# Patient Record
Sex: FEMALE | Race: ASIAN | Employment: OTHER | URBAN - METROPOLITAN AREA
[De-identification: names, ages, dates, MRNs, and addresses within clinical notes are randomized per-mention and may not be internally consistent; named-entity substitution may affect disease eponyms.]

---

## 2022-07-06 ENCOUNTER — APPOINTMENT (OUTPATIENT)
Dept: GENERAL RADIOLOGY | Age: 87
End: 2022-07-06
Attending: EMERGENCY MEDICINE
Payer: COMMERCIAL

## 2022-07-06 ENCOUNTER — APPOINTMENT (OUTPATIENT)
Dept: CT IMAGING | Age: 87
End: 2022-07-06
Attending: EMERGENCY MEDICINE
Payer: COMMERCIAL

## 2022-07-06 ENCOUNTER — HOSPITAL ENCOUNTER (EMERGENCY)
Age: 87
Discharge: HOME | End: 2022-07-06
Attending: EMERGENCY MEDICINE
Payer: COMMERCIAL

## 2022-07-06 VITALS
TEMPERATURE: 98.2 F | HEART RATE: 72 BPM | WEIGHT: 165.79 LBS | SYSTOLIC BLOOD PRESSURE: 163 MMHG | OXYGEN SATURATION: 90 % | DIASTOLIC BLOOD PRESSURE: 67 MMHG | RESPIRATION RATE: 16 BRPM

## 2022-07-06 DIAGNOSIS — S00.03XA SCALP HEMATOMA, INITIAL ENCOUNTER: ICD-10-CM

## 2022-07-06 DIAGNOSIS — S01.01XA LACERATION OF SCALP, INITIAL ENCOUNTER: Primary | ICD-10-CM

## 2022-07-06 PROCEDURE — 90471 IMMUNIZATION ADMIN: CPT

## 2022-07-06 PROCEDURE — 74011000250 HC RX REV CODE- 250: Performed by: EMERGENCY MEDICINE

## 2022-07-06 PROCEDURE — 90714 TD VACC NO PRESV 7 YRS+ IM: CPT | Performed by: EMERGENCY MEDICINE

## 2022-07-06 PROCEDURE — 70450 CT HEAD/BRAIN W/O DYE: CPT

## 2022-07-06 PROCEDURE — 72170 X-RAY EXAM OF PELVIS: CPT

## 2022-07-06 PROCEDURE — 74011250636 HC RX REV CODE- 250/636: Performed by: EMERGENCY MEDICINE

## 2022-07-06 PROCEDURE — 75810000293 HC SIMP/SUPERF WND  RPR

## 2022-07-06 PROCEDURE — 99284 EMERGENCY DEPT VISIT MOD MDM: CPT

## 2022-07-06 PROCEDURE — 72125 CT NECK SPINE W/O DYE: CPT

## 2022-07-06 RX ORDER — ACETAMINOPHEN 500 MG
1000 TABLET ORAL
Status: DISCONTINUED | OUTPATIENT
Start: 2022-07-06 | End: 2022-07-06 | Stop reason: HOSPADM

## 2022-07-06 RX ADMIN — TETANUS AND DIPHTHERIA TOXOIDS ADSORBED 0.5 ML: 2; 2 INJECTION INTRAMUSCULAR at 16:07

## 2022-07-06 RX ADMIN — Medication 5 ML: at 16:28

## 2022-07-06 NOTE — DISCHARGE INSTRUCTIONS
Thank you for allowing us to provide you with medical care today. We realize that you have many choices for your emergency care needs. We thank you for choosing [facility]. Please choose us in the future for any continued health care needs. The exam and treatment you received in the Emergency Department were for an emergent problem and are not intended as complete care. It is important that you follow up with a doctor, nurse practitioner, or physician's assistant for ongoing care. If your symptoms worsen or you do not improve as expected and you are unable to reach your usual health care provider, you should return to the Emergency Department. We are available 24 hours a day. Please make an appointment with your health care provider(s) for follow up of your Emergency Department visit. Take this sheet with you when you go to your follow-up visit.

## 2022-07-06 NOTE — ED PROVIDER NOTES
45-year-old female presents with a chief complaint of headache after a fall. Patient went to sit down at lunch and fell backwards. She did hit her head but did not lose consciousness. She is not on any blood thinners. She did notice some bleeding from the back of her head. She denies neck pain, back pain, chest pain or abdominal pain. She was initially complaining of left hip pain according to the daughter           No past medical history on file. No past surgical history on file. No family history on file. Social History     Socioeconomic History    Marital status: Not on file     Spouse name: Not on file    Number of children: Not on file    Years of education: Not on file    Highest education level: Not on file   Occupational History    Not on file   Tobacco Use    Smoking status: Not on file    Smokeless tobacco: Not on file   Substance and Sexual Activity    Alcohol use: Not on file    Drug use: Not on file    Sexual activity: Not on file   Other Topics Concern    Not on file   Social History Narrative    Not on file     Social Determinants of Health     Financial Resource Strain:     Difficulty of Paying Living Expenses: Not on file   Food Insecurity:     Worried About Running Out of Food in the Last Year: Not on file    [place] of Food in the Last Year: Not on file   Transportation Needs:     Lack of Transportation (Medical): Not on file    Lack of Transportation (Non-Medical):  Not on file   Physical Activity:     Days of Exercise per Week: Not on file    Minutes of Exercise per Session: Not on file   Stress:     Feeling of Stress : Not on file   Social Connections:     Frequency of Communication with Friends and Family: Not on file    Frequency of Social Gatherings with Friends and Family: Not on file    Attends [employer] Services: Not on file    Active Member of Clubs or Organizations: Not on file    Attends Club or Organization Meetings: Not on file    Marital Status: Not on file   Intimate Partner Violence:     Fear of Current or Ex-Partner: Not on file    Emotionally Abused: Not on file    Physically Abused: Not on file    Sexually Abused: Not on file   Housing Stability:     Unable to Pay for Housing in the Last Year: Not on file    Number of [place] in the Last Year: Not on file    Unstable Housing in the Last Year: Not on file         ALLERGIES: Patient has no known allergies. Review of Systems   Constitutional: Negative for fever. HENT: Negative for rhinorrhea. Respiratory: Negative for shortness of breath. Cardiovascular: Negative for chest pain. Gastrointestinal: Negative for abdominal pain. Genitourinary: Negative for dysuria. Musculoskeletal: Negative for back pain. Skin: Negative for wound. Neurological: Positive for headaches. Psychiatric/Behavioral: Negative for confusion. Vitals:    07/06/22 1425   BP: (!) 187/75   Pulse: 72   Resp: 20   Temp: 98.2 °F (36.8 °C)   SpO2: 91%   Weight: 75.2 kg (165 lb 12.6 oz)            Physical Exam  Vitals and nursing note reviewed. Constitutional:       General: She is not in acute distress. Appearance: Normal appearance. She is not ill-appearing, toxic-appearing or diaphoretic. HENT:      Head: Normocephalic. Comments: 1 cm posterior scalp laceration. Bleeding controlled. Eyes:      Extraocular Movements: Extraocular movements intact. Cardiovascular:      Rate and Rhythm: Normal rate. Pulses: Normal pulses. Heart sounds: Normal heart sounds. Pulmonary:      Effort: Pulmonary effort is normal. No respiratory distress. Breath sounds: Normal breath sounds. Abdominal:      General: There is no distension. Musculoskeletal:         General: Normal range of motion. Cervical back: Normal range of motion. Skin:     General: Skin is dry. Neurological:      General: No focal deficit present.       Mental Status: She is alert and oriented to person, place, and time. Psychiatric:         Mood and Affect: Mood normal.          MDM  Number of Diagnoses or Management Options  Laceration of scalp, initial encounter  Scalp hematoma, initial encounter  Diagnosis management comments:     Plain film and CT imaging obtained to rule out traumatic injury. Imaging unremarkable. Laceration repaired per procedure note. Discussed my clinical impression(s), any labs and/or radiology results with the patient. I answered any questions and addressed any concerns. Discussed the importance of following up with their primary care physician and/or specialist(s). Discussed signs or symptoms that would warrant return back to the ER for further evaluation. The patient is agreeable with discharge. Wound Repair    Date/Time: 7/6/2022 5:10 PM  Performed by: attendingLocation details: scalp  Wound length:2.5 cm or less    Anesthesia:  Local Anesthetic: LET (lido, epi, tetracaine)  Foreign bodies: no foreign bodies  Debridement: none  Skin closure: staples  Number of sutures: 3  Technique: simple  Approximation: close  Patient tolerance: patient tolerated the procedure well with no immediate complications  My total time at bedside, performing this procedure was 1-15 minutes.

## 2022-07-06 NOTE — ED TRIAGE NOTES
Pt's family reports pt was sitting back into a chair and was not aware the chair was no longer behind her. She fell onto her buttocks and struck the back of her head on a sliding door; denies any loss of consciousness. PT c/o L hip pain, posterior head laceration. Pt presents to eD awake, alert, oriented, ambulatory with assistance. Laceration to back of head noted, bleeding controlled.

## 2022-07-14 ENCOUNTER — HOSPITAL ENCOUNTER (EMERGENCY)
Age: 87
Discharge: HOME | End: 2022-07-14
Attending: STUDENT IN AN ORGANIZED HEALTH CARE EDUCATION/TRAINING PROGRAM
Payer: COMMERCIAL

## 2022-07-14 VITALS
TEMPERATURE: 98.7 F | OXYGEN SATURATION: 94 % | DIASTOLIC BLOOD PRESSURE: 48 MMHG | SYSTOLIC BLOOD PRESSURE: 138 MMHG | HEART RATE: 74 BPM | RESPIRATION RATE: 16 BRPM

## 2022-07-14 DIAGNOSIS — Z48.02 ENCOUNTER FOR STAPLE REMOVAL: Primary | ICD-10-CM

## 2022-07-14 PROCEDURE — 75810000275 HC EMERGENCY DEPT VISIT NO LEVEL OF CARE

## 2022-07-14 NOTE — ED PROVIDER NOTES
Patient is a 22-year-old female presenting to the ED for staple removal.  Patient is a fall and had 3 staples placed in the back of her head. Area is clean dry and intact. Spencer appear ready for removal    The history is provided by the patient and a relative. Staple Removal   This is a new problem. The current episode started more than 2 days ago. The problem occurs constantly. The problem has been resolved. The pain is at a severity of 0/10. The patient is experiencing no pain. Pertinent negatives include no numbness. She has tried nothing for the symptoms. The treatment provided no relief. There has been a history of trauma. Past Medical History:   Diagnosis Date    Hypertension     Seizure [facility])        History reviewed. No pertinent surgical history. History reviewed. No pertinent family history. Social History     Socioeconomic History    Marital status:      Spouse name: Not on file    Number of children: Not on file    Years of education: Not on file    Highest education level: Not on file   Occupational History    Not on file   Tobacco Use    Smoking status: Never Smoker    Smokeless tobacco: Never Used   Substance and Sexual Activity    Alcohol use: Not on file    Drug use: Not on file    Sexual activity: Not on file   Other Topics Concern    Not on file   Social History Narrative    Not on file     Social Determinants of Health     Financial Resource Strain:     Difficulty of Paying Living Expenses: Not on file   Food Insecurity:     Worried About Running Out of Food in the Last Year: Not on file    [place] of Food in the Last Year: Not on file   Transportation Needs:     Lack of Transportation (Medical): Not on file    Lack of Transportation (Non-Medical):  Not on file   Physical Activity:     Days of Exercise per Week: Not on file    Minutes of Exercise per Session: Not on file   Stress:     Feeling of Stress : Not on file   Social Connections:     Frequency of Communication with Friends and Family: Not on file    Frequency of Social Gatherings with Friends and Family: Not on file    Attends Taoist Services: Not on file    Active Member of Clubs or Organizations: Not on file    Attends Club or Organization Meetings: Not on file    Marital Status: Not on file   Intimate Partner Violence:     Fear of Current or Ex-Partner: Not on file    Emotionally Abused: Not on file    Physically Abused: Not on file    Sexually Abused: Not on file   Housing Stability:     Unable to Pay for Housing in the Last Year: Not on file    Number of [place] in the Last Year: Not on file    Unstable Housing in the Last Year: Not on file         ALLERGIES: Patient has no known allergies. Review of Systems   Constitutional: Negative. HENT: Negative. Eyes: Negative. Respiratory: Negative. Cardiovascular: Negative. Gastrointestinal: Negative. Endocrine: Negative. Genitourinary: Negative. Musculoskeletal: Negative. Skin: Positive for wound. Allergic/Immunologic: Negative. Neurological: Negative. Negative for numbness. Hematological: Negative. Psychiatric/Behavioral: Negative. Vitals:    07/14/22 1109   BP: (!) 138/48   Pulse: 74   Resp: 16   Temp: 98.7 °F (37.1 °C)   SpO2: 94%            Physical Exam  Vitals and nursing note reviewed. Constitutional:       General: She is not in acute distress. Appearance: Normal appearance. HENT:      Head: Normocephalic. Comments: 3 staples to the head     Right Ear: External ear normal.      Left Ear: External ear normal.      Nose: Nose normal.   Eyes:      Extraocular Movements: Extraocular movements intact. Conjunctiva/sclera: Conjunctivae normal.   Cardiovascular:      Rate and Rhythm: Normal rate. Pulses:           Radial pulses are 2+ on the right side and 2+ on the left side.    Pulmonary:      Effort: Pulmonary effort is normal.   Abdominal:      General: Abdomen is flat. There is no distension. Tenderness: There is no abdominal tenderness. Musculoskeletal:         General: No deformity or signs of injury. Normal range of motion. Cervical back: Normal range of motion and neck supple. No tenderness. Skin:     General: Skin is warm and dry. Neurological:      General: No focal deficit present. Mental Status: She is alert and oriented to person, place, and time. Psychiatric:         Attention and Perception: Attention normal.         Mood and Affect: Mood normal.         Behavior: Behavior normal.          MDM     MEDICATIONS GIVEN:  Medications - No data to display    Differential diagnosis: Staple removal, laceration    MDM: Patient is a 80 old female presenting the ED for removal of staples from previous laceration to scalp. Area is clean dry and intact. Stable removal as below. Patient stable for discharge home. Wound cleaned  Nursing. Remove dried blood from her hair. Key discharge instructions and summary of care: You presented to the ED for staple removal.  3 staples removed. Wound was cleaned. May wash and care for the area as usual.  Follow-up    Patient is stable for discharge. All available radiology and laboratory results have been reviewed with patient and/or available family. Patient and/or family verbally conveyed their understanding and agreement of the patient's signs, symptoms, diagnosis, treatment and prognosis and additionally agree to follow-up as recommended in the discharge instructions or to return to the Emergency Department should their condition change or worsen prior to their follow-up appointment. All questions have been answered and patient and/or available family express understanding. IMPRESSION:  1. Encounter for staple removal        DISPOSITION: Discharged    Dev Pulido MD        Suture/Staple Removal    Date/Time: 7/14/2022 11:24 AM  Performed by: Griselda Figueroa MD  Authorized by: Nataliia Mendoza MD     Consent:     Consent obtained:  Verbal    Consent given by:  Patient    Risks discussed:  Bleeding and pain    Alternatives discussed:  No treatment  Location:     Location:  Head/neck    Head/neck location:  Scalp  Procedure details:     Wound appearance:  No signs of infection    Number of staples removed:  3  Post-procedure details:     Post-procedure assessment: Wound cleaned. Patient tolerance of procedure:   Tolerated well, no immediate complications

## 2022-07-14 NOTE — DISCHARGE INSTRUCTIONS
You presented to the ED for staple removal.  3 staples removed. Wound was cleaned.   May wash and care for the area as usual.

## 2022-09-23 ENCOUNTER — HOSPITAL ENCOUNTER (INPATIENT)
Age: 87
LOS: 3 days | Discharge: HOSPICE | DRG: 189 | End: 2022-09-26
Attending: EMERGENCY MEDICINE | Admitting: FAMILY MEDICINE
Payer: COMMERCIAL

## 2022-09-23 ENCOUNTER — APPOINTMENT (OUTPATIENT)
Dept: GENERAL RADIOLOGY | Age: 87
DRG: 189 | End: 2022-09-23
Attending: FAMILY MEDICINE
Payer: COMMERCIAL

## 2022-09-23 ENCOUNTER — APPOINTMENT (OUTPATIENT)
Dept: CT IMAGING | Age: 87
DRG: 189 | End: 2022-09-23
Attending: FAMILY MEDICINE
Payer: COMMERCIAL

## 2022-09-23 ENCOUNTER — APPOINTMENT (OUTPATIENT)
Dept: CT IMAGING | Age: 87
DRG: 189 | End: 2022-09-23
Attending: EMERGENCY MEDICINE
Payer: COMMERCIAL

## 2022-09-23 ENCOUNTER — APPOINTMENT (OUTPATIENT)
Dept: GENERAL RADIOLOGY | Age: 87
DRG: 189 | End: 2022-09-23
Attending: EMERGENCY MEDICINE
Payer: COMMERCIAL

## 2022-09-23 DIAGNOSIS — G93.40 ENCEPHALOPATHY: ICD-10-CM

## 2022-09-23 DIAGNOSIS — E87.1 HYPONATREMIA: ICD-10-CM

## 2022-09-23 DIAGNOSIS — J96.01 ACUTE RESPIRATORY FAILURE WITH HYPOXEMIA (HCC): Primary | ICD-10-CM

## 2022-09-23 DIAGNOSIS — U07.1 COVID-19: ICD-10-CM

## 2022-09-23 PROBLEM — J96.90 RESPIRATORY FAILURE (HCC): Status: ACTIVE | Noted: 2022-09-23

## 2022-09-23 LAB
ALBUMIN SERPL-MCNC: 3.5 G/DL (ref 3.5–5)
ALBUMIN/GLOB SERPL: 0.9 {RATIO} (ref 1.1–2.2)
ALP SERPL-CCNC: 82 U/L (ref 45–117)
ALT SERPL-CCNC: 24 U/L (ref 12–78)
ANION GAP SERPL CALC-SCNC: 3 MMOL/L (ref 5–15)
ANION GAP SERPL CALC-SCNC: 9 MMOL/L (ref 5–15)
APPEARANCE UR: CLEAR
ARTERIAL PATENCY WRIST A: ABNORMAL
AST SERPL-CCNC: 25 U/L (ref 15–37)
ATRIAL RATE: 81 BPM
BACTERIA URNS QL MICRO: NEGATIVE /HPF
BASE EXCESS BLD CALC-SCNC: 3.3 MMOL/L
BASE EXCESS BLDV CALC-SCNC: 7.1 MMOL/L
BASOPHILS # BLD: 0 K/UL (ref 0–0.1)
BASOPHILS NFR BLD: 0 % (ref 0–1)
BDY SITE: ABNORMAL
BILIRUB SERPL-MCNC: 0.7 MG/DL (ref 0.2–1)
BILIRUB UR QL: NEGATIVE
BNP SERPL-MCNC: 3813 PG/ML (ref 0–450)
BUN SERPL-MCNC: 7 MG/DL (ref 6–20)
BUN SERPL-MCNC: 8 MG/DL (ref 6–20)
BUN/CREAT SERPL: 11 (ref 12–20)
BUN/CREAT SERPL: 8 (ref 12–20)
CALCIUM SERPL-MCNC: 7.8 MG/DL (ref 8.5–10.1)
CALCIUM SERPL-MCNC: 8.4 MG/DL (ref 8.5–10.1)
CALCULATED P AXIS, ECG09: 66 DEGREES
CALCULATED R AXIS, ECG10: 82 DEGREES
CALCULATED T AXIS, ECG11: 89 DEGREES
CHLORIDE SERPL-SCNC: 84 MMOL/L (ref 97–108)
CHLORIDE SERPL-SCNC: 85 MMOL/L (ref 97–108)
CO2 SERPL-SCNC: 30 MMOL/L (ref 21–32)
CO2 SERPL-SCNC: 33 MMOL/L (ref 21–32)
COLOR UR: ABNORMAL
COVID-19 RAPID TEST, COVR: DETECTED
CREAT SERPL-MCNC: 0.7 MG/DL (ref 0.55–1.02)
CREAT SERPL-MCNC: 0.9 MG/DL (ref 0.55–1.02)
CRP SERPL-MCNC: 1.16 MG/DL
D DIMER PPP FEU-MCNC: 1 MG/L FEU (ref 0–0.65)
DIAGNOSIS, 93000: NORMAL
DIFFERENTIAL METHOD BLD: ABNORMAL
EOSINOPHIL # BLD: 0 K/UL (ref 0–0.4)
EOSINOPHIL NFR BLD: 0 % (ref 0–7)
EPITH CASTS URNS QL MICRO: ABNORMAL /LPF
ERYTHROCYTE [DISTWIDTH] IN BLOOD BY AUTOMATED COUNT: 15.5 % (ref 11.5–14.5)
ERYTHROCYTE [SEDIMENTATION RATE] IN BLOOD: 9 MM/HR (ref 0–30)
GAS FLOW.O2 O2 DELIVERY SYS: ABNORMAL L/MIN
GLOBULIN SER CALC-MCNC: 3.8 G/DL (ref 2–4)
GLUCOSE SERPL-MCNC: 114 MG/DL (ref 65–100)
GLUCOSE SERPL-MCNC: 91 MG/DL (ref 65–100)
GLUCOSE UR STRIP.AUTO-MCNC: NEGATIVE MG/DL
HCO3 BLD-SCNC: 31.7 MMOL/L (ref 22–26)
HCO3 BLDV-SCNC: 35.2 MMOL/L (ref 23–28)
HCT VFR BLD AUTO: 40.2 % (ref 35–47)
HGB BLD-MCNC: 12.8 G/DL (ref 11.5–16)
HGB UR QL STRIP: ABNORMAL
HYALINE CASTS URNS QL MICRO: ABNORMAL /LPF (ref 0–5)
IMM GRANULOCYTES # BLD AUTO: 0 K/UL (ref 0–0.04)
IMM GRANULOCYTES NFR BLD AUTO: 0 % (ref 0–0.5)
KETONES UR QL STRIP.AUTO: NEGATIVE MG/DL
LACTATE SERPL-SCNC: 1.4 MMOL/L (ref 0.4–2)
LEUKOCYTE ESTERASE UR QL STRIP.AUTO: NEGATIVE
LYMPHOCYTES # BLD: 0.5 K/UL (ref 0.8–3.5)
LYMPHOCYTES NFR BLD: 7 % (ref 12–49)
MAGNESIUM SERPL-MCNC: 1.8 MG/DL (ref 1.6–2.4)
MCH RBC QN AUTO: 26.1 PG (ref 26–34)
MCHC RBC AUTO-ENTMCNC: 31.8 G/DL (ref 30–36.5)
MCV RBC AUTO: 81.9 FL (ref 80–99)
MONOCYTES # BLD: 1.1 K/UL (ref 0–1)
MONOCYTES NFR BLD: 15 % (ref 5–13)
NEUTS SEG # BLD: 5.4 K/UL (ref 1.8–8)
NEUTS SEG NFR BLD: 78 % (ref 32–75)
NITRITE UR QL STRIP.AUTO: NEGATIVE
NRBC # BLD: 0 K/UL (ref 0–0.01)
NRBC BLD-RTO: 0 PER 100 WBC
O2/TOTAL GAS SETTING VFR VENT: 2 %
OSMOLALITY SERPL: 258 MOSM/KG H2O
OSMOLALITY UR: 314 MOSM/KG H2O
P-R INTERVAL, ECG05: 150 MS
PCO2 BLD: 64.1 MMHG (ref 35–45)
PCO2 BLDV: 61 MMHG (ref 41–51)
PH BLD: 7.3 [PH] (ref 7.35–7.45)
PH BLDV: 7.37 [PH] (ref 7.32–7.42)
PH UR STRIP: 8.5 [PH] (ref 5–8)
PLATELET # BLD AUTO: 187 K/UL (ref 150–400)
PLATELET COMMENTS,PCOM: ABNORMAL
PMV BLD AUTO: 9.3 FL (ref 8.9–12.9)
PO2 BLD: 73 MMHG (ref 80–100)
PO2 BLDV: 31 MMHG (ref 25–40)
POTASSIUM SERPL-SCNC: 4 MMOL/L (ref 3.5–5.1)
POTASSIUM SERPL-SCNC: 4.6 MMOL/L (ref 3.5–5.1)
POTASSIUM UR-SCNC: 14 MMOL/L
PROCALCITONIN SERPL-MCNC: <0.05 NG/ML
PROCALCITONIN SERPL-MCNC: <0.05 NG/ML
PROT SERPL-MCNC: 7.3 G/DL (ref 6.4–8.2)
PROT UR STRIP-MCNC: 30 MG/DL
Q-T INTERVAL, ECG07: 342 MS
QRS DURATION, ECG06: 64 MS
QTC CALCULATION (BEZET), ECG08: 397 MS
RBC # BLD AUTO: 4.91 M/UL (ref 3.8–5.2)
RBC #/AREA URNS HPF: ABNORMAL /HPF (ref 0–5)
RBC MORPH BLD: ABNORMAL
SAO2 % BLD: 92 % (ref 92–97)
SAO2 % BLDV: 55.7 % (ref 65–88)
SERVICE CMNT-IMP: ABNORMAL
SODIUM SERPL-SCNC: 121 MMOL/L (ref 136–145)
SODIUM SERPL-SCNC: 123 MMOL/L (ref 136–145)
SODIUM UR-SCNC: 112 MMOL/L
SOURCE, COVRS: ABNORMAL
SP GR UR REFRACTOMETRY: 1.02 (ref 1–1.03)
SPECIMEN TYPE: ABNORMAL
SPECIMEN TYPE: ABNORMAL
TROPONIN-HIGH SENSITIVITY: 24 NG/L (ref 0–51)
TROPONIN-HIGH SENSITIVITY: 43 NG/L (ref 0–51)
TSH SERPL DL<=0.05 MIU/L-ACNC: 0.35 UIU/ML (ref 0.36–3.74)
UR CULT HOLD, URHOLD: NORMAL
URATE SERPL-MCNC: 2.8 MG/DL (ref 2.6–6)
UROBILINOGEN UR QL STRIP.AUTO: 0.2 EU/DL (ref 0.2–1)
VENTRICULAR RATE, ECG03: 81 BPM
WBC # BLD AUTO: 7 K/UL (ref 3.6–11)
WBC URNS QL MICRO: ABNORMAL /HPF (ref 0–4)

## 2022-09-23 PROCEDURE — 84300 ASSAY OF URINE SODIUM: CPT

## 2022-09-23 PROCEDURE — 82803 BLOOD GASES ANY COMBINATION: CPT

## 2022-09-23 PROCEDURE — 74011250636 HC RX REV CODE- 250/636: Performed by: EMERGENCY MEDICINE

## 2022-09-23 PROCEDURE — 93005 ELECTROCARDIOGRAM TRACING: CPT

## 2022-09-23 PROCEDURE — 85652 RBC SED RATE AUTOMATED: CPT

## 2022-09-23 PROCEDURE — 84484 ASSAY OF TROPONIN QUANT: CPT

## 2022-09-23 PROCEDURE — 83935 ASSAY OF URINE OSMOLALITY: CPT

## 2022-09-23 PROCEDURE — 85025 COMPLETE CBC W/AUTO DIFF WBC: CPT

## 2022-09-23 PROCEDURE — 71275 CT ANGIOGRAPHY CHEST: CPT

## 2022-09-23 PROCEDURE — 83735 ASSAY OF MAGNESIUM: CPT

## 2022-09-23 PROCEDURE — 83605 ASSAY OF LACTIC ACID: CPT

## 2022-09-23 PROCEDURE — 87635 SARS-COV-2 COVID-19 AMP PRB: CPT

## 2022-09-23 PROCEDURE — 65270000046 HC RM TELEMETRY

## 2022-09-23 PROCEDURE — 84145 PROCALCITONIN (PCT): CPT

## 2022-09-23 PROCEDURE — 87040 BLOOD CULTURE FOR BACTERIA: CPT

## 2022-09-23 PROCEDURE — 5A09457 ASSISTANCE WITH RESPIRATORY VENTILATION, 24-96 CONSECUTIVE HOURS, CONTINUOUS POSITIVE AIRWAY PRESSURE: ICD-10-PCS | Performed by: STUDENT IN AN ORGANIZED HEALTH CARE EDUCATION/TRAINING PROGRAM

## 2022-09-23 PROCEDURE — 74011000636 HC RX REV CODE- 636: Performed by: EMERGENCY MEDICINE

## 2022-09-23 PROCEDURE — 74011250636 HC RX REV CODE- 250/636: Performed by: FAMILY MEDICINE

## 2022-09-23 PROCEDURE — 84443 ASSAY THYROID STIM HORMONE: CPT

## 2022-09-23 PROCEDURE — 85379 FIBRIN DEGRADATION QUANT: CPT

## 2022-09-23 PROCEDURE — 80053 COMPREHEN METABOLIC PANEL: CPT

## 2022-09-23 PROCEDURE — 84550 ASSAY OF BLOOD/URIC ACID: CPT

## 2022-09-23 PROCEDURE — 36415 COLL VENOUS BLD VENIPUNCTURE: CPT

## 2022-09-23 PROCEDURE — 71045 X-RAY EXAM CHEST 1 VIEW: CPT

## 2022-09-23 PROCEDURE — 74011000250 HC RX REV CODE- 250: Performed by: FAMILY MEDICINE

## 2022-09-23 PROCEDURE — 86140 C-REACTIVE PROTEIN: CPT

## 2022-09-23 PROCEDURE — 36600 WITHDRAWAL OF ARTERIAL BLOOD: CPT

## 2022-09-23 PROCEDURE — 84133 ASSAY OF URINE POTASSIUM: CPT

## 2022-09-23 PROCEDURE — 74011250637 HC RX REV CODE- 250/637: Performed by: FAMILY MEDICINE

## 2022-09-23 PROCEDURE — 83880 ASSAY OF NATRIURETIC PEPTIDE: CPT

## 2022-09-23 PROCEDURE — 81001 URINALYSIS AUTO W/SCOPE: CPT

## 2022-09-23 PROCEDURE — 96374 THER/PROPH/DIAG INJ IV PUSH: CPT

## 2022-09-23 PROCEDURE — 99285 EMERGENCY DEPT VISIT HI MDM: CPT

## 2022-09-23 PROCEDURE — 83930 ASSAY OF BLOOD OSMOLALITY: CPT

## 2022-09-23 RX ORDER — DIAZEPAM 10 MG/2ML
2 INJECTION INTRAMUSCULAR AS NEEDED
Status: COMPLETED | OUTPATIENT
Start: 2022-09-23 | End: 2022-09-23

## 2022-09-23 RX ORDER — PANTOPRAZOLE SODIUM 40 MG/1
40 TABLET, DELAYED RELEASE ORAL DAILY
COMMUNITY
Start: 2022-09-27

## 2022-09-23 RX ORDER — SODIUM CHLORIDE 0.9 % (FLUSH) 0.9 %
5-40 SYRINGE (ML) INJECTION AS NEEDED
Status: DISCONTINUED | OUTPATIENT
Start: 2022-09-23 | End: 2022-09-26 | Stop reason: HOSPADM

## 2022-09-23 RX ORDER — SODIUM CHLORIDE 9 MG/ML
75 INJECTION, SOLUTION INTRAVENOUS CONTINUOUS
Status: DISCONTINUED | OUTPATIENT
Start: 2022-09-23 | End: 2022-09-23

## 2022-09-23 RX ORDER — PANTOPRAZOLE SODIUM 40 MG/1
40 TABLET, DELAYED RELEASE ORAL DAILY
Status: DISCONTINUED | OUTPATIENT
Start: 2022-09-24 | End: 2022-09-25

## 2022-09-23 RX ORDER — FLUTICASONE PROPIONATE AND SALMETEROL 250; 50 UG/1; UG/1
1 POWDER RESPIRATORY (INHALATION) EVERY 12 HOURS
COMMUNITY
End: 2022-09-27

## 2022-09-23 RX ORDER — HYDRALAZINE HYDROCHLORIDE 20 MG/ML
10 INJECTION INTRAMUSCULAR; INTRAVENOUS
Status: DISCONTINUED | OUTPATIENT
Start: 2022-09-23 | End: 2022-09-26 | Stop reason: HOSPADM

## 2022-09-23 RX ORDER — MAG HYDROX/ALUMINUM HYD/SIMETH 200-200-20
30 SUSPENSION, ORAL (FINAL DOSE FORM) ORAL
Status: DISCONTINUED | OUTPATIENT
Start: 2022-09-23 | End: 2022-09-26 | Stop reason: HOSPADM

## 2022-09-23 RX ORDER — SODIUM CHLORIDE 0.9 % (FLUSH) 0.9 %
5-40 SYRINGE (ML) INJECTION EVERY 8 HOURS
Status: DISCONTINUED | OUTPATIENT
Start: 2022-09-23 | End: 2022-09-26 | Stop reason: HOSPADM

## 2022-09-23 RX ORDER — SODIUM CHLORIDE 9 MG/ML
50 INJECTION, SOLUTION INTRAVENOUS CONTINUOUS
Status: DISCONTINUED | OUTPATIENT
Start: 2022-09-23 | End: 2022-09-25

## 2022-09-23 RX ORDER — FUROSEMIDE 10 MG/ML
40 INJECTION INTRAMUSCULAR; INTRAVENOUS ONCE
Status: DISCONTINUED | OUTPATIENT
Start: 2022-09-23 | End: 2022-09-23

## 2022-09-23 RX ORDER — LEVETIRACETAM 500 MG/1
500 TABLET ORAL 2 TIMES DAILY
Status: DISCONTINUED | OUTPATIENT
Start: 2022-09-23 | End: 2022-09-24

## 2022-09-23 RX ORDER — ACETAMINOPHEN 325 MG/1
650 TABLET ORAL
Status: DISCONTINUED | OUTPATIENT
Start: 2022-09-23 | End: 2022-09-26 | Stop reason: HOSPADM

## 2022-09-23 RX ORDER — HEPARIN SODIUM 5000 [USP'U]/ML
5000 INJECTION, SOLUTION INTRAVENOUS; SUBCUTANEOUS EVERY 8 HOURS
Status: DISCONTINUED | OUTPATIENT
Start: 2022-09-23 | End: 2022-09-26 | Stop reason: HOSPADM

## 2022-09-23 RX ORDER — LEVETIRACETAM 500 MG/1
500 TABLET ORAL 2 TIMES DAILY
COMMUNITY
Start: 2022-09-27

## 2022-09-23 RX ADMIN — AZITHROMYCIN MONOHYDRATE 500 MG: 500 INJECTION, POWDER, LYOPHILIZED, FOR SOLUTION INTRAVENOUS at 19:04

## 2022-09-23 RX ADMIN — CEFTRIAXONE SODIUM 1 G: 1 INJECTION, POWDER, FOR SOLUTION INTRAMUSCULAR; INTRAVENOUS at 19:04

## 2022-09-23 RX ADMIN — IOPAMIDOL 80 ML: 755 INJECTION, SOLUTION INTRAVENOUS at 15:50

## 2022-09-23 RX ADMIN — SODIUM CHLORIDE 500 ML: 9 INJECTION, SOLUTION INTRAVENOUS at 13:08

## 2022-09-23 RX ADMIN — HEPARIN SODIUM 5000 UNITS: 5000 INJECTION INTRAVENOUS; SUBCUTANEOUS at 19:05

## 2022-09-23 RX ADMIN — DIAZEPAM 2 MG: 5 INJECTION, SOLUTION INTRAMUSCULAR; INTRAVENOUS at 14:37

## 2022-09-23 NOTE — ED NOTES
Difficulty obtaining EKG and NIBP due to continuous movement from patient despite direction from family. Language barrier.

## 2022-09-23 NOTE — ED NOTES
Patient indicates she needs to urinate to family. Unable to get patient up to bedside commode due to high fall risk, weakness, and requiring total assistance of 3 staff members to transfer patient from wheelchair to stretcher upon arrival.    Jl [place] explained to patient and family. Family verbalizes understanding. Patient is confused. Patient tolerated placement of purewick, but is having difficulty understanding the instruction to urinate. Patient continually attempts to get out bed despite redirection from this RN and family.

## 2022-09-23 NOTE — ED NOTES
TRANSFER - OUT REPORT:    Verbal report given to Ruthie Dumont RN(name) on James Garcia  being transferred to [address] [place] [address] Room 649(unit) for routine progression of care       Report consisted of patients Situation, Background, Assessment and   Recommendations(SBAR). Information from the following report(s) ED Summary, Intake/Output, MAR, Recent Results, and Cardiac Rhythm NSR/ Sinu Tachycardia  was reviewed with the receiving nurse. Lines:   Peripheral IV (Active)   Site Assessment Clean, dry, & intact 09/23/22 1158   Phlebitis Assessment 0 09/23/22 1158   Infiltration Assessment 0 09/23/22 1158   Dressing Status Clean, dry, & intact 09/23/22 1158       Peripheral IV 09/23/22 Left (Active)   Site Assessment Clean, dry, & intact 09/23/22 1203   Phlebitis Assessment 0 09/23/22 1203   Infiltration Assessment 0 09/23/22 1203   Dressing Status Clean, dry, & intact 09/23/22 1203   Dressing Type Transparent 09/23/22 1203   Hub Color/Line Status Pink 09/23/22 1203        Opportunity for questions and clarification was provided.       Patient transported with:   Monitor  Oxygen 4lpm O2 via NC

## 2022-09-23 NOTE — H&P
[facility]  Hospitalist Group  History and Physical    Date of Service:  9/23/2022  Primary Care Provider: Heavenly Thompson MD  Source of information: The patient and Chart review    Chief Complaint: Shortness of Breath and Cough      History of Presenting Illness:   Carl Cortez is a 80 y.o. female who presents with shortness of breath. Patient is a poor story and, history was probably obtained from chart review, apparently patient was diagnosed with COVID 4 weeks ago, since last night has had increasing shortness of breath cough and confusion, she has not been eating drinking well, came to the ER and was requested to be admitted to the hospital service, currently confused not much history could be obtained from the patient           REVIEW OF SYSTEMS:  Review of systems not obtained due to patient factors. Altered mental status    Past Medical History:   Diagnosis Date    Hypertension     Seizure ([address]       History reviewed. No pertinent surgical history. Prior to Admission medications    Medication Sig Start Date End Date Taking? Authorizing Provider   levETIRAcetam (Keppra) 500 mg tablet Take 500 mg by mouth two (2) times a day. Yes Provider, Historical   multivit with iron,minerals (MULTIVITAMIN AND MINERALS PO) Take  by mouth. Yes Provider, Historical   pantoprazole (Protonix) 40 mg tablet Take 40 mg by mouth daily. Yes Provider, Historical   fluticasone propion-salmeteroL (ADVAIR/WIXELA) 250-50 mcg/dose diskus inhaler Take 1 Puff by inhalation every twelve (12) hours. Yes Provider, Historical     No Known Allergies   History reviewed. No pertinent family history. Social History:  reports that she has never smoked. She has never used smokeless tobacco.     Family and social history were personally reviewed, all pertinent and relevant details are outlined as above.   Family history cannot be obtained as patient is confused    Objective:   Visit Vitals  BP (!) 180/89   Pulse 82   Temp 99.1 °F (37.3 °C)   Resp (!) 35   Wt 75 kg (165 lb 5.5 oz)   SpO2 100%   Breastfeeding No    O2 Flow Rate (L/min): 2 l/min O2 Device: None (Room air)    PHYSICAL EXAM:   General: Opens eyes to verbal commands, confused  HEENT: PEERL, EOMI, moist mucus membranes  Neck: Supple, no JVD, no meningeal signs  Chest: Scattered coarse rhonchi bilateral bilateral lung bases  CVS: RRR, S1 S2 heard, no murmurs/rubs/gallops  Abd: Soft, non-tender, non-distended, +bowel sounds   Ext: No clubbing, no cyanosis, no edema  Neuro/Psych: Very limited exam, moves all 4 but strength could not be tested as patient not participating in exam  Cap refill: Brisk, less than 3 seconds  Pulses: 2+, symmetric in all extremities  Skin: Warm, dry, without rashes or lesions    Data Review: All diagnostic labs and studies have been reviewed. Abnormal Labs Reviewed   COVID-19 RAPID TEST - Abnormal; Notable for the following components:       Result Value    COVID-19 rapid test Detected (*)     All other components within normal limits   CBC WITH AUTOMATED DIFF - Abnormal; Notable for the following components:    RDW 15.5 (*)     NEUTROPHILS 78 (*)     LYMPHOCYTES 7 (*)     MONOCYTES 15 (*)     ABS. LYMPHOCYTES 0.5 (*)     ABS.  MONOCYTES 1.1 (*)     All other components within normal limits   METABOLIC PANEL, COMPREHENSIVE - Abnormal; Notable for the following components:    Sodium 121 (*)     Chloride 85 (*)     CO2 33 (*)     Anion gap 3 (*)     Glucose 114 (*)     BUN/Creatinine ratio 8 (*)     GFR est non-AA 58 (*)     Calcium 8.4 (*)     A-G Ratio 0.9 (*)     All other components within normal limits   NT-PRO BNP - Abnormal; Notable for the following components:    NT pro-BNP 3,813 (*)     All other components within normal limits   C REACTIVE PROTEIN, QT - Abnormal; Notable for the following components:    C-Reactive protein 1.16 (*)     All other components within normal limits   D DIMER - Abnormal; Notable for the following components:    D-dimer 1.00 (*)     All other components within normal limits   POC VENOUS BLOOD GAS - Abnormal; Notable for the following components:    pCO2, venous (POC) 61.0 (*)     HCO3, venous (POC) 35.2 (*)     sO2, venous (POC) 55.7 (*)     All other components within normal limits       All Micro Results       Procedure Component Value Units Date/Time    CULTURE, BLOOD, PAIRED [[id number]]     Order Status: Sent Specimen: Blood     COVID-19 RAPID TEST [[id number]]  (Abnormal) Collected: 09/23/22 1205    Order Status: Completed Specimen: Nasopharyngeal Updated: 09/23/22 1233     Specimen source Nasopharyngeal        COVID-19 rapid test Detected        Comment: Rapid Abbott ID Now       The specimen is POSITIVE for SARS-CoV-2, the novel coronavirus associated with COVID-19. This test has been authorized by the FDA under an Emergency Use Authorization (EUA) for use by authorized laboratories. Fact sheet for Healthcare Providers: [employer]  Fact sheet for Patients: [employer]       Methodology: Isothermal Nucleic Acid Amplification  CALLED TO AND READ BACK BY   GARY MENENDEZ AT 8858. CRN. IMAGING:   CTA CHEST W OR W WO CONT   Final Result      1. Extensive limitation secondary to motion. No large pulmonary embolus is   identified. 2.  Small left pleural effusion left lower lobe volume loss. 3.  Focal aneurysmal dilatation of the distal thoracic aorta. 4.  Borderline size asymmetric left axillary lymph nodes with possible   asymmetric soft left breast soft tissue in the small 9 mm nodule in the left   breast. Consider correlation with dedicated breast imaging. XR CHEST PORT   Final Result   Trace left pleural effusion with underlying atelectasis.               ECG/ECHO:    Results for orders placed or performed during the hospital encounter of 09/23/22   EKG, 12 LEAD, INITIAL   Result Value Ref Range    Ventricular Rate 81 BPM    Atrial Rate 81 BPM    P-R Interval 150 ms    QRS Duration 64 ms    Q-T Interval 342 ms    QTC Calculation (Bezet) 397 ms    Calculated P Axis 66 degrees    Calculated R Axis 82 degrees    Calculated T Axis 89 degrees    Diagnosis       Normal sinus rhythm  Baseline artifact  ** Poor data quality, interpretation may be adversely affected  No previous ECGs available  Confirmed by Estuardo Fairbanks MD. ([id number]) on 9/23/2022 2:57:38 PM          Assessment:   Given the patient's current clinical presentation, there is a high level of concern for decompensation if discharged from the emergency department. Complex decision making was performed, which includes reviewing the patient's available past medical records, laboratory results, and imaging studies. Acute hypoxic respiratory failure  Recent history of COVID-19  Severe hyponatremia  Acute metabolic encephalopathy  Thoracic aortic aneurysm    Plan:     Patient will be admitted on a telemetry bed, unclear etiology, check Pro-Darvin level, empirical IV antibiotics, likely related to post-COVID etiology, ABG, blood cultures, oxygen support, pulse ox monitoring, DuoNebs, further intervention per hospital course, reassess as needed  Likely hypovolemic, fluid restriction,  goal will be to correct 8 to 12 mmol/L in the next 24 hours, neurovascular checks, close monitoring and further intervention per hospital course  Likely secondary above, check UA, IV hydration, CT of the head does not show any acute pathology, neurovascular checks, if symptoms persist may consider further intervention and diagnostics ABG and reassess as needed  Close monitoring, may consider getting vascular surgery consult, monitor    DIET: ADULT DIET Regular; 4 carb choices (60 gm/meal); Low Fat/Low Chol/High Fiber/2 gm Na;  Low Sodium (2 gm)   ISOLATION PRECAUTIONS: Droplet Plus  CODE STATUS: Full Code   DVT PROPHYLAXIS: Heparin  FUNCTIONAL STATUS PRIOR TO HOSPITALIZATION: Ambulatory and capable of self-care but relies on assistive devices (rolling walker/cane). Ambulatory status/function: Ambulates with assistance:  Cane   EARLY MOBILITY ASSESSMENT: Recommend a consult to the Mobility Team  ANTICIPATED DISCHARGE: Greater than 48 hours. ANTICIPATED DISPOSITION: Home with Home Healthcare    CRITICAL CARE WAS PERFORMED FOR THIS ENCOUNTER: YES. I had a face to face encounter with the patient, reviewed and interpreted patient data including clinical events, labs, images, vital signs, I/O's, and examined patient. I have discussed the case and the plan and management of the patient's care with the consulting services, the bedside nurses and necessary ancillary providers. This patient has a high probability of imminent, clinically significant deterioration, which requires the highest level of preparedness to intervene urgently. I participated in the decision-making and personally managed or directed the management of the following life and organ supporting interventions that required my frequent assessment to treat or prevent imminent deterioration. I personally spent 48 minutes of critical care time. This is time spent at this critically ill patient's bedside actively involved in patient care as well as the coordination of care and discussions with the patient's family. This does not include any procedural time which has been billed separately. Signed By: Kim Stevenson MD     September 23, 2022         Please note that this dictation may have been completed with Dragon, the computer voice recognition software. Quite often unanticipated grammatical, syntax, homophones, and other interpretive errors are inadvertently transcribed by the computer software. Please disregard these errors. Please excuse any errors that have escaped final proofreading.

## 2022-09-23 NOTE — ED NOTES
Patient is less anxious CT is going to be re attempted. Patient to CT via stretcher. Family accompanying patient. [facility] also at bedside. Report given to ALS provider.

## 2022-09-23 NOTE — ED PROVIDER NOTES
The history is provided by the patient. Shortness of Breath  This is a recurrent problem. The average episode lasts 2 weeks. The problem occurs continuously. The current episode started more than 1 week ago. The problem has been rapidly worsening (in the last day). Associated symptoms include cough and leg swelling. Pertinent negatives include no fever, no hemoptysis and no chest pain. Precipitated by: recent covid infection. She has tried nothing for the symptoms. She has had Prior hospitalizations. Cough  This is a recurrent problem. The current episode started more than 1 week ago. The problem occurs constantly. The problem has been gradually worsening. The cough is Non-productive. There has been no fever. Associated symptoms include shortness of breath. Pertinent negatives include no chest pain. She has tried nothing for the symptoms. Past Medical History:   Diagnosis Date    Hypertension     Seizure [facility])        History reviewed. No pertinent surgical history. History reviewed. No pertinent family history. Social History     Socioeconomic History    Marital status:      Spouse name: Not on file    Number of children: Not on file    Years of education: Not on file    Highest education level: Not on file   Occupational History    Not on file   Tobacco Use    Smoking status: Never    Smokeless tobacco: Never   Substance and Sexual Activity    Alcohol use: Not on file    Drug use: Not on file    Sexual activity: Not on file   Other Topics Concern    Not on file   Social History Narrative    Not on file     Social Determinants of Health     Financial Resource Strain: Not on file   Food Insecurity: Not on file   Transportation Needs: Not on file   Physical Activity: Not on file   Stress: Not on file   Social Connections: Not on file   Intimate Partner Violence: Not on file   Housing Stability: Not on file         ALLERGIES: Patient has no known allergies.     Review of Systems   Constitutional: Negative for fever. Respiratory:  Positive for cough and shortness of breath. Negative for hemoptysis. Cardiovascular:  Positive for leg swelling. Negative for chest pain. All other systems reviewed and are negative. Vitals:    09/23/22 1245 09/23/22 1302 09/23/22 1315 09/23/22 1330   BP:  (!) 192/101     Pulse: 83 91 87 (!) 102   Resp: 29 16 28 (!) 34   Temp:       SpO2: 95% 96% 97% 94%   Weight:                Physical Exam  Vitals and nursing note reviewed. Constitutional:       General: She is in acute distress. Appearance: She is well-developed. She is ill-appearing. HENT:      Head: Normocephalic and atraumatic. Eyes:      Conjunctiva/sclera: Conjunctivae normal.   Cardiovascular:      Rate and Rhythm: Normal rate and regular rhythm. Heart sounds: Normal heart sounds. Pulmonary:      Effort: Tachypnea and accessory muscle usage present. No respiratory distress. Breath sounds: Examination of the right-middle field reveals rales. Examination of the left-middle field reveals rales. Examination of the right-lower field reveals rales. Examination of the left-lower field reveals rales. Rales present. Abdominal:      General: There is no distension. Musculoskeletal:         General: No deformity. Normal range of motion. Cervical back: Neck supple. Skin:     General: Skin is warm and dry. Neurological:      Mental Status: She is alert. Cranial Nerves: No cranial nerve deficit. Psychiatric:         Behavior: Behavior normal.        MDM       75-year-old female presents with worsening respiratory failure overnight after having a cough and shortness of breath progressively for the last 2 weeks. She was first diagnosed with COVID 4 weeks ago but still rapid antigen positive on screening here. Her mental status is declined and her oral intake has also progressively diminished. She is on 4 L of oxygen by nasal cannula.   Pending CTA chest to rule out PE but she is not cooperative with positioning and sitting still for the test. Gentle fluids administered for slow safe correction of sodium. Procedures    EKG 1155: Rate 81, normal sinus rhythm, nonspecific ST and T wave abnormality, interpretation limited by artifact. Critical Care Time: 32 minutes  I personally performed critical care time separate of billable procedures which may include ordering and interpretation of testing, ordering of medications, direct patient assessment and reassessment, discussion with consultants or family, and documentation. Perfect Serve Consult for Admission  2:25 PM    ED Room Number: SER07/07  Patient Name and age:  Leora Rangel 80 y.o.  female  Working Diagnosis:   1. Acute respiratory failure with hypoxemia (HCC)    2. COVID-19    3. Hyponatremia    4.  Encephalopathy        COVID-19 Suspicion:  yes  Sepsis present:  no  Reassessment needed: no  Code Status:  Do Not Resuscitate  Readmission: no  Isolation Requirements:  yes  Recommended Level of Care:  step down  Department:[place] [facility] - [phone]

## 2022-09-23 NOTE — ED NOTES
Patient returned from [address] with daughter and rad tech. Patient unable to have CT due to movement. MD has already been made aware. Valium order placed. Vitals obtained.

## 2022-09-23 NOTE — ED NOTES
Patient is not able to tolerate NIBP on arm despite encouragement from family. NIBP moved to RLE - patient continues to move while BP is taking.

## 2022-09-23 NOTE — PROGRESS NOTES
Pt respiratory rate 35, room air O2 82%, crackles in lungs, and BNP > 3,800. Despite letting MD know this, MD has ordered fluids at 75 ml/hr.

## 2022-09-24 ENCOUNTER — APPOINTMENT (OUTPATIENT)
Dept: CT IMAGING | Age: 87
DRG: 189 | End: 2022-09-24
Attending: FAMILY MEDICINE
Payer: COMMERCIAL

## 2022-09-24 LAB
ALBUMIN SERPL-MCNC: 2.9 G/DL (ref 3.5–5)
ALBUMIN/GLOB SERPL: 0.9 {RATIO} (ref 1.1–2.2)
ALP SERPL-CCNC: 73 U/L (ref 45–117)
ALT SERPL-CCNC: 23 U/L (ref 12–78)
ANION GAP SERPL CALC-SCNC: 3 MMOL/L (ref 5–15)
ANION GAP SERPL CALC-SCNC: 5 MMOL/L (ref 5–15)
ANION GAP SERPL CALC-SCNC: 7 MMOL/L (ref 5–15)
AST SERPL-CCNC: 26 U/L (ref 15–37)
BASOPHILS # BLD: 0 K/UL (ref 0–0.1)
BASOPHILS NFR BLD: 0 % (ref 0–1)
BILIRUB SERPL-MCNC: 0.5 MG/DL (ref 0.2–1)
BUN SERPL-MCNC: 13 MG/DL (ref 6–20)
BUN SERPL-MCNC: 18 MG/DL (ref 6–20)
BUN SERPL-MCNC: 9 MG/DL (ref 6–20)
BUN/CREAT SERPL: 12 (ref 12–20)
BUN/CREAT SERPL: 16 (ref 12–20)
BUN/CREAT SERPL: 18 (ref 12–20)
CALCIUM SERPL-MCNC: 8 MG/DL (ref 8.5–10.1)
CALCIUM SERPL-MCNC: 8.3 MG/DL (ref 8.5–10.1)
CALCIUM SERPL-MCNC: 8.4 MG/DL (ref 8.5–10.1)
CHLORIDE SERPL-SCNC: 86 MMOL/L (ref 97–108)
CHLORIDE SERPL-SCNC: 87 MMOL/L (ref 97–108)
CHLORIDE SERPL-SCNC: 88 MMOL/L (ref 97–108)
CO2 SERPL-SCNC: 30 MMOL/L (ref 21–32)
CO2 SERPL-SCNC: 32 MMOL/L (ref 21–32)
CO2 SERPL-SCNC: 34 MMOL/L (ref 21–32)
CORTIS AM PEAK SERPL-MCNC: 18.2 UG/DL (ref 4.3–22.45)
CREAT SERPL-MCNC: 0.75 MG/DL (ref 0.55–1.02)
CREAT SERPL-MCNC: 0.8 MG/DL (ref 0.55–1.02)
CREAT SERPL-MCNC: 0.98 MG/DL (ref 0.55–1.02)
DIFFERENTIAL METHOD BLD: ABNORMAL
EOSINOPHIL # BLD: 0 K/UL (ref 0–0.4)
EOSINOPHIL NFR BLD: 0 % (ref 0–7)
ERYTHROCYTE [DISTWIDTH] IN BLOOD BY AUTOMATED COUNT: 15.3 % (ref 11.5–14.5)
GLOBULIN SER CALC-MCNC: 3.1 G/DL (ref 2–4)
GLUCOSE SERPL-MCNC: 109 MG/DL (ref 65–100)
GLUCOSE SERPL-MCNC: 68 MG/DL (ref 65–100)
GLUCOSE SERPL-MCNC: 78 MG/DL (ref 65–100)
HCT VFR BLD AUTO: 38.8 % (ref 35–47)
HGB BLD-MCNC: 12.4 G/DL (ref 11.5–16)
IMM GRANULOCYTES # BLD AUTO: 0 K/UL (ref 0–0.04)
IMM GRANULOCYTES NFR BLD AUTO: 0 % (ref 0–0.5)
LYMPHOCYTES # BLD: 0.8 K/UL (ref 0.8–3.5)
LYMPHOCYTES NFR BLD: 16 % (ref 12–49)
MCH RBC QN AUTO: 26.6 PG (ref 26–34)
MCHC RBC AUTO-ENTMCNC: 32 G/DL (ref 30–36.5)
MCV RBC AUTO: 83.3 FL (ref 80–99)
MONOCYTES # BLD: 0.7 K/UL (ref 0–1)
MONOCYTES NFR BLD: 15 % (ref 5–13)
NEUTS SEG # BLD: 3.2 K/UL (ref 1.8–8)
NEUTS SEG NFR BLD: 69 % (ref 32–75)
NRBC # BLD: 0 K/UL (ref 0–0.01)
NRBC BLD-RTO: 0 PER 100 WBC
PLATELET # BLD AUTO: 157 K/UL (ref 150–400)
PMV BLD AUTO: 8.7 FL (ref 8.9–12.9)
POTASSIUM SERPL-SCNC: 3.9 MMOL/L (ref 3.5–5.1)
POTASSIUM SERPL-SCNC: 4.1 MMOL/L (ref 3.5–5.1)
POTASSIUM SERPL-SCNC: 4.1 MMOL/L (ref 3.5–5.1)
PROT SERPL-MCNC: 6 G/DL (ref 6.4–8.2)
RBC # BLD AUTO: 4.66 M/UL (ref 3.8–5.2)
RBC MORPH BLD: ABNORMAL
SODIUM SERPL-SCNC: 123 MMOL/L (ref 136–145)
SODIUM SERPL-SCNC: 124 MMOL/L (ref 136–145)
SODIUM SERPL-SCNC: 125 MMOL/L (ref 136–145)
TROPONIN-HIGH SENSITIVITY: 42 NG/L (ref 0–51)
WBC # BLD AUTO: 4.7 K/UL (ref 3.6–11)

## 2022-09-24 PROCEDURE — 74011250636 HC RX REV CODE- 250/636: Performed by: FAMILY MEDICINE

## 2022-09-24 PROCEDURE — 85025 COMPLETE CBC W/AUTO DIFF WBC: CPT

## 2022-09-24 PROCEDURE — 65270000046 HC RM TELEMETRY

## 2022-09-24 PROCEDURE — 74011250636 HC RX REV CODE- 250/636: Performed by: STUDENT IN AN ORGANIZED HEALTH CARE EDUCATION/TRAINING PROGRAM

## 2022-09-24 PROCEDURE — 97530 THERAPEUTIC ACTIVITIES: CPT

## 2022-09-24 PROCEDURE — 80048 BASIC METABOLIC PNL TOTAL CA: CPT

## 2022-09-24 PROCEDURE — 74011250637 HC RX REV CODE- 250/637: Performed by: STUDENT IN AN ORGANIZED HEALTH CARE EDUCATION/TRAINING PROGRAM

## 2022-09-24 PROCEDURE — 74176 CT ABD & PELVIS W/O CONTRAST: CPT

## 2022-09-24 PROCEDURE — 36415 COLL VENOUS BLD VENIPUNCTURE: CPT

## 2022-09-24 PROCEDURE — 92610 EVALUATE SWALLOWING FUNCTION: CPT

## 2022-09-24 PROCEDURE — 74011000250 HC RX REV CODE- 250: Performed by: FAMILY MEDICINE

## 2022-09-24 PROCEDURE — 77010033678 HC OXYGEN DAILY

## 2022-09-24 PROCEDURE — 97161 PT EVAL LOW COMPLEX 20 MIN: CPT

## 2022-09-24 PROCEDURE — 82533 TOTAL CORTISOL: CPT

## 2022-09-24 PROCEDURE — 80053 COMPREHEN METABOLIC PANEL: CPT

## 2022-09-24 PROCEDURE — 84484 ASSAY OF TROPONIN QUANT: CPT

## 2022-09-24 PROCEDURE — 94760 N-INVAS EAR/PLS OXIMETRY 1: CPT

## 2022-09-24 RX ORDER — LEVETIRACETAM 500 MG/1
500 TABLET ORAL 2 TIMES DAILY
Status: DISCONTINUED | OUTPATIENT
Start: 2022-09-24 | End: 2022-09-25 | Stop reason: SDUPTHER

## 2022-09-24 RX ORDER — GUAIFENESIN 600 MG/1
600 TABLET, EXTENDED RELEASE ORAL EVERY 12 HOURS
Status: DISCONTINUED | OUTPATIENT
Start: 2022-09-24 | End: 2022-09-26 | Stop reason: HOSPADM

## 2022-09-24 RX ORDER — BENZONATATE 100 MG/1
100 CAPSULE ORAL
Status: DISCONTINUED | OUTPATIENT
Start: 2022-09-24 | End: 2022-09-26 | Stop reason: HOSPADM

## 2022-09-24 RX ORDER — LEVETIRACETAM 500 MG/5ML
500 INJECTION, SOLUTION, CONCENTRATE INTRAVENOUS EVERY 12 HOURS
Status: DISCONTINUED | OUTPATIENT
Start: 2022-09-24 | End: 2022-09-24

## 2022-09-24 RX ORDER — GUAIFENESIN/DEXTROMETHORPHAN 100-10MG/5
10 SYRUP ORAL
Status: DISCONTINUED | OUTPATIENT
Start: 2022-09-24 | End: 2022-09-26 | Stop reason: HOSPADM

## 2022-09-24 RX ORDER — ALBUTEROL SULFATE 90 UG/1
2 AEROSOL, METERED RESPIRATORY (INHALATION)
Status: DISCONTINUED | OUTPATIENT
Start: 2022-09-24 | End: 2022-09-24

## 2022-09-24 RX ORDER — ALBUTEROL SULFATE 90 UG/1
2 AEROSOL, METERED RESPIRATORY (INHALATION)
Status: DISCONTINUED | OUTPATIENT
Start: 2022-09-24 | End: 2022-09-26 | Stop reason: HOSPADM

## 2022-09-24 RX ORDER — PANTOPRAZOLE SODIUM 40 MG/1
40 TABLET, DELAYED RELEASE ORAL
Status: DISCONTINUED | OUTPATIENT
Start: 2022-09-24 | End: 2022-09-26 | Stop reason: HOSPADM

## 2022-09-24 RX ADMIN — AZITHROMYCIN MONOHYDRATE 500 MG: 500 INJECTION, POWDER, LYOPHILIZED, FOR SOLUTION INTRAVENOUS at 18:54

## 2022-09-24 RX ADMIN — LEVETIRACETAM 500 MG: 100 INJECTION INTRAVENOUS at 13:33

## 2022-09-24 RX ADMIN — PANTOPRAZOLE SODIUM 40 MG: 40 TABLET, DELAYED RELEASE ORAL at 18:50

## 2022-09-24 RX ADMIN — SODIUM CHLORIDE, PRESERVATIVE FREE 10 ML: 5 INJECTION INTRAVENOUS at 13:34

## 2022-09-24 RX ADMIN — GUAIFENESIN 600 MG: 600 TABLET, EXTENDED RELEASE ORAL at 21:06

## 2022-09-24 RX ADMIN — LEVETIRACETAM 500 MG: 500 TABLET, FILM COATED ORAL at 18:50

## 2022-09-24 RX ADMIN — CEFTRIAXONE SODIUM 1 G: 1 INJECTION, POWDER, FOR SOLUTION INTRAMUSCULAR; INTRAVENOUS at 18:54

## 2022-09-24 RX ADMIN — HEPARIN SODIUM 5000 UNITS: 5000 INJECTION INTRAVENOUS; SUBCUTANEOUS at 02:32

## 2022-09-24 RX ADMIN — HEPARIN SODIUM 5000 UNITS: 5000 INJECTION INTRAVENOUS; SUBCUTANEOUS at 18:51

## 2022-09-24 RX ADMIN — SODIUM CHLORIDE 10 ML: 9 INJECTION, SOLUTION INTRAMUSCULAR; INTRAVENOUS; SUBCUTANEOUS at 21:03

## 2022-09-24 NOTE — PROGRESS NOTES
SLP Contact Note    Update: Discussed with Dr. Krissy Milligan and order placed under verbal with readback. Will see today. Consult received as a per protocol order from RN. Therapy is not a per protocol service and therefore cannot see per protocol orders. Upon looking into patient's chart, no swallow screen has been done. No signs of aspiration on chest CT. Furthermore, pt is of advanced age (80). Given that aspiration/penetration is considered a normal swallow variance above the age of 80 (presbyphagia) per research, would not be inclined to modify a patient with advanced age's diet unless there are signs of clinical intolerance (signs of infection, worsening chest imaging etc) or if diet texture needs to be adjusted for dentition/cognition. Therefore, would recommend completing swallow screen when pt is appropriate. If patient with overt difficulty, can certainly place SLP consult, but please ensure it is either telephone or verbal with readback from attending.        Thank you,  Fredrick Gomez M.Ed, Shamar Olivas  Speech-Language Pathologist

## 2022-09-24 NOTE — PROGRESS NOTES
Problem: Dysphagia (Adult)  Goal: *Acute Goals and Plan of Care (Insert Text)  Description: Speech Therapy Goals  Initiated 9/24/22    1. Patient will tolerate baseline diet without adverse effects within 7 days. Outcome: Progressing Towards Goal       SPEECH LANGUAGE PATHOLOGY BEDSIDE SWALLOW EVALUATION  Patient: Daiana Andres (87 y.o. female)  Date: 9/24/2022  Primary Diagnosis: Respiratory failure ([address] [J96.90]       Precautions:        ASSESSMENT :  SLP evaluation complete. Pt does have a delayed cough intermittent after belching consistent with reflux. Pt difficulty with medications could also be related to presbyesophagus. Reflux could also be a reason for pt's chronic bronchitis. Therefore, general esophageal/reflux precautions indicated. Recommend regular diet/thin liquids. Would not thicken liquids. There are no signs of clinical intolerance of PO (no changes in WBC or signs of aspiration on chest imaging). Given that aspiration/penetration is considered a normal swallow variance above the age of 80 (presbyphagia) per research, would not be inclined to modify a patient with advanced age's diet unless there are signs of clinical intolerance (signs of infection, worsening chest imaging etc) or if diet texture needs to be adjusted for dentition/cognition. Therefore, would recommend monitoring pt's overall clinical tolerance and not necessarily taking pt's bedside presentation as indicative of diet tolerance. Patient will benefit from skilled intervention to address the above impairments. Patients rehabilitation potential is considered to be Guarded     PLAN :  Recommendations and Planned Interventions:  --regular diet/thin liquids  --patient prefers cup sips not straw.   --consider medical management of reflux  --meds in applesauce crushed when possible  --good oral care  --do not thicken liquids without discussing with SLP first    Frequency/Duration: Patient will be followed by speech-language pathology 3 times a week to address goals. Discharge Recommendations: None     SUBJECTIVE:   Patient stated, Abelino Medrano. Pt pleasant and cooperative. Family at bedside. Significant education done with family re: presbyphagia and presbyesophagus. OBJECTIVE:     Past Medical History:   Diagnosis Date    Hypertension     Seizure ([address]    History reviewed. No pertinent surgical history. Prior Level of Function/Home Situation:   Home Situation  Home Environment: Private residence  # Steps to Enter: 4  One/Two Story Residence: One story  Living Alone: No  Support Systems: Child(johnathan), Other Family Member(s)  Patient Expects to be Discharged to[de-identified] Home  Diet prior to admission: regular diet/thin liquids  Current Diet:  NPO   Cognitive and Communication Status:  Neurologic State: Alert, Confused  Orientation Level: Oriented to person  Cognition: Follows commands, Poor safety awareness           Oral Assessment:  Oral Assessment  Labial: No impairment  Oral Hygiene: oral mucosa slightly dry  Lingual: No impairment  Velum: No impairment  Mandible: No impairment  P.O. Trials:  Patient Position: upright in bed  Vocal quality prior to P.O.: No impairment  Consistency Presented: Thin liquid; Solid  How Presented: Self-fed/presented;Cup/sip;Straw;Successive swallows     Bolus Acceptance: No impairment  Bolus Formation/Control: No impairment     Propulsion: No impairment  Oral Residue: None  Initiation of Swallow: No impairment  Laryngeal Elevation: Functional  Aspiration Signs/Symptoms:  (cough; seems to be associated with belching)  Pharyngeal Phase Characteristics: No impairment, issues, or problems              Oral Phase Severity: No impairment  Pharyngeal Phase Severity :  (likely presbyphagia and presbyesophagus)    NOMS:   The NOMS functional outcome measure was used to quantify this patient's level of swallowing impairment.   Based on the NOMS, the patient was determined to be at level 6 for swallow function       NOMS Swallowing Levels:  Level 1 (CN): NPO  Level 2 (CM): NPO but takes consistency in therapy  Level 3 (CL): Takes less than 50% of nutrition p.o. and continues with nonoral feedings; and/or safe with mod cues; and/or max diet restriction  Level 4 (CK): Safe swallow but needs mod cues; and/or mod diet restriction; and/or still requires some nonoral feeding/supplements  Level 5 (CJ): Safe swallow with min diet restriction; and/or needs min cues  Level 6 (CI): Independent with p.o.; rare cues; usually self cues; may need to avoid some foods or needs extra time  Level 7 ([address] [place] [address]): Independent for all p.o.  JENN. (2003). National Outcomes Measurement System (NOMS): Adult Speech-Language Pathology User's Guide. Pain:  Pain Scale 1: Numeric (0 - 10)  Pain Intensity 1: 0       After treatment:   Call bell within reach and Nursing notified    COMMUNICATION/EDUCATION:     The patient's plan of care including recommendations, planned interventions, and recommended diet changes were discussed with: Registered nurse, MD.     Patient/family have participated as able in goal setting and plan of care.     Thank you for this referral.  Josefa Longo, SLP  Time Calculation: 15 mins

## 2022-09-24 NOTE — CONSULTS
PULMONARY MEDICINE    Initial Physician Consultation Note    Name: Betsy Wood   : 1928   MRN: [id number]   Date: 2022      Subjective:   Consult Note: 2022   Requesting Physician: jose  Reason for consult: Dr. Radha Craig records and data reviewed. Patient is a 80 y.o. female who presented to the hospital with shortness of breath. Patient initially tested positive for COVID-19 4 weeks ago when everyone in the family had infection when school started. She had mild symptoms then. Since then she has had failure to thrive with poor oral intake, shortness of breath and limited endurance. Her family also reports cough productive of clear expectoration. 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 She was also noted to be hypoxic requiring 2 L of nasal cannula oxygen. She underwent CT angiogram of the chest that did not show pulmonary emboli, but did show findings of small left-sided pleural effusion with basilar atelectatic changes as well as cardiomegaly. CT scan was limited by motion artifact. She has undergone CT of the abdomen and lower lung cuts will be reviewed. ABG had shown presence of partially compensated respiratory acidosis. She has never smoked, is visiting the [address] [place] [address],[address] from [facility], has not had COVID-19 infection prior to this current episode. She has had a history of recurrent bronchitis and has a prescription for Serevent that she is using for shortness of breath. She was to see Dr. Stacy Banrey in the office next week    Review of Systems:     A comprehensive 12 system review of systems was mated from the patient.   Family contributed to history    Assessment:   Acute hypoxic hypercarbic pulmonary insufficiency  COVID-19 infection, initial positive test was 4 weeks ago, she tested positive with a rapid test on presentation to the hospital, possibilities include delayed clearance of initial viral infection or reinfection, CRP is 1.16 and procalcitonin is normal  Abnormal CT scan of the chest with small left effusion and findings of left basilar linear atelectasis and patchy airspace disease, evaluation of pulmonary parenchyma limited by motion artifact  Severe hyponatremia with dehydration and SIADH  Previous history of unspecified pulmonary problems, treated with empiric bronchodilators  History of seizure disorder  DNR status      Recommendations: On oxygen, wean as tolerated  On empiric antibiotics  On heparin  Bronchodilators as needed with spacer device  Symptomatic management for cough  Repeat ABG to reassess respiratory acidosis  No acute therapies for COVID-19 are currently indicated  Monitor oxygen requirement and clinical course  Nephrology is following  Follow-up chest imaging is recommended in 8 to 12 weeks to reassess pulmonary parenchyma  Consider echocardiogram  Family has been asked to reschedule appointment in the pulmonary clinic to later date  Discussed extensively with patient's family  Discussed with hospitalist  Available to address acute pulmonary issues through the weekend and will otherwise check back on Monday    Active Problem List:     Problem List  Never Reviewed            Codes Class    Respiratory failure ([facility] [address] ICD-10-CM: J96.90  ICD-9-CM: 518.81          Past Medical History:      has a past medical history of Hypertension and Seizure ([facility] [address] Past Surgical History:      has no past surgical history on file. Home Medications:     Prior to Admission medications    Medication Sig Start Date End Date Taking? Authorizing Provider   levETIRAcetam (Keppra) 500 mg tablet Take 500 mg by mouth two (2) times a day. Yes Provider, Historical   multivit with iron,minerals (MULTIVITAMIN AND MINERALS PO) Take  by mouth. Yes Provider, Historical   pantoprazole (Protonix) 40 mg tablet Take 40 mg by mouth daily. Yes Provider, Historical   fluticasone propion-salmeteroL (ADVAIR/WIXELA) 250-50 mcg/dose diskus inhaler Take 1 Puff by inhalation every twelve (12) hours. Yes Provider, Historical       Allergies/Social/Family History:     No Known Allergies   Social History     Tobacco Use    Smoking status: Never    Smokeless tobacco: Never   Substance Use Topics    Alcohol use: Not on file      History reviewed. No pertinent family history. Objective:   Vital Signs:  Visit Vitals  BP (!) 126/52 (BP 1 Location: Right lower arm, BP Patient Position: At rest)   Pulse 66   Temp 98.1 °F (36.7 °C)   Resp 19   Wt 75 kg (165 lb 5.5 oz)   SpO2 100%   Breastfeeding No    O2 Flow Rate (L/min): 2 l/min O2 Device: Nasal cannula Temp (24hrs), Av.6 °F (37 °C), Min:98.1 °F (36.7 °C), Max:99.1 °F (37.3 °C)           Intake/Output:     Intake/Output Summary (Last 24 hours) at 2022 1156  Last data filed at 2022 0436  Gross per 24 hour   Intake 500 ml   Output 400 ml   Net 100 ml         Pertinent physical exam performed with PPE and COVID 19 distancing precautions as indicated  Physical exam findings of attending physician rounding on patient today reviewed as documented    Physical Exam:   General:  Sleepy, no distress, appears stated age. Tired appearing   Head:  Normocephalic   Lungs:   Symmetrical expansion   Chest wall:  No deformity. Heart:  Regular rate and rhythm   Abdomen:   Non-distended   Extremities: Atraumatic, no cyanosis or edema.    Skin: No rashes or lesions   Neurologic: Grossly nonfocal        LABS AND  DATA: Personally reviewed  Recent Labs     22  0219 22  1205   WBC 4.7 7.0   HGB 12.4 12.8   HCT 38.8 40.2    187       Recent Labs     22  0943 22  0219 22  1205 22  1204   * 123*   < >  --    K 4.1 4.1   < >  --    CL 87* 86*   < >  --    CO2 32 30   < >  --    BUN 13 9   < >  --    CREA 0.80 0.75   < >  --    GLU 68 78   < >  --    CA 8.4* 8.0*   < > --    MG  --   --   --  1.8    < > = values in this interval not displayed. Recent Labs     09/24/22  0219 09/23/22  1205   AP 73 82   TP 6.0* 7.3   ALB 2.9* 3.5   GLOB 3.1 3.8       No results for input(s): INR, PTP, APTT, INREXT, INREXT in the last 72 hours. Recent Labs     09/23/22  2133   PHI 7.30*   PCO2I 64.1*   PO2I 73*   FIO2I 2       No results for input(s): CPK, CKMB, TROIQ, BNPP in the last 72 hours. MEDS: Reviewed  Current Facility-Administered Medications   Medication Dose Route Frequency    levETIRAcetam (KEPPRA) injection 500 mg  500 mg IntraVENous Q12H    pantoprazole (PROTONIX) tablet 40 mg  40 mg Oral DAILY    sodium chloride (NS) flush 5-40 mL  5-40 mL IntraVENous Q8H    sodium chloride (NS) flush 5-40 mL  5-40 mL IntraVENous PRN    cefTRIAXone (ROCEPHIN) 1 g in 0.9% sodium chloride 10 mL IV syringe  1 g IntraVENous Q24H    azithromycin (ZITHROMAX) 500 mg in 0.9% sodium chloride 250 mL (Ayxl5Icn)  500 mg IntraVENous Q24H    acetaminophen (TYLENOL) tablet 650 mg  650 mg Oral Q4H PRN    heparin (porcine) injection 5,000 Units  5,000 Units SubCUTAneous Q8H    hydrALAZINE (APRESOLINE) 20 mg/mL injection 10 mg  10 mg IntraVENous Q6H PRN    [Held by provider] 0.9% sodium chloride infusion  50 mL/hr IntraVENous CONTINUOUS    alum-mag hydroxide-simeth (MYLANTA) oral suspension 30 mL  30 mL Oral Q4H PRN       Chest Imaging: personally reviewed and report checked  Results from Hospital Encounter encounter on 09/23/22    XR CHEST PORT    Narrative  EXAM: XR CHEST PORT    INDICATION: sob    COMPARISON: 1211 hours, earlier CT arteriogram of the chest    FINDINGS: A portable AP radiograph of the chest was obtained at 1858 hours. Pulmonary venous congestion is again demonstrated with a borderline appearance  of interstitial pulmonary edema. Small to moderate left pleural effusion is  again noted as is asymmetric patchy left basilar pulmonary densities. There is  no pneumothorax.  Visualized cardiac and mediastinal contours are stable. The  bones are severely osteopenic. Impression  Allowing for differences in patient positioning and projectional  differences, overall stable imaging findings. Results from [facility] encounter on 09/23/22    CTA CHEST W OR W WO CONT    Narrative  EXAM:  CTA CHEST W OR W WO CONT    INDICATION: Elevated d-dimer and shortness of breath with hypoxia    COMPARISON: No comparisons    TECHNIQUE: Helical thin section chest CT following uneventful intravenous  administration of nonionic contrast 100 mL of isovue 370 according to  departmental PE protocol. Coronal and sagittal reformats were performed. 3D post  processing was performed. CT dose reduction was achieved through the use of a  standardized protocol tailored for this examination and automatic exposure  control for dose modulation. FINDINGS: This is a significantly limited quality study for the evaluation of  pulmonary embolism to the proximal segmental arterial level. Evaluation is  limited by motion. No large pulmonary embolus is identified. Borderline size left axillary lymph node. Nodular density left breast measuring  9 mm with ill-defined breast tissue which appears to be asymmetric although the  right breast was incompletely imaged. THYROID: No nodule. MEDIASTINUM: No mass or lymphadenopathy. PHYLICIA: No mass or lymphadenopathy. 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 TRACHEA/BRONCHI: Patent. PLEURA: Small left pleural effusion  LUNGS: Left lower lobe volume loss. Evaluation of the lungs limited by motion. UPPER ABDOMEN: Partially imaged. No acute pathology. BONES: No aggressive bone lesion or fracture. Impression  1. Extensive limitation secondary to motion. No large pulmonary embolus is  identified. 2.  Small left pleural effusion left lower lobe volume loss.     3.  Focal aneurysmal dilatation of the distal thoracic aorta. 4.  Borderline size asymmetric left axillary lymph nodes with possible  asymmetric soft left breast soft tissue in the small 9 mm nodule in the left  breast. Consider correlation with dedicated breast imaging. Tele- reviewed    Medical decision making:   I have reviewed the flowsheet and previous day's notes  Patient has acute or chronic illness that poses a threat to life or bodily function  Review and order of Clinical lab tests  Review and Order of Radiology tests  Independent visualization of Image  Reviewed Oxygen  High Risk Drug therapy requiring intensive monitoring for toxicity: eg steroids, pressors, antibiotics        Thank you for allowing me to participate in this patient's care.     Maricarmen Madsen MD      [facility] [address] [place] [address]

## 2022-09-24 NOTE — PROGRESS NOTES
Problem: Mobility Impaired (Adult and Pediatric)  Goal: *Acute Goals and Plan of Care (Insert Text)  Description: FUNCTIONAL STATUS PRIOR TO ADMISSION: Patient was modified independent using a walking stick for functional mobility. HOME SUPPORT PRIOR TO ADMISSION: The patient lived with family but did not require assist.    Physical Therapy Goals  Initiated 9/24/2022  1. Patient will move from supine to sit and sit to supine  and scoot up and down in bed with supervision/set-up within 7 day(s). 2.  Patient will transfer from bed to chair and chair to bed with supervision/set-up using the least restrictive device within 7 day(s). 3.  Patient will perform sit to stand with supervision/set-up within 7 day(s). 4.  Patient will ambulate with supervision/set-up for 150 feet with the least restrictive device within 7 day(s). 5.  Patient will ascend/descend 4 stairs with  handrail(s) with supervision/set-up within 7 day(s). Outcome: Progressing Towards Goal    PHYSICAL THERAPY EVALUATION  Patient: Ammy Dennis (66 y.o. female)  Date: 9/24/2022  Primary Diagnosis: Respiratory failure ([facility] [address] [J96.90]       Precautions: Fall         ASSESSMENT  Based on the objective data described below, the patient presents with decreased endurance and strength, limiting functional mobility tolerance. Pt requiring Ronni x 2 for transfers and short distance amb in room, motivated to mobilize. Pt's son present in room to assist. Pt currently below functional baseline; typically Michelle with use of walking stick. May benefit from trial of RW next session given need for HHA x 2 today. Recommend discharge home with HHPT and continued s/a from family pending continued progress with acute PT. Will continue to follow to progress towards safe discharge. Current Level of Function Impacting Discharge (mobility/balance): Ronni x 2    Functional Outcome Measure:   The patient scored Total: 40/100 on the Barthel Index outcome measure which is indicative of being partially dependent in basic self-care. Other factors to consider for discharge: PLOF, level of support available upon discharge     Patient will benefit from skilled therapy intervention to address the above noted impairments. PLAN :  Recommendations and Planned Interventions: bed mobility training, transfer training, gait training, therapeutic exercises, patient and family training/education, and therapeutic activities      Frequency/Duration: Patient will be followed by physical therapy:  5 times a week to address goals. Recommendation for discharge: (in order for the patient to meet his/her long term goals)  Physical therapy at least 2 days/week in the home pending progress    This discharge recommendation:  Has not yet been discussed the attending provider and/or case management    IF patient discharges home will need the following DME: to be determined (TBD) - anticipate need for RW         SUBJECTIVE:   Pt agreeable to participate with PT, son present to assist     OBJECTIVE DATA SUMMARY:   HISTORY:    Past Medical History:   Diagnosis Date    Hypertension     Seizure ([facility] [address]    History reviewed. No pertinent surgical history. Personal factors and/or comorbidities impacting plan of care:    Home Situation  Home Environment: Private residence  # Steps to Enter: 4  One/Two Story Residence: One story  Living Alone: No  Support Systems: Child(johnathan), Other Family Member(s)  Patient Expects to be Discharged to[de-identified] Home  - per son, pt has 24/7 s/a upon discharge  - uses walking stick for mobility     EXAMINATION/PRESENTATION/DECISION MAKING:   Critical Behavior:  Neurologic State: Alert, Confused  Orientation Level: Oriented to person  Cognition: Follows commands, Poor safety awareness     Hearing:   Auditory  Auditory Impairment: per son, pt [place]    Skin:  Visible skin intact     Range Of Motion:  AROM: Within functional limits           PROM: Within functional limits Strength:    Strength: Within functional limits                                             Functional Mobility:  Bed Mobility:     Supine to Sit: Minimum assistance  Sit to Supine: Minimum assistance  - HOBe to assist, Ronni for trunk management via HHA     Transfers:  Sit to Stand: Minimum assistance  Stand to Sit: Minimum assistance         - From EOB x 1 with HHA x 2                Balance:   Sitting: Intact; Without support  Standing: Impaired; With support  Standing - Static: Constant support;Good  Standing - Dynamic : Fair;Constant support  - Pt requires supervision to maintain sitting balance EOB  - Standing balance with HHA x 2    Ambulation/Gait Training:  Distance (ft): 15 Feet (ft)     Ambulation - Level of Assistance: Minimal assistance;Assist x2     Gait Description (WDL): Exceptions to [facility]           Base of Support: Widened  Stance: Left decreased;Right decreased  Speed/Moira: Slow  Step Length: Right shortened;Left shortened      - Limited to amb in room 2/2 COVID +, demos decreased moira, decreased foot clearance B/L, HHA x 2. Functional Measure:  Barthel Index:    Bathin  Bladder: 5  Bowels: 10  Groomin  Dressin  Feedin  Mobility: 0  Stairs: 0  Toilet Use: 5  Transfer (Bed to Chair and Back): 5  Total: 40/100       The Barthel ADL Index: Guidelines  1. The index should be used as a record of what a patient does, not as a record of what a patient could do. 2. The main aim is to establish degree of independence from any help, physical or verbal, however minor and for whatever reason. 3. The need for supervision renders the patient not independent. 4. A patient's performance should be established using the best available evidence. Asking the patient, friends/relatives and nurses are the usual sources, but direct observation and common sense are also important. However direct testing is not needed.   5. Usually the patient's performance over the preceding 24-48 hours is important, but occasionally longer periods will be relevant. 6. Middle categories imply that the patient supplies over 50 per cent of the effort. 7. Use of aids to be independent is allowed. Score Interpretation (from [facility])    Independent   60-79 Minimally independent   40-59 Partially dependent   20-39 Very dependent   <20 Totally dependent     -Kimani Canas., Barthel, D.W. (1965). Functional evaluation: the Barthel Index. [address] ([address] (1997). The Barthel activities of daily living index: self-reporting versus actual performance in the old (> or = 75 years). Journal of [address] [place] [address] 45(7), [facility], JPRUDENCEF, Darryl Camarillo., Marielena Ro. (1999). Measuring the change in disability after inpatient rehabilitation; comparison of the responsiveness of the Barthel Index and Functional Pimento Measure. Journal of Neurology, Neurosurgery, and Psychiatry, 66(4), 924-275. Faraz Cummings, N.[place].A, LUIZA Stewart, & Radha Guerra M.A. (2004) Assessment of post-stroke quality of life in cost-effectiveness studies: The usefulness of the Barthel Index and the EuroQoL-5D.  Quality of Life Research, 15, 291-87        Physical Therapy Evaluation Charge Determination   History Examination Presentation Decision-Making   LOW Complexity : Zero comorbidities / personal factors that will impact the outcome / POC LOW Complexity : 1-2 Standardized tests and measures addressing body structure, function, activity limitation and / or participation in recreation  LOW Complexity : Stable, uncomplicated  LOW Complexity : FOTO score of       Based on the above components, the patient evaluation is determined to be of the following complexity level: LOW     Pain Rating:  No reports of pain     Activity Tolerance:   Fair    After treatment patient left in no apparent distress:   Supine in bed, Call bell within reach, Caregiver / family present, and Side rails x 3    COMMUNICATION/EDUCATION:   The patients plan of care was discussed with: Registered nurse. Fall prevention education was provided and the patient/caregiver indicated understanding., Patient/family have participated as able in goal setting and plan of care. , and Patient/family agree to work toward stated goals and plan of care.     Thank you for this referral.  Isa Ba, PT   Time Calculation: 25 mins

## 2022-09-24 NOTE — PROGRESS NOTES
Spoke with family members at the bedside. Updated them on patient's condition and plan of care. Questions answered. Patient asking for water and food stating she is having epigastric pain. RN to do swallow test prior to oral intake. ? Peptic ulcer   - Pain relieved within 10 minutes of eating or drinking   - Continue protonix   - Add Mylanta   - Consider GI consult in the AM     Please call family with update if they are not with the patient.

## 2022-09-24 NOTE — CONSULTS
PULMONARY MEDICINE    Initial Physician Consultation Note    Name: Puneet Wilson   : 1928   MRN: [id number]   Date: 2022      Subjective:   Consult Note: 2022   Requesting Physician: jose  Reason for consult: Dr. Jose Garcia records and data reviewed. Patient is a 80 y.o. female who presented to the hospital with shortness of breath. Patient initially tested positive for COVID-19 4 weeks ago when everyone in the family had infection when school started. She had mild symptoms then. Since then she has had failure to thrive with poor oral intake, shortness of breath and limited endurance. Her family also reports cough productive of clear expectoration. 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 She was also noted to be hypoxic requiring 2 L of nasal cannula oxygen. She underwent CT angiogram of the chest that did not show pulmonary emboli, but did show findings of small left-sided pleural effusion with basilar atelectatic changes as well as cardiomegaly. CT scan was limited by motion artifact. She has undergone CT of the abdomen and lower lung cuts will be reviewed. ABG had shown presence of partially compensated respiratory acidosis. She has never smoked, is visiting the [address] [place] [address],[address] from [facility], has not had COVID-19 infection prior to this current episode. She has had a history of recurrent bronchitis and has a prescription for Serevent that she is using for shortness of breath. She was to see Dr. Zoraida Esparza in the office next week    Review of Systems:     A comprehensive 12 system review of systems was mated from the patient.   Family contributed to history    Assessment:   Acute hypoxic hypercarbic pulmonary insufficiency  COVID-19 infection, initial positive test was 4 weeks ago, she tested positive with a rapid test on presentation to the hospital, possibilities include delayed clearance of initial viral infection or reinfection, CRP is 1.16 and procalcitonin is normal  Abnormal CT scan of the chest with small left effusion and findings of left basilar linear atelectasis and patchy airspace disease, evaluation of pulmonary parenchyma limited by motion artifact  Severe hyponatremia with dehydration and SIADH  Previous history of unspecified pulmonary problems, treated with empiric bronchodilators  History of seizure disorder  DNR status      Recommendations: On oxygen, wean as tolerated  On empiric antibiotics  On heparin  Bronchodilators as needed with spacer device  Symptomatic management for cough  Repeat ABG to reassess respiratory acidosis  No acute therapies for COVID-19 are currently indicated  Monitor oxygen requirement and clinical course  Nephrology is following  Follow-up chest imaging is recommended in 8 to 12 weeks to reassess pulmonary parenchyma  Consider echocardiogram  Family has been asked to reschedule appointment in the pulmonary clinic to later date  Discussed extensively with patient's family  Discussed with hospitalist  Available to address acute pulmonary issues through the weekend and will otherwise check back on Monday    Active Problem List:     Problem List  Never Reviewed            Codes Class    Respiratory failure ([facility] [address] ICD-10-CM: J96.90  ICD-9-CM: 518.81            Past Medical History:      has a past medical history of Hypertension and Seizure ([facility] [address] Past Surgical History:      has no past surgical history on file. Home Medications:     Prior to Admission medications    Medication Sig Start Date End Date Taking? Authorizing Provider   levETIRAcetam (Keppra) 500 mg tablet Take 500 mg by mouth two (2) times a day. Yes Provider, Historical   multivit with iron,minerals (MULTIVITAMIN AND MINERALS PO) Take  by mouth.    Yes Provider, Historical   pantoprazole (Protonix) 40 mg tablet Take 40 mg by mouth daily. Yes Provider, Historical   fluticasone propion-salmeteroL (ADVAIR/WIXELA) 250-50 mcg/dose diskus inhaler Take 1 Puff by inhalation every twelve (12) hours. Yes Provider, Historical       Allergies/Social/Family History:     No Known Allergies   Social History     Tobacco Use    Smoking status: Never    Smokeless tobacco: Never   Substance Use Topics    Alcohol use: Not on file      History reviewed. No pertinent family history. Objective:   Vital Signs:  Visit Vitals  BP (!) 126/52 (BP 1 Location: Right lower arm, BP Patient Position: At rest)   Pulse 66   Temp 98.1 °F (36.7 °C)   Resp 19   Wt 75 kg (165 lb 5.5 oz)   SpO2 100%   Breastfeeding No    O2 Flow Rate (L/min): 2 l/min O2 Device: Nasal cannula Temp (24hrs), Av.6 °F (37 °C), Min:98.1 °F (36.7 °C), Max:99.1 °F (37.3 °C)           Intake/Output:     Intake/Output Summary (Last 24 hours) at 2022 1145  Last data filed at 2022 0436  Gross per 24 hour   Intake 500 ml   Output 400 ml   Net 100 ml       Pertinent physical exam performed with PPE and COVID 19 distancing precautions as indicated  Physical exam findings of attending physician rounding on patient today reviewed as documented    Physical Exam:   General:  Sleepy, no distress, appears stated age. Tired appearing   Head:  Normocephalic   Lungs:   Symmetrical expansion   Chest wall:  No deformity. Heart:  Regular rate and rhythm   Abdomen:   Non-distended   Extremities: Atraumatic, no cyanosis or edema.    Skin: No rashes or lesions   Neurologic: Grossly nonfocal        LABS AND  DATA: Personally reviewed  Recent Labs     22  0219 22  1205   WBC 4.7 7.0   HGB 12.4 12.8   HCT 38.8 40.2    187     Recent Labs     22  0943 22  0219 22  1205 22  1204   * 123*   < >  --    K 4.1 4.1   < >  --    CL 87* 86*   < >  --    CO2 32 30   < >  --    BUN 13 9   < >  --    CREA 0.80 0.75   < >  --    GLU 68 78   < >  --    CA 8.4* 8.0*   < >  --    MG  --   --   --  1.8    < > = values in this interval not displayed. Recent Labs     09/24/22  0219 09/23/22  1205   AP 73 82   TP 6.0* 7.3   ALB 2.9* 3.5   GLOB 3.1 3.8     No results for input(s): INR, PTP, APTT, INREXT in the last 72 hours. Recent Labs     09/23/22  2133   PHI 7.30*   PCO2I 64.1*   PO2I 73*   FIO2I 2     No results for input(s): CPK, CKMB, TROIQ, BNPP in the last 72 hours. MEDS: Reviewed  Current Facility-Administered Medications   Medication Dose Route Frequency    levETIRAcetam (KEPPRA) injection 500 mg  500 mg IntraVENous Q12H    pantoprazole (PROTONIX) tablet 40 mg  40 mg Oral DAILY    sodium chloride (NS) flush 5-40 mL  5-40 mL IntraVENous Q8H    sodium chloride (NS) flush 5-40 mL  5-40 mL IntraVENous PRN    cefTRIAXone (ROCEPHIN) 1 g in 0.9% sodium chloride 10 mL IV syringe  1 g IntraVENous Q24H    azithromycin (ZITHROMAX) 500 mg in 0.9% sodium chloride 250 mL ([id number])  500 mg IntraVENous Q24H    acetaminophen (TYLENOL) tablet 650 mg  650 mg Oral Q4H PRN    heparin (porcine) injection 5,000 Units  5,000 Units SubCUTAneous Q8H    hydrALAZINE (APRESOLINE) 20 mg/mL injection 10 mg  10 mg IntraVENous Q6H PRN    [Held by provider] 0.9% sodium chloride infusion  50 mL/hr IntraVENous CONTINUOUS    alum-mag hydroxide-simeth (MYLANTA) oral suspension 30 mL  30 mL Oral Q4H PRN       Chest Imaging: personally reviewed and report checked  Results from Hospital Encounter encounter on 09/23/22    XR CHEST PORT    Narrative  EXAM: XR CHEST PORT    INDICATION: sob    COMPARISON: 1211 hours, earlier CT arteriogram of the chest    FINDINGS: A portable AP radiograph of the chest was obtained at 1858 hours. Pulmonary venous congestion is again demonstrated with a borderline appearance  of interstitial pulmonary edema. Small to moderate left pleural effusion is  again noted as is asymmetric patchy left basilar pulmonary densities. There is  no pneumothorax.  Visualized cardiac and mediastinal contours are stable. The  bones are severely osteopenic. Impression  Allowing for differences in patient positioning and projectional  differences, overall stable imaging findings. [facility]22    CTA CHEST W OR W WO CONT    Narrative  EXAM:  CTA CHEST W OR W WO CONT    INDICATION: Elevated d-dimer and shortness of breath with hypoxia    COMPARISON: No comparisons    TECHNIQUE: Helical thin section chest CT following uneventful intravenous  administration of nonionic contrast 100 mL of isovue 370 according to  departmental PE protocol. Coronal and sagittal reformats were performed. 3D post  processing was performed. CT dose reduction was achieved through the use of a  standardized protocol tailored for this examination and automatic exposure  control for dose modulation. FINDINGS: This is a significantly limited quality study for the evaluation of  pulmonary embolism to the proximal segmental arterial level. Evaluation is  limited by motion. No large pulmonary embolus is identified. Borderline size left axillary lymph node. Nodular density left breast measuring  9 mm with ill-defined breast tissue which appears to be asymmetric although the  right breast was incompletely imaged. THYROID: No nodule. MEDIASTINUM: No mass or lymphadenopathy. PHYLICIA: No mass or lymphadenopathy. 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 TRACHEA/BRONCHI: Patent. PLEURA: Small left pleural effusion  LUNGS: Left lower lobe volume loss. Evaluation of the lungs limited by motion. UPPER ABDOMEN: Partially imaged. No acute pathology. BONES: No aggressive bone lesion or fracture. Impression  1. Extensive limitation secondary to motion. No large pulmonary embolus is  identified. 2.  Small left pleural effusion left lower lobe volume loss.     3.  Focal aneurysmal dilatation of the distal thoracic aorta. 4.  Borderline size asymmetric left axillary lymph nodes with possible  asymmetric soft left breast soft tissue in the small 9 mm nodule in the left  breast. Consider correlation with dedicated breast imaging. Tele- reviewed    Medical decision making:   I have reviewed the flowsheet and previous day's notes  Patient has acute or chronic illness that poses a threat to life or bodily function  Review and order of Clinical lab tests  Review and Order of Radiology tests  Independent visualization of Image  Reviewed Oxygen  High Risk Drug therapy requiring intensive monitoring for toxicity: eg steroids, pressors, antibiotics        Thank you for allowing me to participate in this patient's care.     Sushma Tamayo MD      [facility]

## 2022-09-24 NOTE — PROGRESS NOTES
Problem: Falls - Risk of  Goal: *Absence of Falls  Description: Document Josephine Jimenez Fall Risk and appropriate interventions in the flowsheet.   Outcome: Progressing Towards Goal  Note: Fall Risk Interventions:  Mobility Interventions: Bed/chair exit alarm    Mentation Interventions: Bed/chair exit alarm    Medication Interventions: Bed/chair exit alarm    Elimination Interventions: Bed/chair exit alarm, Call light in reach

## 2022-09-24 NOTE — PROGRESS NOTES
[address] [facility]  Hospitalist Group                                                                                          Hospitalist Progress Note  Janice Tabares MD  Answering service: [phone] from in house phone        Date of Service:  2022  NAME:  Ammy Dennis  :  1928  MRN:  [id number]      Admission Summary:   HPI: Ammy Dennis is a 80 y.o. female who presents with shortness of breath. Patient is a poor story and, history was probably obtained from chart review, apparently patient was diagnosed with COVID 4 weeks ago, since last night has had increasing shortness of breath cough and confusion, she has not been eating drinking well, came to the ER and was requested to be admitted to the hospital service, currently confused not much history could be obtained from the patient\"       Interval history / Subjective:   Patient seen examined at bedside mild cough feels weak. Son and daughter-in-law at bedside     Assessment & Plan:      Acute hypoxic respiratory failure  Recent history of COVID-19  Severe hyponatremia -likely siadh   Acute metabolic encephalopathy  Thoracic aortic aneurysm  GERD  -Continue antibiotics Rocephin azithro  -Currently satting well on 2 L wean as tolerated  - Appreciate speech eval passed for regular diet  -As needed antitussives  -rpt abg for tomorrow  -Appreciate pulm  -obtain TTE  - Appreciate nephrology, continue fluid restriction   -start PPI for gerd   -PTOT           Code status: dnr  Prophylaxis: Heparin subcu  Care Plan discussed with: Patient/family, nurse, case management, consultant  Anticipated Disposition: >48 hours     Hospital Problems  Never Reviewed            Codes Class Noted POA    Respiratory failure ([facility] [address] ICD-10-CM: J96.90  ICD-9-CM: 518.81  2022 Unknown             Review of Systems:   A comprehensive review of systems was negative except for that written in the HPI.        Vital Signs: Last 24hrs VS reviewed since prior progress note. Most recent are:  Visit Vitals  BP (!) 136/55 (BP 1 Location: Right upper arm, BP Patient Position: Sitting)   Pulse 93   Temp 98 °F (36.7 °C)   Resp 15   Wt 75 kg (165 lb 5.5 oz)   SpO2 100%   Breastfeeding No         Intake/Output Summary (Last 24 hours) at 9/24/2022 1647  Last data filed at 9/24/2022 0436  Gross per 24 hour   Intake --   Output 400 ml   Net -400 ml        Physical Examination:     I had a face to face encounter with this patient and independently examined them on 9/24/2022 as outlined below:          Constitutional:  No acute distress, cooperative, pleasant    ENT:  Oral mucosa moist, oropharynx benign. Resp:  CTA bilaterally. No wheezing/rhonchi/rales. No accessory muscle use. CV:  Regular rhythm, normal rate, no murmurs, gallops, rubs    GI:  Soft, non distended, non tender. normoactive bowel sounds, no hepatosplenomegaly     Musculoskeletal:  No edema, warm, 2+ pulses throughout    Neurologic:  Moves all extremities. AAOx3, CN II-XII reviewed            Data Review:    Review and/or order of clinical lab test  Review and/or order of tests in the radiology section of CPT  Review and/or order of tests in the medicine section of CPT      Labs:     Recent Labs     09/24/22 0219 09/23/22  1205   WBC 4.7 7.0   HGB 12.4 12.8   HCT 38.8 40.2    187     Recent Labs     09/24/22  0943 09/24/22 0219 09/23/22 1952 09/23/22  1205 09/23/22  1204   * 123* 123*   < >  --    K 4.1 4.1 4.0   < >  --    CL 87* 86* 84*   < >  --    CO2 32 30 30   < >  --    BUN 13 9 8   < >  --    CREA 0.80 0.75 0.70   < >  --    GLU 68 78 91   < >  --    CA 8.4* 8.0* 7.8*   < >  --    MG  --   --   --   --  1.8   URICA  --   --  2.8  --   --     < > = values in this interval not displayed.      Recent Labs     09/24/22 0219 09/23/22  1205   ALT 23 24   AP 73 82   TBILI 0.5 0.7   TP 6.0* 7.3   ALB 2.9* 3.5   GLOB 3.1 3.8     No results for input(s): INR, PTP, APTT, INREXT in the last 72 hours. No results for input(s): FE, TIBC, PSAT, FERR in the last 72 hours. No results found for: FOL, RBCF   No results for input(s): PH, PCO2, PO2 in the last 72 hours. No results for input(s): CPK, CKNDX, TROIQ in the last 72 hours.     No lab exists for component: CPKMB  No results found for: CHOL, CHOLX, CHLST, CHOLV, HDL, HDLP, LDL, LDLC, DLDLP, TGLX, TRIGL, TRIGP, CHHD, CHHDX  No results found for: [facility]  Lab Results   Component Value Date/Time    Color YELLOW/STRAW 09/23/2022 07:52 PM    Appearance CLEAR 09/23/2022 07:52 PM    Specific gravity 1.017 09/23/2022 07:52 PM    pH (UA) 8.5 (H) 09/23/2022 07:52 PM    Protein 30 (A) 09/23/2022 07:52 PM    Glucose Negative 09/23/2022 07:52 PM    Ketone Negative 09/23/2022 07:52 PM    Bilirubin Negative 09/23/2022 07:52 PM    Urobilinogen 0.2 09/23/2022 07:52 PM    Nitrites Negative 09/23/2022 07:52 PM    Leukocyte Esterase Negative 09/23/2022 07:52 PM    Epithelial cells FEW 09/23/2022 07:52 PM    Bacteria Negative 09/23/2022 07:52 PM    WBC 0-4 09/23/2022 07:52 PM    RBC 0-5 09/23/2022 07:52 PM         Medications Reviewed:     Current Facility-Administered Medications   Medication Dose Route Frequency    levETIRAcetam (KEPPRA) injection 500 mg  500 mg IntraVENous Q12H    pantoprazole (PROTONIX) tablet 40 mg  40 mg Oral DAILY    sodium chloride (NS) flush 5-40 mL  5-40 mL IntraVENous Q8H    sodium chloride (NS) flush 5-40 mL  5-40 mL IntraVENous PRN    cefTRIAXone (ROCEPHIN) 1 g in 0.9% sodium chloride 10 mL IV syringe  1 g IntraVENous Q24H    azithromycin (ZITHROMAX) 500 mg in 0.9% sodium chloride 250 mL ([id number])  500 mg IntraVENous Q24H    acetaminophen (TYLENOL) tablet 650 mg  650 mg Oral Q4H PRN    heparin (porcine) injection 5,000 Units  5,000 Units SubCUTAneous Q8H    hydrALAZINE (APRESOLINE) 20 mg/mL injection 10 mg  10 mg IntraVENous Q6H PRN    [Held by provider] 0.9% sodium chloride infusion  50 mL/hr IntraVENous CONTINUOUS    alum-mag hydroxide-simeth (MYLANTA) oral suspension 30 mL  30 mL Oral Q4H PRN     ______________________________________________________________________  EXPECTED LENGTH OF STAY: - - -  ACTUAL LENGTH OF STAY:          1                 Brittney Leon MD

## 2022-09-24 NOTE — PROGRESS NOTES
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 No new issues. Ongoing abd pain per RN. Alert, awake, Na 123 this AM.  Repeat labs due now. Oxygenation stable. Current Medications: all current  Medications have been eviewed in EPIC  Review of Systems: Pertinent items are noted in HPI. Objective:  Vitals:    Vitals:    09/24/22 0100 09/24/22 0211 09/24/22 0413 09/24/22 0600   BP:       Pulse: 74 67 75 81   Resp:       Temp:       SpO2:       Weight:         Intake and Output:  No intake/output data recorded. 09/22 1901 - 09/24 0700  In: 500 [I.V.:500]  Out: 400 [Urine:400]    Physical Examination:  Examined outside room due to COVID-19 isolation:    General: NAD  Resp:  Stable oxygenation  CV:  RRR on monito  Neurologic:  Non focal  Skin:  No Rash  :  No martin    []    High complexity decision making was performed  []    Patient is at high-risk of decompensation with multiple organ involvement    Lab Data Personally Reviewed: I have reviewed all the pertinent labs, microbiology data and radiology studies during assessment.     Recent Labs     09/24/22 0219 09/23/22 1952 09/23/22  1205 09/23/22  1204   * 123* 121*  --    K 4.1 4.0 4.6  --    CL 86* 84* 85*  --    CO2 30 30 33*  --    GLU 78 91 114*  --    BUN 9 8 7  --    CREA 0.75 0.70 0.90  --    CA 8.0* 7.8* 8.4*  --    MG  --   --   --  1.8   ALB 2.9*  --  3.5  --    ALT 23  --  24  --      Recent Labs     09/24/22 0219 09/23/22  1205   WBC 4.7 7.0   HGB 12.4 12.8   HCT 38.8 40.2  187     No results found for: SDES  Lab Results   Component Value Date/Time    Culture result: NO GROWTH AFTER 9 HOURS 09/23/2022 07:52 PM     Recent Results (from the past 24 hour(s))   EKG, 12 LEAD, INITIAL    Collection Time: 09/23/22 11:55 AM   Result Value Ref Range    Ventricular Rate 81 BPM    Atrial Rate 81 BPM    P-R Interval 150 ms    QRS Duration 64 ms    Q-T Interval 342 ms    QTC Calculation (Bezet) 397 ms    Calculated P Axis 66 degrees    Calculated R Axis 82 degrees    Calculated T Axis 89 degrees    Diagnosis       Normal sinus rhythm  Baseline artifact  ** Poor data quality, interpretation may be adversely affected  No previous ECGs available  Confirmed by Saige Ramirez MD. ([id number]) on 9/23/2022 2:57:38 PM     NT-PRO BNP    Collection Time: 09/23/22 12:04 PM   Result Value Ref Range    NT pro-BNP 3,813 (H) 0 - 450 PG/ML   MAGNESIUM    Collection Time: 09/23/22 12:04 PM   Result Value Ref Range    Magnesium 1.8 1.6 - 2.4 mg/dL   PROCALCITONIN    Collection Time: 09/23/22 12:04 PM   Result Value Ref Range    Procalcitonin <0.05 ng/mL   SED RATE (ESR)    Collection Time: 09/23/22 12:04 PM   Result Value Ref Range    Sed rate, automated 9 0 - 30 mm/hr   C REACTIVE PROTEIN, QT    Collection Time: 09/23/22 12:04 PM   Result Value Ref Range    C-Reactive protein 1.16 (H) <0.60 mg/dL   D DIMER    Collection Time: 09/23/22 12:04 PM   Result Value Ref Range    D-dimer 1.00 (H) 0.00 - 0.65 mg/L FEU   CBC WITH AUTOMATED DIFF    Collection Time: 09/23/22 12:05 PM   Result Value Ref Range    WBC 7.0 3.6 - 11.0 K/uL    RBC 4.91 3.80 - 5.20 M/uL    HGB 12.8 11.5 - 16.0 g/dL    HCT 40.2 35.0 - 47.0 %    MCV 81.9 80.0 - 99.0 FL    MCH 26.1 26.0 - 34.0 PG    MCHC 31.8 30.0 - 36.5 g/dL    RDW 15.5 (H) 11.5 - 14.5 %    PLATELET 620 556 - 218 K/uL    MPV 9.3 8.9 - 12.9 FL    NRBC 0.0 0  WBC    ABSOLUTE NRBC 0.00 0.00 - 0.01 K/uL    NEUTROPHILS 78 (H) 32 - 75 %    LYMPHOCYTES 7 (L) 12 - 49 % MONOCYTES 15 (H) 5 - 13 %    EOSINOPHILS 0 0 - 7 %    BASOPHILS 0 0 - 1 %    IMMATURE GRANULOCYTES 0 0.0 - 0.5 %    ABS. NEUTROPHILS 5.4 1.8 - 8.0 K/UL    ABS. LYMPHOCYTES 0.5 (L) 0.8 - 3.5 K/UL    ABS. MONOCYTES 1.1 (H) 0.0 - 1.0 K/UL    ABS. EOSINOPHILS 0.0 0.0 - 0.4 K/UL    ABS. BASOPHILS 0.0 0.0 - 0.1 K/UL    ABS. IMM. GRANS. 0.0 0.00 - 0.04 K/UL    DF SMEAR SCANNED      PLATELET COMMENTS Large Platelets      RBC COMMENTS ANISOCYTOSIS  1+       METABOLIC PANEL, COMPREHENSIVE    Collection Time: 09/23/22 12:05 PM   Result Value Ref Range    Sodium 121 (L) 136 - 145 mmol/L    Potassium 4.6 3.5 - 5.1 mmol/L    Chloride 85 (L) 97 - 108 mmol/L    CO2 33 (H) 21 - 32 mmol/L    Anion gap 3 (L) 5 - 15 mmol/L    Glucose 114 (H) 65 - 100 mg/dL    BUN 7 6 - 20 MG/DL    Creatinine 0.90 0.55 - 1.02 MG/DL    BUN/Creatinine ratio 8 (L) 12 - 20      GFR est AA >60 >60 ml/min/1.73m2    GFR est non-AA 58 (L) >60 ml/min/1.73m2    Calcium 8.4 (L) 8.5 - 10.1 MG/DL    Bilirubin, total 0.7 0.2 - 1.0 MG/DL    ALT (SGPT) 24 12 - 78 U/L    AST (SGOT) 25 15 - 37 U/L    Alk.  phosphatase 82 45 - 117 U/L    Protein, total 7.3 6.4 - 8.2 g/dL    Albumin 3.5 3.5 - 5.0 g/dL    Globulin 3.8 2.0 - 4.0 g/dL    A-G Ratio 0.9 (L) 1.1 - 2.2     TROPONIN-HIGH SENSITIVITY    Collection Time: 09/23/22 12:05 PM   Result Value Ref Range    Troponin-High Sensitivity 24 0 - 51 ng/L   LACTIC ACID    Collection Time: 09/23/22 12:05 PM   Result Value Ref Range    Lactic acid 1.4 0.4 - 2.0 MMOL/L   COVID-19 RAPID TEST    Collection Time: 09/23/22 12:05 PM   Result Value Ref Range    Specimen source Nasopharyngeal      COVID-19 rapid test Detected (A) NOTD     POC VENOUS BLOOD GAS    Collection Time: 09/23/22 12:06 PM   Result Value Ref Range    pH, venous (POC) 7.37 7.32 - 7.42      pCO2, venous (POC) 61.0 (H) 41 - 51 MMHG    pO2, venous (POC) 31 25 - 40 mmHg    HCO3, venous (POC) 35.2 (H) 23.0 - 28.0 MMOL/L    sO2, venous (POC) 55.7 (L) 65 - 88 %    Base excess, venous (POC) 7.1 mmol/L    Specimen type (POC) VENOUS BLOOD      Performed by Peyton Graves RN    OSMOLALITY, SERUM/PLASMA    Collection Time: 09/23/22  7:45 PM   Result Value Ref Range    Osmolality, serum/plasma 258 mOsm/kg H2O   CULTURE, BLOOD, PAIRED    Collection Time: 09/23/22  7:52 PM    Specimen: Blood   Result Value Ref Range    Special Requests: NO SPECIAL REQUESTS      Culture result: NO GROWTH AFTER 9 HOURS     URINALYSIS W/MICROSCOPIC    Collection Time: 09/23/22  7:52 PM   Result Value Ref Range    Color YELLOW/STRAW      Appearance CLEAR CLEAR      Specific gravity 1.017 1.003 - 1.030      pH (UA) 8.5 (H) 5.0 - 8.0      Protein 30 (A) NEG mg/dL    Glucose Negative NEG mg/dL    Ketone Negative NEG mg/dL    Bilirubin Negative NEG      Blood TRACE (A) NEG      Urobilinogen 0.2 0.2 - 1.0 EU/dL    Nitrites Negative NEG      Leukocyte Esterase Negative NEG      WBC 0-4 0 - 4 /hpf    RBC 0-5 0 - 5 /hpf    Epithelial cells FEW FEW /lpf    Bacteria Negative NEG /hpf    Hyaline cast 0-2 0 - 5 /lpf   PROCALCITONIN    Collection Time: 09/23/22  7:52 PM   Result Value Ref Range    Procalcitonin <1.01 ng/mL   METABOLIC PANEL, BASIC    Collection Time: 09/23/22  7:52 PM   Result Value Ref Range    Sodium 123 (L) 136 - 145 mmol/L    Potassium 4.0 3.5 - 5.1 mmol/L    Chloride 84 (L) 97 - 108 mmol/L    CO2 30 21 - 32 mmol/L    Anion gap 9 5 - 15 mmol/L    Glucose 91 65 - 100 mg/dL    BUN 8 6 - 20 MG/DL    Creatinine 0.70 0.55 - 1.02 MG/DL    BUN/Creatinine ratio 11 (L) 12 - 20      GFR est AA >60 >60 ml/min/1.73m2    GFR est non-AA >60 >60 ml/min/1.73m2    Calcium 7.8 (L) 8.5 - 10.1 MG/DL   SODIUM, UR, RANDOM    Collection Time: 09/23/22  7:52 PM   Result Value Ref Range    Sodium,urine random 112 MMOL/L   POTASSIUM, UR, RANDOM    Collection Time: 09/23/22  7:52 PM   Result Value Ref Range    Potassium urine, random 14 MMOL/L   OSMOLALITY, UR    Collection Time: 09/23/22  7:52 PM   Result Value Ref Range Osmolality,urine 314 MOSM/kg H2O   TSH 3RD GENERATION    Collection Time: 09/23/22  7:52 PM   Result Value Ref Range    TSH 0.35 (L) 0.36 - 3.74 uIU/mL   URIC ACID    Collection Time: 09/23/22  7:52 PM   Result Value Ref Range    Uric acid 2.8 2.6 - 6.0 MG/DL   TROPONIN-HIGH SENSITIVITY    Collection Time: 09/23/22  9:27 PM   Result Value Ref Range    Troponin-High Sensitivity 43 0 - 51 ng/L   URINE CULTURE HOLD SAMPLE    Collection Time: 09/23/22  9:27 PM    Specimen: Serum   Result Value Ref Range    Urine culture hold        Urine on hold in Microbiology dept for 2 days. If unpreserved urine is submitted, it cannot be used for addtional testing after 24 hours, recollection will be required. POC G3 - PUL    Collection Time: 09/23/22  9:33 PM   Result Value Ref Range    FIO2 (POC) 2 %    pH (POC) 7.30 (L) 7.35 - 7.45      pCO2 (POC) 64.1 (H) 35.0 - 45.0 MMHG    pO2 (POC) 73 (L) 80 - 100 MMHG    HCO3 (POC) 31.7 (H) 22 - 26 MMOL/L    sO2 (POC) 92.0 92 - 97 %    Base excess (POC) 3.3 mmol/L    Site RIGHT BRACHIAL      Device: NASAL CANNULA      Allens test (POC) NOT APPLICABLE      Specimen type (POC) ARTERIAL     METABOLIC PANEL, COMPREHENSIVE    Collection Time: 09/24/22  2:19 AM   Result Value Ref Range    Sodium 123 (L) 136 - 145 mmol/L    Potassium 4.1 3.5 - 5.1 mmol/L    Chloride 86 (L) 97 - 108 mmol/L    CO2 30 21 - 32 mmol/L    Anion gap 7 5 - 15 mmol/L    Glucose 78 65 - 100 mg/dL    BUN 9 6 - 20 MG/DL    Creatinine 0.75 0.55 - 1.02 MG/DL    BUN/Creatinine ratio 12 12 - 20      GFR est AA >60 >60 ml/min/1.73m2    GFR est non-AA >60 >60 ml/min/1.73m2    Calcium 8.0 (L) 8.5 - 10.1 MG/DL    Bilirubin, total 0.5 0.2 - 1.0 MG/DL    ALT (SGPT) 23 12 - 78 U/L    AST (SGOT) 26 15 - 37 U/L    Alk.  phosphatase 73 45 - 117 U/L    Protein, total 6.0 (L) 6.4 - 8.2 g/dL    Albumin 2.9 (L) 3.5 - 5.0 g/dL    Globulin 3.1 2.0 - 4.0 g/dL    A-G Ratio 0.9 (L) 1.1 - 2.2     CBC WITH AUTOMATED DIFF    Collection Time: 09/24/22 2:19 AM   Result Value Ref Range    WBC 4.7 3.6 - 11.0 K/uL    RBC 4.66 3.80 - 5.20 M/uL    HGB 12.4 11.5 - 16.0 g/dL    HCT 38.8 35.0 - 47.0 %    MCV 83.3 80.0 - 99.0 FL    MCH 26.6 26.0 - 34.0 PG    MCHC 32.0 30.0 - 36.5 g/dL    RDW 15.3 (H) 11.5 - 14.5 %    PLATELET 978 726 - 644 K/uL    MPV 8.7 (L) 8.9 - 12.9 FL    NRBC 0.0 0  WBC    ABSOLUTE NRBC 0.00 0.00 - 0.01 K/uL    NEUTROPHILS 69 32 - 75 %    LYMPHOCYTES 16 12 - 49 %    MONOCYTES 15 (H) 5 - 13 %    EOSINOPHILS 0 0 - 7 %    BASOPHILS 0 0 - 1 %    IMMATURE GRANULOCYTES 0 0.0 - 0.5 %    ABS. NEUTROPHILS 3.2 1.8 - 8.0 K/UL    ABS. LYMPHOCYTES 0.8 0.8 - 3.5 K/UL    ABS. MONOCYTES 0.7 0.0 - 1.0 K/UL    ABS. EOSINOPHILS 0.0 0.0 - 0.4 K/UL    ABS. BASOPHILS 0.0 0.0 - 0.1 K/UL    ABS. IMM. GRANS. 0.0 0.00 - 0.04 K/UL    DF SMEAR SCANNED      RBC COMMENTS ANISOCYTOSIS  1+       TROPONIN-HIGH SENSITIVITY    Collection Time: 09/24/22  2:19 AM   Result Value Ref Range    Troponin-High Sensitivity 42 0 - 51 ng/L   CORTISOL, AM    Collection Time: 09/24/22  2:19 AM   Result Value Ref Range    Cortisol, a.m. 18.2 4.30 - 22.45 ug/dL             Jose Craig MD  [facility]   [address] [facility], [address]  Phone - [phone]   Fax - [phone]  [email or web address]

## 2022-09-24 NOTE — PROGRESS NOTES
Verbal shift change report given to Suri Quiles (oncoming nurse) by Vince Hernandez RN (offgoing nurse). Report included the following information SBAR, Kardex, ED Summary, Procedure Summary, MAR, Recent Results, and Cardiac Rhythm NSR .

## 2022-09-24 NOTE — PROGRESS NOTES
2142 Family requested the CT of the abdomen be done in the morning due to wanting the patient to rest. Nurse explained the reason for the CT, but they would prefer to wait until morning. Nelia Carney NP notified.

## 2022-09-24 NOTE — CONSULTS
NEPHROLOGY CONSULT NOTE     Patient: Leora Rangel MRN: [id number]  PCP: Alec Simms MD   :     [id number]  Age:   80 y.o. Sex:  female      Referring physician: Toni Bartlett MD  Reason for consultation: 80 y.o. female with Respiratory failure ([facility] [address] [V57.79] complicated by FARRAH   Admission Date: 2022 11:42 AM  LOS: 0 days     DISCUSSION / PLAN :   Acute Hyponatremia  - Presenting serum sodium 121, has remote history of hyponatremia in 2019 per son  secondary to dehydration  - Suspect in the setting of SIADH related to nausea and COVID leading to ADH release +/- increase fluid intake, also on Keppra at home   - Received 1-liter IVF bolus of NS in the ER and then started on mIVF @ 75 ml/hr  - Repeat BMP now and hold off on any further fluids   - Check urine osm, potassium, sodium and UA-- pending for electrolyte ratio  - Check serum osm, uric acid, TSH ans am cortisol    - Strict I/Os, monitor urinary output closely   - Goal rate of correction no more than 6 mmol/L in 24 hours   - Trend serial sodium every 6-hours  - Notify on call nephrology with repeat results    Acute hypoxic respiratory failure  COVID 19+  - On empiric antibiotics  - continue supplemental oxygen     Hypertension   - prn antihypertensive     GERD  nausea  - continue Protonix  - prn zofran            Subjective:   HPI: Leora Rangel is a 80 y.o.  female who has a PMHx significant for hypertension, GERD, and seizure disorder. Presented to the ED with chief complaint of shortness of breath. Patient was diagnosed with COVID 4- weeks ago and recovered per family. However, a few days ago patient began coughing and having generalized weakness that worsened today prompting visit to the ER. On exam patient alert and able to answer questions, asking to eat. Family is at bedside to give more history. Son states that patient has been drinking a lot of water this last week due to indigestion and associated nausea.    On arrival to the ER patient was found to be hypoxic, with oxygen saturation int he 80s, she was placed on 4L NC with improvement. Work up in the ER revealed patient to have a sodium of 121, normal renal function. CT of the chest, negative for PE, small left pleural effusion. Chest x-ray pending  Nephrology was consulted for the management of hyponatremia  - Presenting serum sodium 121, has had remote history of hyponatremia in the past (2019)  - Patient received 1-liter NS in the ER   - She has good urinary output, roughly 1-liter in the canister at bedside  - She endorses shortness of breath, cough, and indigestion. Denies any edema or urinary changes  - Medication list reviewed. No thiazides, NSAIDs, or SSRI. Is on Keppra for seizures. Past Medical Hx:   Past Medical History:   Diagnosis Date    Hypertension     Seizure [facility])         Past Surgical Hx:   History reviewed. No pertinent surgical history. Medications:  Prior to Admission medications    Medication Sig Start Date End Date Taking? Authorizing Provider   levETIRAcetam (Keppra) 500 mg tablet Take 500 mg by mouth two (2) times a day. Yes Provider, Historical   multivit with iron,minerals (MULTIVITAMIN AND MINERALS PO) Take  by mouth. Yes Provider, Historical   pantoprazole (Protonix) 40 mg tablet Take 40 mg by mouth daily. Yes Provider, Historical   fluticasone propion-salmeteroL (ADVAIR/WIXELA) 250-50 mcg/dose diskus inhaler Take 1 Puff by inhalation every twelve (12) hours. Yes Provider, Historical       No Known Allergies    Social Hx:  reports that she has never smoked. She has never used smokeless tobacco.     History reviewed. No pertinent family history. Review of Systems:  A twelve point review of system was performed today. Pertinent positives and negatives are mentioned in the HPI. The reminder of the ROS is negative and noncontributory.      Objective:    Vitals:    Vitals:    09/23/22 1530 09/23/22 1600 09/23/22 1713 09/23/22 1820   BP: (!) 180/89    Pulse: 85 91 82 79   Resp: 17  (!) 35    Temp:   99.1 °F (37.3 °C)    SpO2: 100%      Weight:         I&O's:  No intake/output data recorded. Visit Vitals  BP (!) 180/89   Pulse 79   Temp 99.1 °F (37.3 °C)   Resp (!) 35   Wt 75 kg (165 lb 5.5 oz)   SpO2 100%   Breastfeeding No       Physical Exam:  General:Alert, No distress  HEENT: Eyes are PERRL. Conjunctiva without pallor ,erythema. Neck: Supple,no mass palpable  Lungs : Rales, increased work of breathing  CVS: RRR, S1 S2 normal, No rub, no LE edema  Abdomen: Soft, Non tender, bowel sounds present  Extremities: No cyanosis, No clubbing  Skin: No rash or lesions. Neurologic: non focal, AAO x 3  Psych: normal affect    Laboratory Results:    Lab Results   Component Value Date    BUN 7 09/23/2022     (L) 09/23/2022    K 4.6 09/23/2022    CL 85 (L) 09/23/2022    CO2 33 (H) 09/23/2022       Lab Results   Component Value Date    BUN 7 09/23/2022    K 4.6 09/23/2022       Lab Results   Component Value Date    WBC 7.0 09/23/2022    RBC 4.91 09/23/2022    HGB 12.8 09/23/2022    HCT 40.2 09/23/2022    MCV 81.9 09/23/2022    MCH 26.1 09/23/2022    RDW 15.5 (H) 09/23/2022     09/23/2022       No results found for: PTH, PHOS    Urine dipstick:   No results found for: COLOR, APPRN, SPGRU, REFSG, LAVONNE, PROTU, GLUCU, KETU, BILU, UROU, PENNY, LEUKU, GLUKE, EPSU, BACTU, WBCU, RBCU, CASTS, UCRY    I have reviewed the following: All pertinent labs, microbiology data, radiology imaging for my assessment     ECG- Rev: Yes / No  Xray/CT/US/MRI REV: Yes/ No    Care Plan discussed with:  patient, family      Chart reviewed. Medications list Personally Reviewed   [x]      Yes     []               No      Thank you for allowing us to participate in the care of this patient. We will follow patient.  Please dont hesitate to call with any questions    Trupti Godinez NP  9/23/2022    [facility]  [address], [place] [address]  Phone - [phone]  Fax - [phone]  [email or web address]

## 2022-09-25 ENCOUNTER — APPOINTMENT (OUTPATIENT)
Dept: NON INVASIVE DIAGNOSTICS | Age: 87
DRG: 189 | End: 2022-09-25
Attending: STUDENT IN AN ORGANIZED HEALTH CARE EDUCATION/TRAINING PROGRAM
Payer: COMMERCIAL

## 2022-09-25 LAB
ANION GAP SERPL CALC-SCNC: 4 MMOL/L (ref 5–15)
ARTERIAL PATENCY WRIST A: POSITIVE
BASE EXCESS BLD CALC-SCNC: 3.6 MMOL/L
BASOPHILS # BLD: 0.1 K/UL (ref 0–0.1)
BASOPHILS NFR BLD: 1 % (ref 0–1)
BDY SITE: ABNORMAL
BUN SERPL-MCNC: 18 MG/DL (ref 6–20)
BUN/CREAT SERPL: 20 (ref 12–20)
CALCIUM SERPL-MCNC: 8.3 MG/DL (ref 8.5–10.1)
CHLORIDE SERPL-SCNC: 87 MMOL/L (ref 97–108)
CO2 SERPL-SCNC: 36 MMOL/L (ref 21–32)
CREAT SERPL-MCNC: 0.91 MG/DL (ref 0.55–1.02)
DIFFERENTIAL METHOD BLD: ABNORMAL
EOSINOPHIL # BLD: 0 K/UL (ref 0–0.4)
EOSINOPHIL NFR BLD: 0 % (ref 0–7)
ERYTHROCYTE [DISTWIDTH] IN BLOOD BY AUTOMATED COUNT: 15.7 % (ref 11.5–14.5)
GAS FLOW.O2 O2 DELIVERY SYS: ABNORMAL L/MIN
GLUCOSE SERPL-MCNC: 94 MG/DL (ref 65–100)
HCO3 BLD-SCNC: 34.9 MMOL/L (ref 22–26)
HCT VFR BLD AUTO: 41.4 % (ref 35–47)
HGB BLD-MCNC: 12.6 G/DL (ref 11.5–16)
IMM GRANULOCYTES # BLD AUTO: 0.1 K/UL (ref 0–0.04)
IMM GRANULOCYTES NFR BLD AUTO: 2 % (ref 0–0.5)
LYMPHOCYTES # BLD: 0.8 K/UL (ref 0.8–3.5)
LYMPHOCYTES NFR BLD: 15 % (ref 12–49)
MCH RBC QN AUTO: 26.1 PG (ref 26–34)
MCHC RBC AUTO-ENTMCNC: 30.4 G/DL (ref 30–36.5)
MCV RBC AUTO: 85.9 FL (ref 80–99)
MONOCYTES # BLD: 0.9 K/UL (ref 0–1)
MONOCYTES NFR BLD: 16 % (ref 5–13)
NEUTS SEG # BLD: 3.6 K/UL (ref 1.8–8)
NEUTS SEG NFR BLD: 66 % (ref 32–75)
NRBC # BLD: 0 K/UL (ref 0–0.01)
NRBC BLD-RTO: 0 PER 100 WBC
PCO2 BLD: 88.2 MMHG (ref 35–45)
PH BLD: 7.21 [PH] (ref 7.35–7.45)
PLATELET # BLD AUTO: 170 K/UL (ref 150–400)
PMV BLD AUTO: 9.9 FL (ref 8.9–12.9)
PO2 BLD: 96 MMHG (ref 80–100)
POTASSIUM SERPL-SCNC: 4.1 MMOL/L (ref 3.5–5.1)
RBC # BLD AUTO: 4.82 M/UL (ref 3.8–5.2)
RBC MORPH BLD: ABNORMAL
SAO2 % BLD: 94.9 % (ref 92–97)
SERVICE CMNT-IMP: ABNORMAL
SODIUM SERPL-SCNC: 127 MMOL/L (ref 136–145)
SPECIMEN TYPE: ABNORMAL
WBC # BLD AUTO: 5.5 K/UL (ref 3.6–11)

## 2022-09-25 PROCEDURE — 74011250636 HC RX REV CODE- 250/636: Performed by: FAMILY MEDICINE

## 2022-09-25 PROCEDURE — 36600 WITHDRAWAL OF ARTERIAL BLOOD: CPT

## 2022-09-25 PROCEDURE — 74011250637 HC RX REV CODE- 250/637: Performed by: STUDENT IN AN ORGANIZED HEALTH CARE EDUCATION/TRAINING PROGRAM

## 2022-09-25 PROCEDURE — 82803 BLOOD GASES ANY COMBINATION: CPT

## 2022-09-25 PROCEDURE — 65660000001 HC RM ICU INTERMED STEPDOWN

## 2022-09-25 PROCEDURE — 94660 CPAP INITIATION&MGMT: CPT

## 2022-09-25 PROCEDURE — 74011000250 HC RX REV CODE- 250: Performed by: FAMILY MEDICINE

## 2022-09-25 PROCEDURE — 74011250637 HC RX REV CODE- 250/637: Performed by: FAMILY MEDICINE

## 2022-09-25 PROCEDURE — 85025 COMPLETE CBC W/AUTO DIFF WBC: CPT

## 2022-09-25 PROCEDURE — 36415 COLL VENOUS BLD VENIPUNCTURE: CPT

## 2022-09-25 PROCEDURE — 74011250636 HC RX REV CODE- 250/636: Performed by: STUDENT IN AN ORGANIZED HEALTH CARE EDUCATION/TRAINING PROGRAM

## 2022-09-25 PROCEDURE — 74011000258 HC RX REV CODE- 258: Performed by: FAMILY MEDICINE

## 2022-09-25 PROCEDURE — 80048 BASIC METABOLIC PNL TOTAL CA: CPT

## 2022-09-25 PROCEDURE — C8929 TTE W OR WO FOL WCON,DOPPLER: HCPCS

## 2022-09-25 RX ORDER — LEVETIRACETAM 500 MG/5ML
500 INJECTION, SOLUTION, CONCENTRATE INTRAVENOUS EVERY 12 HOURS
Status: DISCONTINUED | OUTPATIENT
Start: 2022-09-25 | End: 2022-09-26 | Stop reason: HOSPADM

## 2022-09-25 RX ADMIN — PANTOPRAZOLE SODIUM 40 MG: 40 TABLET, DELAYED RELEASE ORAL at 06:25

## 2022-09-25 RX ADMIN — LEVETIRACETAM 500 MG: 100 INJECTION, SOLUTION INTRAVENOUS at 22:22

## 2022-09-25 RX ADMIN — SODIUM CHLORIDE, PRESERVATIVE FREE 10 ML: 5 INJECTION INTRAVENOUS at 22:23

## 2022-09-25 RX ADMIN — SODIUM CHLORIDE, PRESERVATIVE FREE 10 ML: 5 INJECTION INTRAVENOUS at 15:13

## 2022-09-25 RX ADMIN — SODIUM CHLORIDE, PRESERVATIVE FREE 10 ML: 5 INJECTION INTRAVENOUS at 06:00

## 2022-09-25 RX ADMIN — AZITHROMYCIN MONOHYDRATE 500 MG: 500 INJECTION, POWDER, LYOPHILIZED, FOR SOLUTION INTRAVENOUS at 20:57

## 2022-09-25 RX ADMIN — GUAIFENESIN 600 MG: 600 TABLET, EXTENDED RELEASE ORAL at 10:34

## 2022-09-25 RX ADMIN — HEPARIN SODIUM 5000 UNITS: 5000 INJECTION INTRAVENOUS; SUBCUTANEOUS at 10:34

## 2022-09-25 RX ADMIN — PANTOPRAZOLE SODIUM 40 MG: 40 TABLET, DELAYED RELEASE ORAL at 10:34

## 2022-09-25 RX ADMIN — LEVETIRACETAM 500 MG: 500 TABLET, FILM COATED ORAL at 10:34

## 2022-09-25 RX ADMIN — HEPARIN SODIUM 5000 UNITS: 5000 INJECTION INTRAVENOUS; SUBCUTANEOUS at 02:00

## 2022-09-25 RX ADMIN — HEPARIN SODIUM 5000 UNITS: 5000 INJECTION INTRAVENOUS; SUBCUTANEOUS at 20:57

## 2022-09-25 RX ADMIN — CEFTRIAXONE SODIUM 1 G: 1 INJECTION, POWDER, FOR SOLUTION INTRAMUSCULAR; INTRAVENOUS at 20:57

## 2022-09-25 NOTE — PROGRESS NOTES
Occupational Therapy    Chart reviewed, patient now requiring BiPAP with plans on transferring to [facility]. Will defer and follow up as able/appropraite.      Alexandra Lennon MS, OTR/L

## 2022-09-25 NOTE — CONSULTS
PULMONARY MEDICINE    Physician Consultation Note    Name: Peri Melchor   : 1928   MRN: [id number]   Date: 2022      Subjective:     Seen and examined  Awake, weak, no pain  CT reviewed - cardiomegaly, mild left basilar fibrotic change, tiny effusion  ABG pending    Consult Note:   Requesting Physician: jose  Reason for consult: Dr. Dalia Oconnor records and data reviewed. Patient is a 80 y.o. female who presented to the hospital with shortness of breath. Patient initially tested positive for COVID-19 4 weeks ago when everyone in the family had infection when school started. She had mild symptoms then. Since then she has had failure to thrive with poor oral intake, shortness of breath and limited endurance. Her family also reports cough productive of clear expectoration. 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 She was also noted to be hypoxic requiring 2 L of nasal cannula oxygen. She underwent CT angiogram of the chest that did not show pulmonary emboli, but did show findings of small left-sided pleural effusion with basilar atelectatic changes as well as cardiomegaly. CT scan was limited by motion artifact. She has undergone CT of the abdomen and lower lung cuts will be reviewed. ABG had shown presence of partially compensated respiratory acidosis. She has never smoked, is visiting the [address] [place] [address],[address] from [facility], has not had COVID-19 infection prior to this current episode. She has had a history of recurrent bronchitis and has a prescription for Serevent that she is using for shortness of breath. She was to see Dr. Colt Minaya in the office next week    Review of Systems:     A comprehensive 12 system review of systems was mated from the patient.   Family contributed to history    Assessment:   Acute hypoxic hypercarbic pulmonary insufficiency  COVID-19 infection, initial positive test was 4 weeks ago, she tested positive with a rapid test on presentation to the hospital, possibilities include delayed clearance of initial viral infection or reinfection, CRP is 1.16 and procalcitonin is normal  Abnormal CT scan of the chest with small left effusion and findings of left basilar linear atelectasis and patchy airspace disease, evaluation of pulmonary parenchyma limited by motion artifact  Severe hyponatremia with dehydration and SIADH  Previous history of unspecified pulmonary problems, treated with empiric bronchodilators  History of seizure disorder  DNR status      Recommendations: On oxygen, wean as tolerated  On empiric antibiotics  On heparin  Incentive spirometer  Bronchodilators as needed with spacer device  Symptomatic management for cough  Repeat ABG to reassess respiratory acidosis - bipap as needed  No acute therapies for COVID-19 are currently indicated  Monitor oxygen requirement and clinical course  Nephrology is following  Follow-up chest imaging is recommended in 8 to 12 weeks to reassess pulmonary parenchyma  Consider echocardiogram  Physical therapy  Family has been asked to reschedule appointment in the pulmonary clinic to later date  Discussed extensively with patient's family  Discussed with hospitalist on 9/25  Available to address acute pulmonary issues through the weekend and will otherwise check back on Monday    Active Problem List:     Problem List  Never Reviewed            Codes Class    Respiratory failure ([facility] [address] ICD-10-CM: J96.90  ICD-9-CM: 518.81          Past Medical History:      has a past medical history of Hypertension and Seizure ([facility] [address] Past Surgical History:      has no past surgical history on file. Home Medications:     Prior to Admission medications    Medication Sig Start Date End Date Taking? Authorizing Provider   levETIRAcetam (Keppra) 500 mg tablet Take 500 mg by mouth two (2) times a day. Yes Provider, Historical   multivit with iron,minerals (MULTIVITAMIN AND MINERALS PO) Take  by mouth. Yes Provider, Historical   pantoprazole (Protonix) 40 mg tablet Take 40 mg by mouth daily. Yes Provider, Historical   fluticasone propion-salmeteroL (ADVAIR/WIXELA) 250-50 mcg/dose diskus inhaler Take 1 Puff by inhalation every twelve (12) hours. Yes Provider, Historical       Allergies/Social/Family History:     No Known Allergies   Social History     Tobacco Use    Smoking status: Never    Smokeless tobacco: Never   Substance Use Topics    Alcohol use: Not on file      History reviewed. No pertinent family history. Objective:   Vital Signs:  Visit Vitals  /61 (BP 1 Location: Right upper arm, BP Patient Position: At rest)   Pulse 81   Temp 98.5 °F (36.9 °C)   Resp 25   Wt 75.9 kg (167 lb 4.8 oz)   SpO2 98%   Breastfeeding No    O2 Flow Rate (L/min): 4 l/min O2 Device: Nasal cannula Temp (24hrs), Av.3 °F (36.8 °C), Min:98 °F (36.7 °C), Max:98.6 °F (37 °C)           Intake/Output:     Intake/Output Summary (Last 24 hours) at 2022 1057  Last data filed at 2022 1045  Gross per 24 hour   Intake --   Output 1950 ml   Net -1950 ml         Pertinent physical exam performed with PPE and COVID 19 distancing precautions as indicated  Physical exam findings of attending physician rounding on patient today reviewed as documented    Physical Exam:   General:  Sleepy, no distress, appears stated age. Tired appearing   Head:  Normocephalic   Lungs:   Symmetrical expansion   Chest wall:  No deformity. Heart:  Regular rate and rhythm   Abdomen:   Non-distended   Extremities: Atraumatic, no cyanosis or edema.    Skin: No rashes or lesions   Neurologic: Grossly nonfocal        LABS AND  DATA: Personally reviewed  Recent Labs     22  0415 22  0219   WBC 5.5 4.7   HGB 12.6 12.4   HCT 41.4 38.8    157       Recent Labs     22  0415 22  1638 22  1205 22  1204 * 125*   < >  --    K 4.1 3.9   < >  --    CL 87* 88*   < >  --    CO2 36* 34*   < >  --    BUN 18 18   < >  --    CREA 0.91 0.98   < >  --    GLU 94 109*   < >  --    CA 8.3* 8.3*   < >  --    MG  --   --   --  1.8    < > = values in this interval not displayed. Recent Labs     09/24/22  0219 09/23/22  1205   AP 73 82   TP 6.0* 7.3   ALB 2.9* 3.5   GLOB 3.1 3.8       No results for input(s): INR, PTP, APTT, INREXT, INREXT in the last 72 hours. Recent Labs     09/25/22  1026 09/23/22  2133   PHI 7.21* 7.30*   PCO2I 88.2* 64.1*   PO2I 96 73*   FIO2I  --  2       No results for input(s): CPK, CKMB, TROIQ, BNPP in the last 72 hours.     MEDS: Reviewed  Current Facility-Administered Medications   Medication Dose Route Frequency    benzonatate (TESSALON) capsule 100 mg  100 mg Oral TID PRN    guaiFENesin-dextromethorphan (ROBITUSSIN DM) 100-10 mg/5 mL syrup 10 mL  10 mL Oral Q6H PRN    albuterol (PROVENTIL HFA, VENTOLIN HFA, PROAIR HFA) inhaler 2 Puff  2 Puff Inhalation Q4H PRN    levETIRAcetam (KEPPRA) tablet 500 mg  500 mg Oral BID    pantoprazole (PROTONIX) tablet 40 mg  40 mg Oral ACB    guaiFENesin ER (MUCINEX) tablet 600 mg  600 mg Oral Q12H    pantoprazole (PROTONIX) tablet 40 mg  40 mg Oral DAILY    sodium chloride (NS) flush 5-40 mL  5-40 mL IntraVENous Q8H    sodium chloride (NS) flush 5-40 mL  5-40 mL IntraVENous PRN    cefTRIAXone (ROCEPHIN) 1 g in 0.9% sodium chloride 10 mL IV syringe  1 g IntraVENous Q24H    azithromycin (ZITHROMAX) 500 mg in 0.9% sodium chloride 250 mL ([id number])  500 mg IntraVENous Q24H    acetaminophen (TYLENOL) tablet 650 mg  650 mg Oral Q4H PRN    heparin (porcine) injection 5,000 Units  5,000 Units SubCUTAneous Q8H    hydrALAZINE (APRESOLINE) 20 mg/mL injection 10 mg  10 mg IntraVENous Q6H PRN    alum-mag hydroxide-simeth (MYLANTA) oral suspension 30 mL  30 mL Oral Q4H PRN       Chest Imaging: personally reviewed and report checked  Results from [facility] Encounter encounter on 09/23/22    XR CHEST PORT    Narrative  EXAM: XR CHEST PORT    INDICATION: sob    COMPARISON: 1211 hours, earlier CT arteriogram of the chest    FINDINGS: A portable AP radiograph of the chest was obtained at 1858 hours. Pulmonary venous congestion is again demonstrated with a borderline appearance  of interstitial pulmonary edema. Small to moderate left pleural effusion is  again noted as is asymmetric patchy left basilar pulmonary densities. There is  no pneumothorax. Visualized cardiac and mediastinal contours are stable. The  bones are severely osteopenic. Impression  Allowing for differences in patient positioning and projectional  differences, overall stable imaging findings. Results from [place] encounter on 09/23/22    CT ABD PELV WO CONT    Narrative  EXAM: CT ABD PELV WO CONT    INDICATION: abd pain    COMPARISON: No comparisons    CONTRAST:  None. TECHNIQUE:  Thin axial images were obtained through the abdomen and pelvis. Coronal and  sagittal reformats were generated. Oral contrast was not administered. CT dose  reduction was achieved through use of a standardized protocol tailored for this  examination and automatic exposure control for dose modulation. The absence of intravenous contrast material reduces the sensitivity for  evaluation of the vasculature and solid organs. FINDINGS:  LOWER THORAX: Small left pleural effusion. Left lower lobe volume loss. Cardiomegaly. Focal aneurysmal dilatation of the distal thoracic aorta  LIVER: Mildly nodular contour of the liver. Evaluation is limited without  intravenous contrast.  BILIARY TREE: Gallbladder is within normal limits. CBD is not dilated. SPLEEN: within normal limits. PANCREAS: No focal abnormality. ADRENALS: Unremarkable. KIDNEYS/URETERS: No calculus or hydronephrosis. STOMACH: Unremarkable. SMALL BOWEL: No dilatation or wall thickening. COLON: No dilatation or wall thickening.   APPENDIX: Unremarkable  PERITONEUM: No ascites or pneumoperitoneum. RETROPERITONEUM: No lymphadenopathy or aortic aneurysm. Atherosclerotic disease  REPRODUCTIVE ORGANS: Hyperdense lesion adjacent to the right side of the uterus  measuring approximately 2.5 x 2.2 cm. URINARY BLADDER: Distended with contrast  BONES: Mild degenerative changes in the lumbar spine. ABDOMINAL WALL: No mass or hernia. ADDITIONAL COMMENTS: N/A    Impression  1. No acute intra-abdominal pathology. 2.  Small left pleural effusion left lower lobe volume loss. 3.  Focal aneurysmal dilatation of the distal thoracic aorta. 4.  Mildly nodular contour the liver. Question underlying cirrhosis. 5.  Hyperdense lesion right adnexal lesion. This may represent a exophytic  leiomyoma or right ovarian mass. Tele- reviewed    Medical decision making:   I have reviewed the flowsheet and previous day's notes  Patient has acute or chronic illness that poses a threat to life or bodily function  Review and order of Clinical lab tests  Review and Order of Radiology tests  Independent visualization of Image  Reviewed Oxygen  High Risk Drug therapy requiring intensive monitoring for toxicity: eg steroids, pressors, antibiotics        Thank you for allowing me to participate in this patient's care.     Delta Beltre MD      [facility]

## 2022-09-25 NOTE — PROGRESS NOTES
Care Management - Received call from hospitalist. MD asked for evaluation for hospice for patient for possible GIP. Pt with COVID PNA. Family does not want pt to go on vent. Pt currently on bi-pap. If patient is able to wean off bi-pap and be managed by nasal cannula, family would like to take her home. If she cannot be weaned, MD would like hospice to assess for hospice GIP? Sent referral to [address] [place] [address] via CC.      JONATHON Garland

## 2022-09-25 NOTE — PROGRESS NOTES
Na 127  Cont FR  Daily Na checks  Will f/up in AM  Call with any questions        Constantino Wu MD  [facility]   [address] [address]  Phone - [phone]   Fax - [phone]  [email or web address]

## 2022-09-25 NOTE — PROGRESS NOTES
[address] [facility]                                                                                          Hospitalist Progress Note  Cale Blanchard MD  Answering service: [phone] from in house phone        Date of Service:  2022  NAME:  Daiana Andres  :  1928  MRN:  [id number]      Admission Summary:   HPI: Daiana Andres is a 80 y.o. female who presents with shortness of breath. Patient is a poor story and, history was probably obtained from chart review, apparently patient was diagnosed with COVID 4 weeks ago, since last night has had increasing shortness of breath cough and confusion, she has not been eating drinking well, came to the ER and was requested to be admitted to the hospital service, currently confused not much history could be obtained from the patient\"       Interval history / Subjective:   Patient seen examined at bedside ABG this morning shows increased hypercapnia , son and daughter-in-law at bedside spoke with them  agreeable to bipap, but are interested in speaking to hospice      Assessment & Plan:      Acute hypoxic hypercarbic respiratory failure  Recent history of COVID-19  CAP  Severe hyponatremia -likely siadh, improving  Acute metabolic encephalopathy  Thoracic aortic aneurysm  GERD  -Continue antibiotics Rocephin azithro  -Increased hypercapnia on ABG, start BiPAP transfer to IMCU,  - npo on bipap  -As needed antitussives  -rpt abg for tomorrow  -Appreciate pulm  -obtain TTE  - Appreciate nephrology, continue fluid restriction   -cont PPI for gerd   -PTOT       Had extensive discussion with family at bedside and explained that patient high risk to clinically deteriorate. There wish for patient is to be able to return home under comfort, agreeable to hospice evaluation , would like to try bipap to see if she improves reiterated no intubation.   Hospice consulted for eval.      CRITICAL CARE ATTESTATION:  I personally spent 30 minutes of critical care time. This is time spent at this critically ill patient's bedside actively involved in patient care as well as the coordination of care and discussions with the patient's family. Code status: dnr  Prophylaxis: Heparin subcu  Care Plan discussed with: Patient/family, nurse, case management, consultant  Anticipated Disposition: >48 hours tbd      Hospital Problems  Never Reviewed            Codes Class Noted POA    Respiratory failure ([facility] [address] ICD-10-CM: J96.90  ICD-9-CM: 518.81  9/23/2022 Unknown           Review of Systems:   A comprehensive review of systems was negative except for that written in the HPI. Vital Signs:    Last 24hrs VS reviewed since prior progress note. Most recent are:  Visit Vitals  BP (!) 141/63 (BP 1 Location: Right upper arm, BP Patient Position: At rest)   Pulse 77   Temp 97.3 °F (36.3 °C)   Resp 17   Wt 75.9 kg (167 lb 4.8 oz)   SpO2 97%   Breastfeeding No         Intake/Output Summary (Last 24 hours) at 9/25/2022 1343  Last data filed at 9/25/2022 1045  Gross per 24 hour   Intake --   Output 1950 ml   Net -1950 ml          Physical Examination:     I had a face to face encounter with this patient and independently examined them on 9/25/2022 as outlined below:          Constitutional:  No acute distress, cooperative, pleasant, lethargic    ENT:  Oral mucosa moist, oropharynx benign. Resp:  CTA bilaterally. No wheezing/rhonchi/rales. No accessory muscle use. CV:  Regular rhythm, normal rate, no murmurs, gallops, rubs    GI:  Soft, non distended, non tender. normoactive bowel sounds, no hepatosplenomegaly     Musculoskeletal:  No edema, warm, 2+ pulses throughout    Neurologic:  Moves all extremities.   AAOx3, CN II-XII reviewed            Data Review:    Review and/or order of clinical lab test  Review and/or order of tests in the radiology section of CPT  Review and/or order of tests in the medicine section of CPT      Labs: Recent Labs     09/25/22 0415 09/24/22 0219   WBC 5.5 4.7   HGB 12.6 12.4   HCT 41.4 38.8    157       Recent Labs     09/25/22  0415 09/24/22  1638 09/24/22  0943 09/24/22  0219 09/23/22  1952 09/23/22  1205 09/23/22  1204   * 125* 124*   < > 123*   < >  --    K 4.1 3.9 4.1   < > 4.0   < >  --    CL 87* 88* 87*   < > 84*   < >  --    CO2 36* 34* 32   < > 30   < >  --    BUN 18 18 13   < > 8   < >  --    CREA 0.91 0.98 0.80   < > 0.70   < >  --    GLU 94 109* 68   < > 91   < >  --    CA 8.3* 8.3* 8.4*   < > 7.8*   < >  --    MG  --   --   --   --   --   --  1.8   URICA  --   --   --   --  2.8  --   --     < > = values in this interval not displayed. Recent Labs     09/24/22 0219 09/23/22  1205   ALT 23 24   AP 73 82   TBILI 0.5 0.7   TP 6.0* 7.3   ALB 2.9* 3.5   GLOB 3.1 3.8       No results for input(s): INR, PTP, APTT, INREXT, INREXT in the last 72 hours. No results for input(s): FE, TIBC, PSAT, FERR in the last 72 hours. No results found for: FOL, RBCF   No results for input(s): PH, PCO2, PO2 in the last 72 hours. No results for input(s): CPK, CKNDX, TROIQ in the last 72 hours.     No lab exists for component: CPKMB  No results found for: CHOL, CHOLX, CHLST, CHOLV, HDL, HDLP, LDL, LDLC, DLDLP, TGLX, TRIGL, TRIGP, CHHD, CHHDX  No results found for: [facility]  Lab Results   Component Value Date/Time    Color YELLOW/STRAW 09/23/2022 07:52 PM    Appearance CLEAR 09/23/2022 07:52 PM    Specific gravity 1.017 09/23/2022 07:52 PM    pH (UA) 8.5 (H) 09/23/2022 07:52 PM    Protein 30 (A) 09/23/2022 07:52 PM    Glucose Negative 09/23/2022 07:52 PM    Ketone Negative 09/23/2022 07:52 PM    Bilirubin Negative 09/23/2022 07:52 PM    Urobilinogen 0.2 09/23/2022 07:52 PM    Nitrites Negative 09/23/2022 07:52 PM    Leukocyte Esterase Negative 09/23/2022 07:52 PM    Epithelial cells FEW 09/23/2022 07:52 PM    Bacteria Negative 09/23/2022 07:52 PM    WBC 0-4 09/23/2022 07:52 PM    RBC 0-5 09/23/2022 07:52 PM         Medications Reviewed:     Current Facility-Administered Medications   Medication Dose Route Frequency    benzonatate (TESSALON) capsule 100 mg  100 mg Oral TID PRN    guaiFENesin-dextromethorphan (ROBITUSSIN DM) 100-10 mg/5 mL syrup 10 mL  10 mL Oral Q6H PRN    albuterol (PROVENTIL HFA, VENTOLIN HFA, PROAIR HFA) inhaler 2 Puff  2 Puff Inhalation Q4H PRN    levETIRAcetam (KEPPRA) tablet 500 mg  500 mg Oral BID    pantoprazole (PROTONIX) tablet 40 mg  40 mg Oral ACB    guaiFENesin ER (MUCINEX) tablet 600 mg  600 mg Oral Q12H    pantoprazole (PROTONIX) tablet 40 mg  40 mg Oral DAILY    sodium chloride (NS) flush 5-40 mL  5-40 mL IntraVENous Q8H    sodium chloride (NS) flush 5-40 mL  5-40 mL IntraVENous PRN    cefTRIAXone (ROCEPHIN) 1 g in 0.9% sodium chloride 10 mL IV syringe  1 g IntraVENous Q24H    azithromycin (ZITHROMAX) 500 mg in 0.9% sodium chloride 250 mL ([id number])  500 mg IntraVENous Q24H    acetaminophen (TYLENOL) tablet 650 mg  650 mg Oral Q4H PRN    heparin (porcine) injection 5,000 Units  5,000 Units SubCUTAneous Q8H    hydrALAZINE (APRESOLINE) 20 mg/mL injection 10 mg  10 mg IntraVENous Q6H PRN    alum-mag hydroxide-simeth (MYLANTA) oral suspension 30 mL  30 mL Oral Q4H PRN     ______________________________________________________________________  EXPECTED LENGTH OF STAY: - - -  ACTUAL LENGTH OF STAY:          2                 Cherelle Sun MD (3) slightly limited

## 2022-09-25 NOTE — PROGRESS NOTES
TRANSFER - OUT REPORT:    Verbal report given to GEMMA Tarango (name) on Maximino Wu  being transferred to 4W(unit) for change in patient condition(requires continuous Bipap)       Report consisted of patients Situation, Background, Assessment and   Recommendations(SBAR). Information from the following report(s) SBAR, Kardex, ED Summary, MAR, Recent Results, and Cardiac Rhythm NSR  was reviewed with the receiving nurse. Opportunity for questions and clarification was provided.       Patient transported with:   Registered Nurse & Respiratory

## 2022-09-26 ENCOUNTER — HOSPICE ADMISSION (OUTPATIENT)
Dept: HOSPICE | Facility: HOSPICE | Age: 87
End: 2022-09-26

## 2022-09-26 ENCOUNTER — HOSPITAL ENCOUNTER (INPATIENT)
Age: 87
LOS: 1 days | Discharge: HOSPICE | DRG: 951 | End: 2022-09-27
Attending: INTERNAL MEDICINE | Admitting: INTERNAL MEDICINE
Payer: OTHER MISCELLANEOUS

## 2022-09-26 VITALS
TEMPERATURE: 97.8 F | SYSTOLIC BLOOD PRESSURE: 139 MMHG | HEART RATE: 70 BPM | DIASTOLIC BLOOD PRESSURE: 56 MMHG | OXYGEN SATURATION: 100 % | WEIGHT: 167.33 LBS | RESPIRATION RATE: 19 BRPM

## 2022-09-26 PROBLEM — J96.01 ACUTE RESPIRATORY FAILURE WITH HYPOXIA (HCC): Status: ACTIVE | Noted: 2022-09-26

## 2022-09-26 LAB
ANION GAP SERPL CALC-SCNC: 6 MMOL/L (ref 5–15)
ARTERIAL PATENCY WRIST A: ABNORMAL
BASE EXCESS BLD CALC-SCNC: 6.2 MMOL/L
BASOPHILS # BLD: 0 K/UL (ref 0–0.1)
BASOPHILS NFR BLD: 0 % (ref 0–1)
BDY SITE: ABNORMAL
BUN SERPL-MCNC: 19 MG/DL (ref 6–20)
BUN/CREAT SERPL: 21 (ref 12–20)
CALCIUM SERPL-MCNC: 8.4 MG/DL (ref 8.5–10.1)
CHLORIDE SERPL-SCNC: 92 MMOL/L (ref 97–108)
CO2 SERPL-SCNC: 29 MMOL/L (ref 21–32)
CREAT SERPL-MCNC: 0.92 MG/DL (ref 0.55–1.02)
DIFFERENTIAL METHOD BLD: ABNORMAL
ECHO AV PEAK GRADIENT: 10 MMHG
ECHO AV PEAK VELOCITY: 1.6 M/S
ECHO AV VELOCITY RATIO: 0.94
ECHO EST RA PRESSURE: 3 MMHG
ECHO LA DIAMETER: 3.2 CM
ECHO LV E' LATERAL VELOCITY: 5 CM/S
ECHO LV E' SEPTAL VELOCITY: 3 CM/S
ECHO LV FRACTIONAL SHORTENING: 35 % (ref 28–44)
ECHO LV INTERNAL DIMENSION DIASTOLIC: 3.1 CM (ref 3.9–5.3)
ECHO LV INTERNAL DIMENSION SYSTOLIC: 2 CM
ECHO LV IVSD: 0.9 CM (ref 0.6–0.9)
ECHO LV MASS 2D: 73.7 G (ref 67–162)
ECHO LV POSTERIOR WALL DIASTOLIC: 0.9 CM (ref 0.6–0.9)
ECHO LV RELATIVE WALL THICKNESS RATIO: 0.58
ECHO LVOT PEAK GRADIENT: 9 MMHG
ECHO LVOT PEAK VELOCITY: 1.5 M/S
ECHO MV A VELOCITY: 1.16 M/S
ECHO MV E VELOCITY: 0.73 M/S
ECHO MV E/A RATIO: 0.63
ECHO MV E/E' LATERAL: 14.6
ECHO MV E/E' RATIO (AVERAGED): 19.47
ECHO MV E/E' SEPTAL: 24.33
ECHO RIGHT VENTRICULAR SYSTOLIC PRESSURE (RVSP): 56 MMHG
ECHO RV TAPSE: 2.1 CM (ref 1.7–?)
ECHO TV REGURGITANT MAX VELOCITY: 3.64 M/S
ECHO TV REGURGITANT PEAK GRADIENT: 53 MMHG
EOSINOPHIL # BLD: 0 K/UL (ref 0–0.4)
EOSINOPHIL NFR BLD: 0 % (ref 0–7)
ERYTHROCYTE [DISTWIDTH] IN BLOOD BY AUTOMATED COUNT: 15.8 % (ref 11.5–14.5)
GAS FLOW.O2 O2 DELIVERY SYS: ABNORMAL L/MIN
GAS FLOW.O2 SETTING OXYMISER: 20 BPM
GLUCOSE SERPL-MCNC: 73 MG/DL (ref 65–100)
HCO3 BLD-SCNC: 37.5 MMOL/L (ref 22–26)
HCT VFR BLD AUTO: 41.6 % (ref 35–47)
HGB BLD-MCNC: 12.5 G/DL (ref 11.5–16)
IMM GRANULOCYTES # BLD AUTO: 0.1 K/UL (ref 0–0.04)
IMM GRANULOCYTES NFR BLD AUTO: 1 % (ref 0–0.5)
LYMPHOCYTES # BLD: 1 K/UL (ref 0.8–3.5)
LYMPHOCYTES NFR BLD: 21 % (ref 12–49)
MCH RBC QN AUTO: 26.5 PG (ref 26–34)
MCHC RBC AUTO-ENTMCNC: 30 G/DL (ref 30–36.5)
MCV RBC AUTO: 88.3 FL (ref 80–99)
MONOCYTES # BLD: 0.8 K/UL (ref 0–1)
MONOCYTES NFR BLD: 17 % (ref 5–13)
NEUTS SEG # BLD: 2.8 K/UL (ref 1.8–8)
NEUTS SEG NFR BLD: 60 % (ref 32–75)
NRBC # BLD: 0 K/UL (ref 0–0.01)
NRBC BLD-RTO: 0 PER 100 WBC
O2/TOTAL GAS SETTING VFR VENT: 40 %
PCO2 BLD: 92 MMHG (ref 35–45)
PEEP RESPIRATORY: 10 CMH2O
PH BLD: 7.22 [PH] (ref 7.35–7.45)
PLATELET # BLD AUTO: 133 K/UL (ref 150–400)
PMV BLD AUTO: 9.9 FL (ref 8.9–12.9)
PO2 BLD: 74 MMHG (ref 80–100)
POTASSIUM SERPL-SCNC: 4.6 MMOL/L (ref 3.5–5.1)
PRESSURE SUPPORT SETTING VENT: 10 CMH2O
RBC # BLD AUTO: 4.71 M/UL (ref 3.8–5.2)
SAO2 % BLD: 89.7 % (ref 92–97)
SERVICE CMNT-IMP: ABNORMAL
SODIUM SERPL-SCNC: 127 MMOL/L (ref 136–145)
SPECIMEN TYPE: ABNORMAL
VENTILATION MODE VENT: ABNORMAL
VT SETTING VENT: 260 ML
WBC # BLD AUTO: 4.7 K/UL (ref 3.6–11)

## 2022-09-26 PROCEDURE — C9113 INJ PANTOPRAZOLE SODIUM, VIA: HCPCS | Performed by: STUDENT IN AN ORGANIZED HEALTH CARE EDUCATION/TRAINING PROGRAM

## 2022-09-26 PROCEDURE — 82803 BLOOD GASES ANY COMBINATION: CPT

## 2022-09-26 PROCEDURE — 36415 COLL VENOUS BLD VENIPUNCTURE: CPT

## 2022-09-26 PROCEDURE — HOSPICE MEDICATION HC HH HOSPICE MEDICATION

## 2022-09-26 PROCEDURE — 0656 HSPC GENERAL INPATIENT

## 2022-09-26 PROCEDURE — 65270000046 HC RM TELEMETRY

## 2022-09-26 PROCEDURE — 85025 COMPLETE CBC W/AUTO DIFF WBC: CPT

## 2022-09-26 PROCEDURE — 74011250636 HC RX REV CODE- 250/636: Performed by: STUDENT IN AN ORGANIZED HEALTH CARE EDUCATION/TRAINING PROGRAM

## 2022-09-26 PROCEDURE — 93306 TTE W/DOPPLER COMPLETE: CPT | Performed by: INTERNAL MEDICINE

## 2022-09-26 PROCEDURE — 74011250636 HC RX REV CODE- 250/636: Performed by: FAMILY MEDICINE

## 2022-09-26 PROCEDURE — 74011250636 HC RX REV CODE- 250/636: Performed by: NURSE PRACTITIONER

## 2022-09-26 PROCEDURE — 80048 BASIC METABOLIC PNL TOTAL CA: CPT

## 2022-09-26 PROCEDURE — 94660 CPAP INITIATION&MGMT: CPT

## 2022-09-26 PROCEDURE — 74011250637 HC RX REV CODE- 250/637: Performed by: INTERNAL MEDICINE

## 2022-09-26 PROCEDURE — 36600 WITHDRAWAL OF ARTERIAL BLOOD: CPT

## 2022-09-26 PROCEDURE — 74011000250 HC RX REV CODE- 250: Performed by: STUDENT IN AN ORGANIZED HEALTH CARE EDUCATION/TRAINING PROGRAM

## 2022-09-26 RX ORDER — MIDAZOLAM HYDROCHLORIDE 1 MG/ML
1 INJECTION, SOLUTION INTRAMUSCULAR; INTRAVENOUS
Status: DISCONTINUED | OUTPATIENT
Start: 2022-09-26 | End: 2022-09-26 | Stop reason: HOSPADM

## 2022-09-26 RX ORDER — MORPHINE SULFATE 2 MG/ML
1 INJECTION, SOLUTION INTRAMUSCULAR; INTRAVENOUS
Status: DISCONTINUED | OUTPATIENT
Start: 2022-09-26 | End: 2022-09-27 | Stop reason: HOSPADM

## 2022-09-26 RX ORDER — MORPHINE SULFATE 2 MG/ML
2 INJECTION, SOLUTION INTRAMUSCULAR; INTRAVENOUS EVERY 4 HOURS
Status: DISCONTINUED | OUTPATIENT
Start: 2022-09-26 | End: 2022-09-26

## 2022-09-26 RX ORDER — KETOROLAC TROMETHAMINE 30 MG/ML
30 INJECTION, SOLUTION INTRAMUSCULAR; INTRAVENOUS
Status: DISCONTINUED | OUTPATIENT
Start: 2022-09-26 | End: 2022-09-27 | Stop reason: HOSPADM

## 2022-09-26 RX ORDER — DIAZEPAM 10 MG/2ML
10 INJECTION INTRAMUSCULAR EVERY 6 HOURS
Status: DISCONTINUED | OUTPATIENT
Start: 2022-09-26 | End: 2022-09-26

## 2022-09-26 RX ORDER — MORPHINE SULFATE 2 MG/ML
1 INJECTION, SOLUTION INTRAMUSCULAR; INTRAVENOUS EVERY 4 HOURS
Status: DISCONTINUED | OUTPATIENT
Start: 2022-09-26 | End: 2022-09-27 | Stop reason: HOSPADM

## 2022-09-26 RX ORDER — MORPHINE SULFATE 2 MG/ML
1 INJECTION, SOLUTION INTRAMUSCULAR; INTRAVENOUS
Status: DISCONTINUED | OUTPATIENT
Start: 2022-09-26 | End: 2022-09-26 | Stop reason: HOSPADM

## 2022-09-26 RX ORDER — MORPHINE SULFATE 2 MG/ML
2 INJECTION, SOLUTION INTRAMUSCULAR; INTRAVENOUS
Status: DISCONTINUED | OUTPATIENT
Start: 2022-09-26 | End: 2022-09-26

## 2022-09-26 RX ORDER — DIAZEPAM 10 MG/2ML
5 INJECTION INTRAMUSCULAR
Status: DISCONTINUED | OUTPATIENT
Start: 2022-09-26 | End: 2022-09-27 | Stop reason: HOSPADM

## 2022-09-26 RX ORDER — FACIAL-BODY WIPES
10 EACH TOPICAL DAILY PRN
Status: DISCONTINUED | OUTPATIENT
Start: 2022-09-26 | End: 2022-09-27 | Stop reason: HOSPADM

## 2022-09-26 RX ORDER — MIDAZOLAM HYDROCHLORIDE 1 MG/ML
2 INJECTION, SOLUTION INTRAMUSCULAR; INTRAVENOUS
Status: DISCONTINUED | OUTPATIENT
Start: 2022-09-26 | End: 2022-09-26

## 2022-09-26 RX ORDER — GLYCOPYRROLATE 0.2 MG/ML
0.2 INJECTION INTRAMUSCULAR; INTRAVENOUS EVERY 4 HOURS
Status: DISCONTINUED | OUTPATIENT
Start: 2022-09-26 | End: 2022-09-26

## 2022-09-26 RX ADMIN — MORPHINE SULFATE 1 MG: 2 INJECTION, SOLUTION INTRAMUSCULAR; INTRAVENOUS at 20:00

## 2022-09-26 RX ADMIN — MORPHINE SULFATE 1 MG: 2 INJECTION, SOLUTION INTRAMUSCULAR; INTRAVENOUS at 15:14

## 2022-09-26 RX ADMIN — LEVETIRACETAM 500 MG: 100 INJECTION, SOLUTION INTRAVENOUS at 11:26

## 2022-09-26 RX ADMIN — PANTOPRAZOLE SODIUM 40 MG: 40 INJECTION, POWDER, FOR SOLUTION INTRAVENOUS at 08:43

## 2022-09-26 RX ADMIN — HEPARIN SODIUM 5000 UNITS: 5000 INJECTION INTRAVENOUS; SUBCUTANEOUS at 11:26

## 2022-09-26 RX ADMIN — HEPARIN SODIUM 5000 UNITS: 5000 INJECTION INTRAVENOUS; SUBCUTANEOUS at 03:43

## 2022-09-26 RX ADMIN — BISACODYL 10 MG: 10 SUPPOSITORY RECTAL at 17:35

## 2022-09-26 NOTE — PROGRESS NOTES
Bedside shift change report given to Leann Cornelius (oncoming nurse) by Sherlynn Klinefelter (offgoing nurse). Report included the following information SBAR, ED Summary, Procedure Summary, Intake/Output, MAR, Recent Results, Med Rec Status, and Cardiac Rhythm NS .

## 2022-09-26 NOTE — PROGRESS NOTES
Problem: Airway Clearance - Ineffective  Goal: Achieve or maintain patent airway  Outcome: Resolved/Not Met     Problem: Gas Exchange - Impaired  Goal: Absence of hypoxia  Outcome: Resolved/Not Met  Goal: Promote optimal lung function  Outcome: Resolved/Not Met     Problem: Airway Clearance - Ineffective  Goal: Achieve or maintain patent airway  Outcome: Resolved/Not Met     Problem: Gas Exchange - Impaired  Goal: Promote optimal lung function  Outcome: Resolved/Not Met     Problem: Breathing Pattern - Ineffective  Goal: Ability to achieve and maintain a regular respiratory rate  Outcome: Resolved/Not Met     Problem: Body Temperature -  Risk of, Imbalanced  Goal: Ability to maintain a body temperature within defined limits  Outcome: Resolved/Not Met     Problem:  Body Temperature -  Risk of, Imbalanced  Goal: Complications related to the disease process, condition or treatment will be avoided or minimized  Outcome: Resolved/Not Met     Problem: Isolation Precautions - Risk of Spread of Infection  Goal: Prevent transmission of infectious organism to others  Outcome: Resolved/Not Met     Problem: Nutrition Deficits  Goal: Optimize nutrtional status  Outcome: Resolved/Not Met     Problem: Risk for Fluid Volume Deficit  Goal: Maintain normal heart rhythm  Outcome: Resolved/Not Met     Problem: Risk for Fluid Volume Deficit  Goal: Maintain absence of muscle cramping  Outcome: Resolved/Not Met     Problem: Risk for Fluid Volume Deficit  Goal: Maintain normal serum potassium, sodium, calcium, phosphorus, and pH  Outcome: Resolved/Not Met     Problem: Loneliness or Risk for Loneliness  Goal: Demonstrate positive use of time alone when socialization is not possible  Outcome: Resolved/Not Met     Problem: Fatigue  Goal: Verbalize increase energy and improved vitality  Outcome: Resolved/Not Met     Problem: Patient Education: Go to Patient Education Activity  Goal: Patient/Family Education  Outcome: Resolved/Not Met Problem: Risk for Spread of Infection  Goal: Prevent transmission of infectious organism to others  Description: Prevent the transmission of infectious organisms to other patients, staff members, and visitors.   Outcome: Resolved/Not Met     Problem: Patient Education:  Go to Education Activity  Goal: Patient/Family Education  Outcome: Resolved/Not Met

## 2022-09-26 NOTE — PROGRESS NOTES
Jim Mathew about patients worsening abg results was intracted to call a rapid respsonse. Rapid response team responded as well as Dr. Bridger Anthony. Respiratory went to evaluate Bipap settings. Hospice consult in. Will continue to monitor    Rapid response cancelled per Dr. Bridger Anthony    Pt has not produced any urine during the shift. Nurse is unable to locate a bladder scanner. Will continue to monitor and pass information to day shift nurse. Bedside and Verbal shift change report given to Lindy Manuel (oncoming nurse) by Dalton Mulligan RN (offgoing nurse). Report included the following information SBAR, Kardex, ED Summary, Intake/Output, MAR, Recent Results, and Cardiac Rhythm NS .

## 2022-09-26 NOTE — PROGRESS NOTES
SLP Contact Note    Family pursuing hospice. Allow patient food/drink as patient requests but do not force feed. No further SLP needs.       Thank you,  HOSSEIN Earl.Ed, [address] [facility]  Speech-Language Pathologist

## 2022-09-26 NOTE — HOSPICE
[facility]  [facility]  [phone]     Patient Name: Caterina Hernandez  YOB: 1928  Age: 80 y.o. [facility] RN Note:  Hospice consult received, reviewing chart. Will follow up with Unit Nurse and Care Manager to discuss plan of care, patient status and discharge disposition within the hour. Thank you for the opportunity to be of service to this patient.      Nieves Gay RN  Clinical Nurse Liaison   [facility]  (R)[phone] (M) [phone]

## 2022-09-26 NOTE — PROGRESS NOTES
Transitions of care:  Pending hospice referral.    Received handoff from ED CM that referral has been sent to [facility]. Noted that [facility] plans to do an evaluation at bedside this AM.    Per handoff, if pt is able to be weaned off respiratory support (bipap), family would like to take home, if not plan would be for GIP hospice evaluation/admission.     Lucia Bronson 52   [phone]

## 2022-09-26 NOTE — HOSPICE
[facility] Help to Those in Need  [phone]    Hospice Nursing  Discharge Summary  Patient Name: Rudi Estrada  YOB: 1928  Age: 80 y.o. Date of PLANNED Hospice Home [facility] in  visit: 2022  Hospice Attending: Dr Fuad Jane  Primary Care Physician: Jorge Peterson, [address] [facility]. [place] [address]    Primary Contact and Phone: [phone]      ADVANCE CARE PLANNING    Code Status: DNR  Durable DNR: [x]  Yes  []  No  Advance Care Planning 2022   Confirm Advance Directive Yes, not on file       [employer]: [employer]   Home: TBD    HOSPICE SUMMARY     Verbal CTI of terminal diagnosis with life expectancy of 6 months or less received from: Dr Gregor Abel    For the Hospice Diagnosis of: Interstitial Lung Disease/ Covid/ pleural effusions    NCD: Requested      CLINICAL INFORMATION   Allergies: No Known Allergies      Currently this patient has:  [x] Supplemental O2           COVID Screening: Positive         ASSESSMENT & PLAN     1. Symptom Issues Identified: dyspnea, weakness, pain, dysphagia    2. Spiritual Issues  Identified: open to hospice chaplain    3. Psych/ Social/ Emotional Issues Identified: lives with son and DIL. Moved her from [facility] in December. Travel insurance is inactive and she has been accepted as jane. CARE COORDINATION           Hospice Consents: signed for [facility] admission     2. DME Ordered/Company/Delivery Plan: [employer] to delivery  pm: hospital bed, gel overlay, OBT, BSC, oxygen       3. Unique home needs for safety: Bariatric patient  NO    4. Symptom Kit and other Medications Needs: SRX and mucinex to be filled as [facility] at [address] [place] [address] to go home with family today    5. Home [facility]  Reservation:  11am    6. Transportation by: [facility]    Scheduled for 10 AM          7. Verbal Report/Handoff given to: Per this email      8.  Phone number to [facility] [phone]    7.  Supplies/Wound Care: chux, diapers, barrier cream    Lyndsay Concepcion RN  Clinical Nurse Liaison   [facility]  (G)[phone] (Z) [phone]

## 2022-09-26 NOTE — PROGRESS NOTES
SLP Contact Note    Noted pt with worsening ABGs. Pt on BiPAP and NPO. Will continue to follow.       Thank you,  HOSSEIN Orta.Ed, [address] [facility]  Speech-Language Pathologist

## 2022-09-26 NOTE — DISCHARGE SUMMARY
Discharge Summary       PATIENT ID: Ovidio Bagley  MRN: [id number]   YOB: 1928    DATE OF ADMISSION: 9/26/2022 12:55 PM    DATE OF DISCHARGE: 09/26/22    PRIMARY CARE PROVIDER: Chicho Skelton MD     ATTENDING PHYSICIAN: Jerald Vo MD   DISCHARGING PROVIDER: Jerald Vo MD    To contact this individual call [phone] and ask the  to page. If unavailable ask to be transferred the Adult Hospitalist Department. CONSULTATIONS: None    PROCEDURES/SURGERIES: * No surgery found *    ADMITTING DIAGNOSES & HOSPITAL COURSE:   HPI: Ovidio Bagley is a 80 y.o. female who presents with shortness of breath. Patient is a poor story and, history was probably obtained from chart review, apparently patient was diagnosed with COVID 4 weeks ago, since last night has had increasing shortness of breath cough and confusion, she has not been eating drinking well, came to the ER and was requested to be admitted to the hospital service, currently confused not much history could be obtained from the patient\"            [address] / PLAN:      Acute hypoxic hypercarbic respiratory failure  Recent history of COVID-19  CAP  Severe hyponatremia -likely siadh, improving  Acute metabolic encephalopathy  Thoracic aortic aneurysm  GERD  -on  antibiotics Rocephin azithro  -Increased hypercapnia on ABG, start BiPAP transfer to IMCU,  - npo on bipap  -As needed antitussives  -abg shows worsening respiratory acidosis  -Appreciate pulm  -obtain TTE  - Appreciate nephrology, continue fluid restriction   -cont PPI for gerd   -PTOT      Admit to hospice, family agreeable      Had extensive discussion with family and hospice team today, son and daughter in law agreeable to hospice, comfort care, their ultimate goal would be to take patient home but clinically respiratory status may worsen, they understand this. Discussed with hospice team, they will admit under hospice services.  Plan for other family to see patient before removing bipap. No more lab draws or abgs Hospice aware . CRITICAL CARE ATTESTATION:  I personally spent 30 minutes of critical care time. This is time spent at this critically ill patient's bedside actively involved in patient care as well as the coordination of care and discussions with the patient's family. FOLLOW UP APPOINTMENTS:    Follow-up Information    None          ADDITIONAL CARE RECOMMENDATIONS: comfort care     DIET: Resume previous diet    ACTIVITY: Activity as tolerated        DISCHARGE MEDICATIONS:  Current Discharge Medication List            NOTIFY YOUR PHYSICIAN FOR ANY OF THE FOLLOWING:   Fever over 101 degrees for 24 hours. Chest pain, shortness of breath, fever, chills, nausea, vomiting, diarrhea, change in mentation, falling, weakness, bleeding. Severe pain or pain not relieved by medications. Or, any other signs or symptoms that you may have questions about. DISPOSITION:    Home With:   OT  PT  HH  RN       Long term SNF/Inpatient Rehab    Independent/assisted living   x Hospice    Other:       PATIENT CONDITION AT DISCHARGE:     Functional status   x Poor     Deconditioned     Independent      Cognition     Lucid    x Forgetful     Dementia        Code status     Full code    x DNR      PHYSICAL EXAMINATION AT DISCHARGE:  I had a face to face encounter with this patient and independently examined them on 9/26/2022 as outlined below:                                                       Constitutional:  No acute distress, cooperative, pleasant, lethargic    ENT:  Oral mucosa moist, oropharynx benign. Resp:  coarse breath sounds b/l. No wheezing/rhonchi/rales. No accessory muscle use. CV:  Regular rhythm, normal rate, no murmurs, gallops, rubs    GI:  Soft, non distended, non tender. normoactive bowel sounds, no hepatosplenomegaly     Musculoskeletal:  No edema, warm, 2+ pulses throughout    Neurologic:  Moves all extremities. AAOx3, CN II-XII reviewed         CHRONIC MEDICAL DIAGNOSES:  Problem List as of 9/26/2022 Never Reviewed            Codes Class Noted - Resolved    Respiratory failure [facility]) ICD-10-CM: J96.90  ICD-9-CM: 128.04  9/23/2022 - Present           Greater than 30 minutes were spent with the patient on counseling and coordination of care    Signed:   Den Thibodeaux MD  9/26/2022  1:07 PM

## 2022-09-26 NOTE — PROGRESS NOTES
Noted plans for Hospice  Signing off   Thank you for allowing us to participate in your pts care       [facility]  Office :[phone]  Fax: [phone]

## 2022-09-26 NOTE — H&P
[facility] Those in [facility]  [phone]    Patient Name: Josué Eldridge  YOB: 1928    Date of Provider Hospice Visit: 09/26/22    Level of Care:   [x] General Inpatient (GIP)    [] Routine   [] Respite    Current Location of Care:  [x] [facility] [] [facility] [] [address] [] [facility] [place] [] [facility]ice [facility], patient referred from:  [] [facility] [] [facility] [] [address] [] [facility] [place] [] Home [] Other:     Date of Original Hospice Admission: 9/26/2022  Hospice Medical Director at time of admission: Dr. Savita Núñez Diagnosis: Acute respiratory failure with hypoxia  Diagnoses RELATED to the terminal prognosis: COVID, pleural effusions, hyponatremia  Other Diagnoses: Interstitial lung disease     HOSPICE SUMMARY     Josué Eldridge is a 80y.o. year old female who was admitted to [address] [place] [address]. HPI and course of hospitalization as summarized in Dr. Jesus Ortiz discharge note:  [address] COURSE:   HPI: Josué Eldridge is a 80 y.o. female who presents with shortness of breath.       Patient is a poor story and, history was probably obtained from chart review, apparently patient was diagnosed with COVID 4 weeks ago, since last night has had increasing shortness of breath cough and confusion, she has not been eating drinking well, came to the ER and was requested to be admitted to the hospital service, currently confused not much history could be obtained from the patient\"      [address] / PLAN:       Acute hypoxic hypercarbic respiratory failure  Recent history of COVID-19  CAP  Severe hyponatremia -likely siadh, improving  Acute metabolic encephalopathy  Thoracic aortic aneurysm  GERD  -on  antibiotics Rocephin azithro  -Increased hypercapnia on ABG, start BiPAP transfer to IMCU,  - npo on bipap  -As needed antitussives  -abg shows worsening respiratory acidosis  -Appreciate pulm  -obtain TTE  - Appreciate nephrology, continue fluid restriction   -cont PPI for gerd   -PTOT      Admit to hospice, family agreeable      Had extensive discussion with family and hospice team today, son and daughter in law agreeable to hospice, comfort care, their ultimate goal would be to take patient home but clinically respiratory status may worsen, they understand this. Discussed with hospice team, they will admit under hospice services. Plan for other family to see patient before removing bipap. No more lab draws or abgs Hospice aware . The patient's principle diagnosis has resulted in increased work of breathing and anxiety. Functionally, the patient's Karnofsky and/or Palliative Performance Scale has declined over a period of days and is estimated at 20. The patient is dependent on the following ADLs: all    Objective information that support this patients limited prognosis includes:  Contains critical data POC G3 - PUL  Order: [id number]  Collected 9/26/2022  2:36 AM    Status: Final result    Next appt: None    0 Result Notes  Component Ref Range & Units 9/26/22  2:36 AM 9/25/22 10:26 AM 9/23/22  9:33 PM   FIO2 (POC) % 40   2    pH (POC) 7.35 - 7.45   7.22 Low Panic   7.21 Low Panic   7.30 Low     pCO2 (POC) 35.0 - 45.0 MMHG 92.0 High   88.2 High   64.1 High     pO2 (POC) 80 - 100 MMHG 74 Low   96  73 Low     HCO3 (POC) 22 - 26 MMOL/L 37.5 High   34.9 High   31.7 High     sO2 (POC) 92 - 97 % 89.7 Low   94.9  92.0    Base excess (POC) mmol/L 6.2  3.6  3.3    Site   RIGHT RADIAL  RIGHT RADIAL  RIGHT BRACHIAL    Device:   BIPAP MASK  NASAL CANNULA  NASAL CANNULA    Mode   Non Invasive      Tidal volume ml 260      Set Rate bpm 20      PEEP/CPAP (POC) cmH2O 10      Pressure support cmH2O 10      Allens test (POC)   NOT APPLICABLE  Positive  NOT APPLICABLE    Specimen type (POC)   ARTERIAL  ARTERIAL  ARTERIAL    Critical value read back  JAZMYN URIBE.  GEMMA Antoine RN         Study Result    Narrative & Impression   EXAM:  CTA CHEST W OR W WO CONT     INDICATION: Elevated d-dimer and shortness of breath with hypoxia     COMPARISON: No comparisons     TECHNIQUE: Helical thin section chest CT following uneventful intravenous  administration of nonionic contrast 100 mL of isovue 370 according to  departmental PE protocol. Coronal and sagittal reformats were performed. 3D post  processing was performed. CT dose reduction was achieved through the use of a  standardized protocol tailored for this examination and automatic exposure  control for dose modulation. FINDINGS: This is a significantly limited quality study for the evaluation of  pulmonary embolism to the proximal segmental arterial level. Evaluation is  limited by motion. No large pulmonary embolus is identified. Borderline size left axillary lymph node. Nodular density left breast measuring  9 mm with ill-defined breast tissue which appears to be asymmetric although the  right breast was incompletely imaged. THYROID: No nodule. MEDIASTINUM: No mass or lymphadenopathy. PHYLICIA: No mass or lymphadenopathy. 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 TRACHEA/BRONCHI: Patent. PLEURA: Small left pleural effusion  LUNGS: Left lower lobe volume loss. Evaluation of the lungs limited by motion. UPPER ABDOMEN: Partially imaged. No acute pathology. BONES: No aggressive bone lesion or fracture. IMPRESSION     1. Extensive limitation secondary to motion. No large pulmonary embolus is  identified. 2.  Small left pleural effusion left lower lobe volume loss. 3.  Focal aneurysmal dilatation of the distal thoracic aorta. 4.  Borderline size asymmetric left axillary lymph nodes with possible  asymmetric soft left breast soft tissue in the small 9 mm nodule in the left  breast. Consider correlation with dedicated breast imaging.       The patient/family chose comfort measures with the support of Hospice. HOSPICE DIAGNOSES   Active Symptoms:  1. Dyspnea  2. Anxiety  3. Debility/weakness  4. Hospice care     PLAN   Admit to GIP level of care as pt is very high risk for rapid decline once Bipap removed; requires frequent nursing assessment and administration and titration of parenteral medications to manage symptom burden; once family visits and is agreeable will medicate and transition to oxygen via NC  Morphine 1 mg IV every 4 hours with prn dosing available every 15 minutes for breakthrough pain or air hunger  Valium 5 mg IV every 6 hours as needed for anxiety  All other symptom management medications as needed  Daughter-in-law at bedside; reviewed hospice philosophy and goals of care with emphasis on comfort and safety; reviewed symptom management medications and indications and rationale for use; plan is to transition oxygen via NC once all family members have had a chance to visit with her as she is high risk for rapid decompensation   and SW to support family needs  Disposition: high risk for rapid decompensation but if seemingly stable in am will work to coordinate transition to routine level of care at home per pt and family wishes  Hospice Plan of care was reviewed in detail with hospice physician, and agree with current plan of care    Prognosis estimated based on 09/26/22 clinical assessment is:   [x] Hours to Days    [] Days to Weeks    [] Other:    Communicated plan of care with: Hospice Case Manager; Hospice IDT; Care Team     GOALS OF CARE     Patient/Medical POA stated Goal of Care: Comfort care and symptom management    [x] I have reviewed and/or updated ACP information in the Advance Care Planning Navigator. This information is available in the [facility] [address] link in the patient's chart header.     Primary Decision Maker ([address]):     Resuscitation Status: DNR  If DNR is there a Durable DNR on file? : [x] Yes [] No (If no, complete Durable DNR)    HISTORY History obtained from: chart review; discussion with liaison, attending MD, family and patient    CHIEF COMPLAINT: SOB and dry mouth  The patient is:   [x] Verbal  [] Nonverbal  [] Unresponsive    HPI/SUBJECTIVE:  Daughter-in-Law at bedside. Pt observed resting in bed. She is awake and alert. Able to follow commands, make needs known, and provide brief responses to verbal inquiries though difficult to understand at times due to presence of Bipap. Continues to endorse SOB and dry mouth, but denies pain and nausea.       REVIEW OF SYSTEMS     The following systems were: [x] reviewed  [] unable to be reviewed    Positive ROS include:  Constitutional: fatigue, weakness, in pain, short of breath  Ears/nose/mouth/throat: increased airway secretions; dry mouth  Respiratory:shortness of breath, wheezing  Gastrointestinal:poor appetite, nausea, vomiting, abdominal pain, constipation, diarrhea  Musculoskeletal:pain, deformities, swelling legs  Neurologic:confusion, hallucinations, weakness  Psychiatric:anxiety, feeling depressed, poor sleep  Endocrine:     Adult Non-Verbal Pain Assessment Score: n/a; denies pain    Face  [] 0   No particular expression or smile  [] 1   Occasional grimace, tearing, frowning, wrinkled forehead  [] 2   Frequent grimace, tearing, frowning, wrinkled forehead    Activity (movement)  [] 0   Lying quietly, normal position  [] 1   Seeking attention through movement or slow, cautious movement  [] 2   Restless, excessive activity and/or withdrawal reflexes    Guarding  [] 0   Lying quietly, no positioning of hands over areas of body  [] 1   Splinting areas of the body, tense  [] 2   Rigid, stiff    Physiology (vital signs)  [] 0   Stable vital signs  [] 1   Change in any of the following: SBP > 20mm Hg; HR > 20/minute  [] 2   Change in any of the following: SBP > 30mm Hg; HR > 25/minute    Respiratory  [] 0   Baseline RR/SpO2, compliant with ventilator  [] 1   RR > 10 above baseline, or 5% drop SpO2, mild asynchrony with ventilator  [] 2   RR > 20 above baseline, or 10% drop SpO2, asynchrony with ventilator     FUNCTIONAL ASSESSMENT     Palliative Performance Scale (PPS): 20       PSYCHOSOCIAL/SPIRITUAL ASSESSMENT     Active Problems:    Acute respiratory failure with hypoxia ([facility] [address] (9/26/2022)      Past Medical History:   Diagnosis Date    Hypertension     Seizure ([facility] [address]       No past surgical history on file. Social History     Tobacco Use    Smoking status: Never    Smokeless tobacco: Never   Substance Use Topics    Alcohol use: Not on file     No family history on file.    No Known Allergies   Current Facility-Administered Medications   Medication Dose Route Frequency    ketorolac (TORADOL) injection 30 mg  30 mg IntraVENous Q8H PRN    bisacodyL (DULCOLAX) suppository 10 mg  10 mg Rectal DAILY PRN    morphine injection 1 mg  1 mg IntraVENous Q4H    morphine injection 1 mg  1 mg IntraVENous Q15MIN PRN    diazePAM (VALIUM) injection 5 mg  5 mg IntraVENous Q6H PRN        PHYSICAL EXAM     Wt Readings from Last 3 Encounters:   09/25/22 75.9 kg (167 lb 5.3 oz)   07/06/22 75.2 kg (165 lb 12.6 oz)       Visit Vitals  BP (!) 132/47 (BP 1 Location: Left upper arm, BP Patient Position: At rest;Semi fowlers)   Pulse 78   Temp 98.3 °F (36.8 °C)   Resp 17   SpO2 100%       Supplemental O2  [x] Yes BiPap  [] NO  Last bowel movement: unknown    Currently this patient has:  [x] Peripheral IV [] PICC  [] PORT [] ICD    [] Muller Catheter [] NG Tube   [] PEG Tube    [] Rectal Tube [] Drain  [] Other:     Constitutional: awake and alert; ill-appearing  Eyes: anicteric; no injection  ENMT: dry oral mucosa  Cardiovascular: sinus rhythm on monitor; heart sounds distant; distal pulses intact  Respiratory: increased work of breathing; breath sounds coarse  Gastrointestinal: normoactive bowel sounds in all quadrants; soft/nontender  : Purewick system intact  Musculoskeletal: no contractures of gross deformities  Skin: warm/dry  Neurologic: awake and alert; able to follow commands and make needs known  Psychiatric: appropriate affect  Other:       Pertinent Lab and or Imaging Tests:  Lab Results   Component Value Date/Time    Sodium 127 (L) 09/26/2022 06:45 AM    Potassium 4.6 09/26/2022 06:45 AM    Chloride 92 (L) 09/26/2022 06:45 AM    CO2 29 09/26/2022 06:45 AM    Anion gap 6 09/26/2022 06:45 AM    Glucose 73 09/26/2022 06:45 AM    BUN 19 09/26/2022 06:45 AM    Creatinine 0.92 09/26/2022 06:45 AM    BUN/Creatinine ratio 21 (H) 09/26/2022 06:45 AM    GFR est AA >60 09/26/2022 06:45 AM    GFR est non-AA 57 (L) 09/26/2022 06:45 AM    Calcium 8.4 (L) 09/26/2022 06:45 AM     Lab Results   Component Value Date/Time    Protein, total 6.0 (L) 09/24/2022 02:19 AM    Albumin 2.9 (L) 09/24/2022 02:19 AM           Holly Chavez FNP-C

## 2022-09-26 NOTE — HOSPICE
[facility] Help to Those in Need  [phone]    Patient Name: Nile Mcdonnell  YOB: 1928  Age: 80 y.o. [facility] STELLA RN Note:  Hospice consult noted. Chart reviewed. Plan of care discussed with patients nurse & care manager. Dr Murali Barrett and myself in to meet with son and DIL. Discussed Hospice philosophy, general plan of care, levels of care, services and on call procedures. Family information packet provided & reviewed with family  Family is very tearful and struggling to accept that she will not get better and go home. They do not want her to suffer and agree with GIP hospice and Bi-pap removal once medicated and comfortable  Patient's son has gone to get grandchildren to visit her. Once they have had time with their grandmother we will proceed with bi-pap  removal.  Discharge order from Dr Murali Barrett  CTI from Dr Fiorella Hi orders from Desiree Noel NP. Thank you for the opportunity to be of service to this patient.      Niecy Garner RN  Clinical Nurse Liaison   [facility]  (F)[phone] (T) [phone]

## 2022-09-26 NOTE — HOSPICE
[facility]  [phone]     Patient Name: Camryn March  YOB: 1928  Age: 80 y.o. [address] [facility] RN Note: Patient's insurance no longer active. Approval obtained to admit her GIP as jane case. Thank you for the opportunity to be of service to this patient.     Almas Fleming RN  Clinical Nurse Liaison   [address] [place] [address]  (j)[phone] (P) [phone]

## 2022-09-26 NOTE — PROGRESS NOTES
Physical Therapy      Reviewed chart noted pt has comfort measure order in place. At this time will complete orders and discontinue PT services.

## 2022-09-26 NOTE — PROGRESS NOTES
TRANSFER - IN REPORT:    Verbal report received from GEMMA Gonzalez(name) on [address]  being received from 6W(unit) for routine progression of care      Report consisted of patients Situation, Background, Assessment and   Recommendations(SBAR). Information from the following report(s) SBAR, ED Summary, Procedure Summary, Intake/Output, MAR, Recent Results, and Med Rec Status   was reviewed with the receiving nurse. Opportunity for questions and clarification was provided. Assessment completed upon patients arrival to unit and care assumed.

## 2022-09-26 NOTE — PROGRESS NOTES
Occupational Therapy:     Chart reviewed in prep for OT evaluation and tx. Noted, worsening ABGs and pt now on BiPAP. Discussed with RN who confirmed pt is not appropriate for participation in therapy at this time. Will hold and follow up as able and medically appropriate.      Thank you,   Palmer Wan, OTD, OTR/L

## 2022-09-26 NOTE — PROGRESS NOTES
Physical Therapy      Chart reviewed in prep for PT session. Noted, worsening ABGs and pt now on BiPAP. Discussed with RN who confirmed pt is not appropriate for participation in therapy at this time. Also noted hospice consult. Will hold and follow up as able and medically appropriate.

## 2022-09-26 NOTE — PROGRESS NOTES
CC  Worseing Resp. Failure  while  On Bipap  Pt.  Was  admitted  with Acute hypoxic hypercarbic respiratory failure    PLAN  Discussed with RN to  call rapid team stat to eval at [facility] for Possible Intubation

## 2022-09-26 NOTE — PROGRESS NOTES
Problem: Airway Clearance - Ineffective  Goal: Achieve or maintain patent airway  9/26/2022 1333 by Mendiola Breech  Outcome: Progressing Towards Goal  9/26/2022 1329 by Mendiola Breech  Outcome: Resolved/Not Met     Problem: Gas Exchange - Impaired  Goal: Absence of hypoxia  9/26/2022 1333 by Mendiola Breech  Outcome: Progressing Towards Goal  9/26/2022 1329 by Mendiola Breech  Outcome: Resolved/Not Met  Goal: Promote optimal lung function  9/26/2022 1333 by Mendiola Breech  Outcome: Progressing Towards Goal  9/26/2022 1329 by Mendiola Breech  Outcome: Resolved/Not Met     Problem: Breathing Pattern - Ineffective  Goal: Ability to achieve and maintain a regular respiratory rate  9/26/2022 1333 by Mendiola Breech  Outcome: Progressing Towards Goal  9/26/2022 1329 by Mendiola Breech  Outcome: Resolved/Not Met     Problem:  Body Temperature -  Risk of, Imbalanced  Goal: Ability to maintain a body temperature within defined limits  9/26/2022 1333 by Mendiola Breech  Outcome: Progressing Towards Goal  9/26/2022 1329 by Mendiola Breech  Outcome: Resolved/Not Met  Goal: Will regain or maintain usual level of consciousness  Outcome: Progressing Towards Goal  Goal: Complications related to the disease process, condition or treatment will be avoided or minimized  9/26/2022 1333 by Mendiola Breech  Outcome: Progressing Towards Goal  9/26/2022 1329 by Mendiola Breech  Outcome: Resolved/Not Met     Problem: Isolation Precautions - Risk of Spread of Infection  Goal: Prevent transmission of infectious organism to others  9/26/2022 1333 by Mendiola Breech  Outcome: Progressing Towards Goal  9/26/2022 1329 by Mendiola Breech  Outcome: Resolved/Not Met     Problem: Nutrition Deficits  Goal: Optimize nutrtional status  9/26/2022 1333 by Mendiola Breech  Outcome: Progressing Towards Goal  9/26/2022 1329 by Mendiola Breech  Outcome: Resolved/Not Met     Problem: Risk for Fluid Volume Deficit  Goal: Maintain normal heart rhythm  9/26/2022 1333 by Deena Calixto  Outcome: Progressing Towards Goal  9/26/2022 1329 by Deena Calixto  Outcome: Resolved/Not Met  Goal: Maintain absence of muscle cramping  9/26/2022 1333 by Deena Calixto  Outcome: Progressing Towards Goal  9/26/2022 1329 by Deena Calixto  Outcome: Resolved/Not Met  Goal: Maintain normal serum potassium, sodium, calcium, phosphorus, and pH  9/26/2022 1333 by Deena Calixto  Outcome: Progressing Towards Goal  9/26/2022 1329 by Deena Calixto  Outcome: Resolved/Not Met     Problem: Loneliness or Risk for Loneliness  Goal: Demonstrate positive use of time alone when socialization is not possible  9/26/2022 1333 by Deena Calixto  Outcome: Progressing Towards Goal  9/26/2022 1329 by Deena Calixto  Outcome: Resolved/Not Met     Problem: Fatigue  Goal: Verbalize increase energy and improved vitality  9/26/2022 1333 by Deena Calixto  Outcome: Progressing Towards Goal  9/26/2022 1329 by Deena Calixto  Outcome: Resolved/Not Met     Problem: Patient Education: Go to Patient Education Activity  Goal: Patient/Family Education  9/26/2022 1333 by Deena Calixto  Outcome: Progressing Towards Goal  9/26/2022 1329 by Deena Calixto  Outcome: Resolved/Not Met     Problem: Risk for Spread of Infection  Goal: Prevent transmission of infectious organism to others  Description: Prevent the transmission of infectious organisms to other patients, staff members, and visitors.   9/26/2022 1333 by Deena Calixto  Outcome: Progressing Towards Goal  9/26/2022 1329 by Deena Calixto  Outcome: Resolved/Not Met     Problem: Patient Education:  Go to Education Activity  Goal: Patient/Family Education  9/26/2022 1333 by Deena Calixto  Outcome: Progressing Towards Goal  9/26/2022 1329 by Deena Calixto  Outcome: Resolved/Not Met     Problem: Dyspnea Due to End of Life  Goal: Demonstrate understanding of and ability to manage respiratory symptoms at end of life  Outcome: Progressing Towards Goal     Problem: Communication Deficit  Goal: Effectively communicate symptoms, needs, and concerns  Outcome: Progressing Towards Goal     Problem: Imminent Death  Goal: Collaborate with patient/family/caregiver/interdisciplinary team to minimize and manage end of life symptoms  Outcome: Progressing Towards Goal     Problem: Hospice Orientation  Goal: Demonstrate understanding of hospice philosophy, plan of care, and home hospice program  Description: The patient/family/caregiver will demonstrate understanding of hospice philosophy, plan of care and the home hospice program as evidenced by participation in meeting the patient's psychosocial, spiritual, medical, and physical needs inclusive of medical supplies/equipment focusing on symptoms. Outcome: Progressing Towards Goal     Problem: Potential for Alteration in Skin Integrity  Goal: Monitor skin for areas of alteration in skin integrity  Description: Patient/family/caregiver will demonstrate ability to care for patient's skin, monitor for areas of breakdown, and demonstrate methods to prevent breakdown during hospice care. Outcome: Progressing Towards Goal     Problem: Risk for Falls  Goal: Free of falls during inpatient stay  Description: Patient will be free of falls during inpatient stay. Outcome: Progressing Towards Goal     Problem: Pain  Goal: *Control of acute pain  Outcome: Progressing Towards Goal     Problem: Anticipatory Grief  Goal: Explore reactions to and verbalize acceptance of impending loss  Description: Patient/family/caregiver will explore reactions to and verbalize acceptance of impending loss. Outcome: Progressing Towards Goal     Problem: Anxiety/Agitation  Goal: Verbalize or staff assess the ability to manage anxiety  Description: The patient/family/caregiver will verbalize and demonstrate ability to manage the patient's anxiety throughout hospice care.   Outcome: Progressing Towards Goal     Problem: Breathing Pattern - Ineffective  Goal: *Use of effective breathing techniques  Outcome: Progressing Towards Goal     Problem: Pressure Injury - Risk of  Goal: *Prevention of pressure injury  Outcome: Progressing Towards Goal  Note: Pressure Injury Interventions:                                            Problem: Grieving  Goal: *Able to identify stages of grieving process  Outcome: Progressing Towards Goal     Problem: Seizures  Goal: *STG: Remains free of seizure activity  Outcome: Progressing Towards Goal  Goal: *STG: Remains free of injury during seizure activity  Outcome: Progressing Towards Goal  Goal: Interventions  Outcome: Progressing Towards Goal     Problem: End of Life Process  Goal: Demonstrate understanding of end of life processes  Description: Patient/caregiver will understand end of life processes.   Outcome: Progressing Towards Goal     Problem: Dyspnea Due to End of Life  Goal: Demonstrate understanding of and ability to manage respiratory symptoms at end of life  Outcome: Progressing Towards Goal     Problem: Imminent Death  Goal: Collaborate with patient/family/caregiver/interdisciplinary team to minimize and manage end of life symptoms  Outcome: Progressing Towards Goal     Problem: Discharge Planning  Goal: *Participates in discharge planning  Outcome: Progressing Towards Goal

## 2022-09-26 NOTE — HOSPICE
[facility] Help to Those in Need  [phone]    Inpatient Nursing Admission   Patient Name: Dusty Abebe  YOB: 1928  Age: 80 y.o. Date of Hospice Admission: 9/26/2022  Hospice Attending Elected by Patient: Ana Brown MD  Primary Care Physician: Moe Manuel MD  Admitting RN: Chele Mg RN  : Bonita Canales LCSW     Level of Care (GIP/Routine/Respite): [facility]  Facility of Care: [address] [place] [address]  Patient Room: [facility]     HOSPICE SUMMARY   ER Visits/ Hospitalizations in past year: 1  Hospice Diagnosis: Acute respiratory failure with hypoxia ([address] [J96.01]  Onset Date of Hospice Diagnosis: 9/26/2022  Summary of Disease Progression Leading to Hospice Diagnosis: per Dr Kishan Zhou:  Admission Summary:   HPI: Dusty Abebe is a 80 y.o. female who presents with shortness of breath.       Patient is a poor story and, history was probably obtained from chart review, apparently patient was diagnosed with COVID 4 weeks ago, since last night has had increasing shortness of breath cough and confusion, she has not been eating drinking well, came to the ER and was requested to be admitted to the hospital service, currently confused not much history could be obtained from the patient\"        Interval history / Subjective:   Patient seen examined at bedside ABG this morning shows increased hypercapnia , son and daughter-in-law at bedside spoke with them  agreeable to bipap, but are interested in speaking to hospice       Assessment & Plan:       Acute hypoxic hypercarbic respiratory failure  Recent history of COVID-19  CAP  Severe hyponatremia -likely siadh, improving  Acute metabolic encephalopathy  Thoracic aortic aneurysm  GERD  -Continue antibiotics Rocephin azithro  -Increased hypercapnia on ABG, start BiPAP transfer to IMCU,  - npo on bipap  -As needed antitussives  -rpt abg for tomorrow  -Appreciate pulm  -obtain TTE  - Appreciate nephrology, continue fluid restriction   -cont PPI for gerd   -PTOT         Had extensive discussion with family at bedside and explained that patient high risk to clinically deteriorate. There wish for patient is to be able to return home under comfort, agreeable to hospice evaluation , would like to try bipap to see if she improves reiterated no intubation. Hospice consulted for eval.         Hospice evaluation/admission:  Patient is alert and verbal with intermittent confusion noted. She is on bi-pap at time of evaluation. Long discussion with son and DIL. They agree with GIP hospice admission and removal of bi-pap. If patient able to tolerate they would like to have her at home with hospice services. CTI from Dr Cynthia Vna for acute hypoxic respiratory failure  Discharge order from Dr Corley Balloon orders from Estefany Li NP    Co-Morbidities:   Patient Active Problem List   Diagnosis Code    Respiratory failure ([facility] [address] [facility] J96.90    Acute respiratory failure with hypoxia ([facility]) J96.01     Diagnoses RELATED to the terminal prognosis: Covid, pleural effusions  Other Diagnoses: interstitial lung disease    Rationale for a prognosis of life expectancy of 6 months or less if the disease follows its normal course (Disease Specific History):     Claudia Ruffin is a 80 y. o. who was admitted to [facility]. The patient's principle diagnosis of acute hypoxic respiratory failure has resulted in pursuit of hospice. Functionally, the patient's Palliative Performance Scale has declined over a period of days and is estimated at 20. Objective information that support this patients limited prognosis includes: respiratory distress, poor po intake, covid+. .  The patient/family chose comfort measures with the support of Hospice.     Patient meets for GIP LOC as evidenced by : requiring frequent skilled nursing assessments and administration of parenteral medications to manage symptoms    Prognosis estimated based on 09/26/22 clinical assessment is:   [] Few to Many Hours  [] Hours to Days   [x] Few to Many Days   [] Days to Weeks   [] Few to Many Weeks   [] Weeks to Months   [] Few to Many Months    ASSESSMENT    Patient self-reports:  []  Yes    [x] No    SYMPTOMS: fatigue, dyspnea, dysphagia, pain, anxiety    SIGNS/PHYSICAL FINDINGS: very lethargic with labored breathing, poor appetite with dysphagia, increased weakness and now unable to ambulate, generalized pain and anxiety noted. KARNOFSKY: 20    FAST for all dementia:  n/a    Learning Assessment:  Patient  Is patient willing/able to learn? no  What is the highest level of education completed? Learning preference (written material, demonstration, visual)? Learning barriers (ESOL, [place], poor vision)? Caregiver  Is caregiver willing to learn care for patient? yes  What is the highest level of education completed?unknown  Learning preference (written material, demonstration, visual)? verbal  Learning barriers (ESOL, [place], poor vision)? none    CLINICAL INFORMATION     Wt Readings from Last 3 Encounters:   09/25/22 75.9 kg (167 lb 5.3 oz)   07/06/22 75.2 kg (165 lb 12.6 oz)     Ht Readings from Last 3 Encounters:   No data found for Ht     There is no height or weight on file to calculate BMI. Visit Vitals  BP (!) 132/47 (BP 1 Location: Left upper arm, BP Patient Position: At rest;Semi fowlers)   Pulse 78   Temp 98.3 °F (36.8 °C)   Resp 17   SpO2 100%       LAB VALUES  No results found for this visit on 09/26/22 (from the past 12 hour(s)). No results found for this visit on 09/26/22 (from the past 6 hour(s)). Lab Results   Component Value Date/Time    Protein, total 6.0 (L) 09/24/2022 02:19 AM    Albumin 2.9 (L) 09/24/2022 02:19 AM       Currently this patient has:  [x] Supplemental O2    PLAN     1. Education of patient and family regarding end of life care and Hospice plan of care  2. Provide education and support to unit staff caring for hospice patient and family.  Provide staff with direct contact information to reach hospice team [phone]   3. Provide support and frequent rounds for patient comfort and safety ongoing  4. Provide  support ongoing, continue to discuss discharge plan if patient becomes alec and does not require acute nursing care interventions for GIP level of care  5. Provide  and bereavement support ongoing  6. Oxygen NC 5L   7. Schedule morphine 1mg IV every 4 hours  8. Add PRN comfort set for breakthrough symptoms. 9. Maintain skin integrity as tolerated for hospice patient, turning and repositioning for comfort, and specialty mattress if appropriate  10. Muller care per hospital policy for infection prevention  11. Central line care as per hospital policy for infection prevention  12. Caregiver Support: provide emotional support to family and assist with dispo home if stable    Hospice Team Frequency Orders:  Skilled Nurse -   Daily x 7 days /every other day x 7 days  with 5 PRN visits for symptom control. MSW - 1 visit for initial assessment/evaluation for family support and need for volunteer services. Amber Horowitz - 1 visit for initial assessment/evaluation for spiritual support. ADVANCE CARE PLANNING (Complete in ACP Flow Sheet)   Code Status: DNR  Durable DNR: [x]  Yes  []  No  Code Status Discussed/Confirmed:YES  Preference for Other Life Sustaining Treatment Discussed/Confirmed:YES  Hospitalization Preference:[facility]  Advance Care Planning 2022   Confirm Advance Directive Yes, not on file        Service: [] Yes  [x]  No      [] Unknown  Appropriate for Pinning Ceremony:  [] Yes     [] No  Sabianist: Restoration   Home: TBD    DISCHARGE PLANNING     1. Discharge Plan: plan will be to go home with hospice tomorrow if stable  2. Patient/Family teaching: hospice philosophy, levels of care and services to be provided. End of life symptoms and care.    3. Response to patient/family teaching: Diana Reid and his wife Shama verbalized understanding and are in agreement with hospice plan of care. SOCIAL/EMOTIONAL/SPIRITUAL NEEDS     Spiritual Issues Identified: open to  visits. Psych/ Social/ Emotional Issues Identified: lives with son and DIL, moved here from [facility] in December. Family very tearful and praying she will be able go home. Caregiver Support:  [x] Provided information on End of Life Care   [x] Material Provided: [facility] [employer] [facility] or [place] [facility]  contacted, discharge to hospice order received  Dr. Ayden Villasenor contacted, agrees to serve as attending provider for hospice and provided verbal certification of terminal illness with life expectancy of 6 months or less. Orders for hospice admission, medications and plan of treatment received. Medication reconciliation completed.   MEDS: See medication list below  DME: Per hospital  Supplies: Per hospital  IDT communication to include MD, , SW, CH and support team    ALLERGIES AND MEDICATIONS     Allergies: No Known Allergies  Current Facility-Administered Medications   Medication Dose Route Frequency    ketorolac (TORADOL) injection 30 mg  30 mg IntraVENous Q8H PRN    bisacodyL (DULCOLAX) suppository 10 mg  10 mg Rectal DAILY PRN    morphine injection 1 mg  1 mg IntraVENous Q4H    morphine injection 1 mg  1 mg IntraVENous Q15MIN PRN    diazePAM (VALIUM) injection 5 mg  5 mg IntraVENous Q6H PRN        Paul Nino RN  Clinical Nurse Liaison   [facility]  (M)[phone] (T) [phone]

## 2022-09-26 NOTE — HOSPICE
[facility] Help to Those in Need  [phone]     Patient Name: Mady Caruso  YOB: 1928  Age: 80 y.o. [facility] Hospice RN Note:  Family is really struggling and son is now requesting a repeat of her ABGs. Order has been placed. Dr Jean-Pierre Hansen is aware. Discharge to hospice on hold for the moment. Thank you for the opportunity to be of service to this patient.      Jesse Guerra RN  Clinical Nurse Liaison   [facility]  P)[phone] (V) [phone]

## 2022-09-26 NOTE — HOSPICE
[facility] Help to Those in Need  [phone]    Social Work Admission Note  Patient Name: Puneet Wilson  YOB: 1928  Age: 80 y.o. Date of Visit: 09/26/22  Facility of Care: [facility]  Patient Room: [address] Attending: Alex Stapleton MD  Hospice Diagnosis: Acute respiratory failure with hypoxia ([address] [J96.01]    Level of Care:    [x]  GIP    []  Respite   []  Routine    Consents/NCD Documentation:     Consents Reviewed:   []  Yes  [x]  No, completed by other hospice staff member. Person Reviewed/Signed with:  []  Patient   []  Pts NOK/MPOA  Name:     Right to NCD Reviewed:   []  Yes  []  No, completed by other hospice staff member. NCD Requested:   []  Yes  []  No    Admission Nurse/Intake Notified NCD was requested:  []  Yes  []  No  []  Not requested    Planned Start of Care Date:     Hospice Witness Representative:    NARRATIVE   This LCSW met with pts son outside the room due to pts COVID 19 precautions. Pt is a 79 [facility] female, who is . Pt moved from [facility] 12/21 to live with son and his family. Pt was admitted 9/23/2022 from home due to 2 weeks worsening cough and SOB. BiPap was removed today, pt is now on 4 LO2. Per hospice RN, the goal is for pt to return home if pt stabilized after BiPap removal.  Per son, pt is sitting up in the bed, requesting soft food. LCSW made transportation arrangements for her return home tomorrow at 10 am with [facility]. Pt is uninsured at this time, LCSW sent pictures of pts insurance information from son to hospice office. LCSW will began FAP application with son as pt will be jane. He will complete and get needed supporting documentation. Son reports pt hs income from [facility] from 's pension. DDNR completed with son. LCSW will continue to assess and monitor pt and family needs.       ADVANCE CARE PLANNING    Advance Directive:  []  Yes  [x]  No   []  Would like to complete  Primary MPOA:  Secondary MPOA: LNOK: Brittny Horner   Code Status: DDNR   Durable DNR: Noe Ramirez  _ No  Advance Care Planning 2022   Confirm Advance Directive Yes, not on file       Relationship Status:  []  Single     []        []      []  Domestic Partner     [x]  /  []  Common Law  []    []  Unknown    If in a relationship, name of partner/spouse:  Duration of relationship:    Gnosticism/Spirituality: Anabaptist  []  Active In Gnosticism/Spirituality   []  Not Active   []  N/A  Notes:     Home: unknown   Resources Provided:  []  LINC  []  Information on applying for disability  []  FMLA Paperwork  []  Letters Requested by [facility]   []  Hilario Hays  []  Final Arrangements Resources   []  Outside counseling services (individual or group therapy)  []  Grief resources   []  Jose C Delgado  []  Peter Blueberry   []  [facility]   []  [facility] [employer] [facility]   []  Referral Sent to [employer]   []  Referral Sent to Music Therapy   []  Referral Sent to Pet Therapy  [x]  Other FAP   Social Work Initial Assessment     Sex:  female    Pronoun Preference:   []  He/Him/His   [x]  She/Her/Hers   []  They/Them/Theirs   []   Patient Name   []   Decline to Answer  []   Unknown  []   Other   Notes:     Race/Ethnicity: (atilio all that apply)  []  [place] [facility] [place] [facility] or [place] Native  [x]    []  Black or [place] American  []   or   []  [facility] or [facility]  []  White  []  Unknown  []  Other     Inpatient Financial Agreement Completed:   [x]  Yes  []  No    Insurance:   []  Medicare   []  Medicaid     []  [employer]    [x]  Self-Pay/Uninsured   Notes:      Has pt applied for FAP? [x]  Needs to Apply., LCSW is working with family to apply   []  Application Completed and Submitted   []  Approved/ Expiration Date:   []  N/A  Notes:     Has pt applied for Medicaid?   []  Needs to Apply  []  Application Completed and Submitted/Application Number:   [x] N/A  Notes:     Has a long term care screening ([facility]) been requested? []  Requested   []  Not Requested, Needs follow up  []  Completed  [x]  N/A  Notes:     Does the pt have a long term care insurance policy? []  Yes  [x]  No  []  Yes, Needing assistance with paperwork  Notes:      Service:    []  Yes   []  No       [x]  Unknown    Appropriate for Pinning Ceremony:   []  Yes      [x]  No    Is patient using VA benefits? []  Yes      []  No  []  Needs assistance with accessing benefits  Notes:       Work History:   []  Full-Time/Part-Time  []  Retired   []  Other  Notes: unknown     Primary Language: unknown  []   Needed  []   utilized during visit  []   Waived/ form completed    Ability to express thoughts/needs/feelings  [x]  Expressed thoughts/feelings/needs without difficulty  []  Requires extra time and cuing  []  Speech limited single words  []  Uses only gestures (eye, blinking eye or head movement/pointing)  []  Unable to express thoughts/feelings/needs (speech unintelligible or inappropriate)  []  Unresponsive  Notes:      Mental Status:  [x]  Alert-oriented to:     [x]  Person     []  Place     []  Time  []  Comatose-responds to:    []   Verbal stimuli    []  Tactile stimuli    []  Painful stimuli  []  Forgetful  [x]  Disoriented/Confused  [x]  Lethargic  []  Agitated  []  Unresponsive  []  Other (specify):    Notes:      Patients description of Illness/Current Health Status:    [x]  Patient unable to discuss  []  Patient unwilling to discuss  []  (Specify)        Knowledge/Understanding of Disease Process  Patient:    []  Demonstrates knowledge/understanding of disease process   []  Demonstrates knowledge/understanding of treatment plan   []  Demonstrates knowledge/understanding of prognosis   []  Demonstrates acceptance of prognosis   []  Demonstrates knowledge/understanding of resuscitation status   [x]  Other (specify)  Caregiver:   [x]  Demonstrates knowledge/understanding of disease process   [x]  Demonstrates knowledge/understanding of treatment plan   [x]  Demonstrates knowledge/understanding of prognosis   [x]  Demonstrates acceptance of prognosis   [x]  Demonstrates knowledge/understanding of resuscitation status   []  Other (specify)  Notes:      Patients living arrangement/care setting:  Use the PRIOR COLUMN when the PATIENTS current health status necessitated a change in his/her primary residence. Prior Current Response              []             []    Patients own home/residence              [x]             []    Home of family member/friend              []             []    Boarding home/Group Home              []             []    Assisted living facility/snf center              []             []    Hospital/Acute care facility              []             []    Skilled nursing facility              []             []    Long term care facility/Nursing home              []             [x]    Hospice in Patient      Primary Caregiver:  []  No Primary Caregiver  Name of Primary Caregiver: Mala Marcus   Primary Caregiver Phone Number: [phone]  Relationship or Primary Caregiver:    []  Spouse/Significant other       [x]  Child        []  Step child       []  Sibling   []  Parent   []  Friend/Neighbor   []  Community/Restorationism Volunteer   []  Paid help   []  Other (specify):___________  Notes:       Family members/Significant others:  Name: ERIC   Relationship:  Phone Number:  Actively involved in care? [x]  Yes  []  No    Name:  Relationship:  Phone Number:  Actively involved in care?   []  Yes  []  No    Social support systems: (select ONE best description)  [x]  Excellent social support system which includes three or more family members or friends  []  Good social support system which includes two or less members or friends  []  [address] support which includes one family member or friend  []  Poor social support; no family members or friends; basically ALONE  Notes:      Emotional Status: (atilio all that apply)    Patient Caregiver Response                 [x]                [x]    Mood/Affect stable and appropriate                   []                []    Angry                 []                []    Anxious                 []                []    Apprehensive                 []                []    Avoidant                 []                []    Clinging                 []                []    Depressed                 []                []    Distraught                 []                []    Fearful                 []                []    Flat Affect                 []                []    Helpless                 []                []    Hostile                 []                []    Impulsive                 []                []    Irritable                 []                []    Labile                 []                []    Manic                 []                []    Restlessness                 []                []    Sad                 []                []    Strain/Stress                 []                []    Suspicious                 []                []     Tearful                 []                 []     Withdrawn          Notes:     Coping Skills (strengths/weakness):    Patient: Coping Skills (strength/weakness): LCSW dis not meet, due to COVID 19 contact precautions.    Family/caregiver (strength/weakness):     [place] of care upon discharge (atilio all that apply):     [x]  No burden evident   []  Family must administer medications   []  Illness causing financial strain   []  Family/Support feels overwhelmed   []  Family/Support sleep disturbed with patients care   []  Patients care causes extra physical stress  of death  []  Illness causes changes in family lifestyle  []  Illness impacting family/support employment  []  Family experiencing increased time demands  []  Patients behavior endangers family  [] Denial of patients illness  []  Concern over outcome of illness/fear  []  Patients behavior embarrassing to family   Notes:      Risk Factors: (atilio all that apply):    [x]  No burden evident   []  Alcohol abuse  []  Financial resources inadequate to meet basic needs (food/house/etc)  []  Financial resources inadequate to meet health care needs (supplies/equipment/medications)  []  Food/nutrition resources inadequate  []  Home environment unsafe/inadequate for home care  []  Homicidal risk  []  Lives alone or without concerned relatives  []  Multiple medications/complex schedule  []  Physical limitations increase likelihood of falls  []  Plan of care/treatments complicated  []  Substance use/abuse  []  Suicidal risk  []  Visual impairment threatens safety/ability to perform self-care  []  Other (specify):     Abuse/Neglect (actual/potential risks):  [x]  No signs of abuse/neglect  []  History of abuse/neglect                 []  Physical          []  Sexual  []  History of domestic violence  []  Lacks adequate physical care  []  Lacks emotional nurturing/support  []  Lacks appropriate stimulation/cognitive experiences  []  Left alone inappropriately  []  Lacks necessary supervision  []  Inadequate or delayed medical care  []  Unsafe environment (i.e guns/drug use/history of violence in the home/etc.)  []  Bruising or other physical signs of injury present  []  Refer to child/adult protective services  []  Other (specify):   Notes:      Current Sources of Stress (in Addition to Current Illness):   []  None reported  []  Bills/Debt    []  Career/Job change    []   (short term)    []   (long term)    []  Death of a child (recent)    []  Death of a parent (recent)   []  Death of a spouse (recent)   []  Employment status changed   []  Family discord    []  Financial loss/Inadequate inther (specify):come  []  Job loss  []  Legal issues unresolved  []  Lifestyle change  []  Marital discord  [] Marriage within the last year  []  Paperwork (insurance/legal/etc) overwhelming  []  Separation/Divorce  [x]  Other (specify): COVID 19 infection   Notes:       Interventions/Plan of Care   []  MSW will assess social and emotional factors related to coping with end of life issues  [x]  MSW will provide community resource planning/referral   []  MSW to assist with relocation to different care setting if/when symptoms stabilize  [x]  Pt/Pts family will receive emotional support. []  Pt/Pts family will share the details surrounding the pts disease progression  []  Pt/Pts Family will show expression of grief by participating in life review. []  Pt/Pts Family will be educated and able to verbalize understanding of mental, emotional, and physical symptoms of grief. []  Pt/Pts Family will be educated and able to identify skills and social support to help cope with grief. []  Pts family will receive support for grief with emphasis on developmentally appropriate language.   [] Other:   Notes:       Discharge Planning   [x]  Home with family member    []  Return back to nursing home/facility  []  Needs assistance with placement/paid caregivers  []  Short Stay under routine level of care at [facility]   []  Other  Notes:     MSW Assessment Completed by: Elisa Major  09/26/22    Time In:2:00 pm        Time Out:3:00 pm

## 2022-09-27 ENCOUNTER — HOME CARE VISIT (OUTPATIENT)
Dept: SCHEDULING | Facility: HOME HEALTH | Age: 87
End: 2022-09-27

## 2022-09-27 ENCOUNTER — HOME CARE VISIT (OUTPATIENT)
Dept: HOSPICE | Facility: HOSPICE | Age: 87
End: 2022-09-27

## 2022-09-27 VITALS
SYSTOLIC BLOOD PRESSURE: 145 MMHG | HEART RATE: 68 BPM | DIASTOLIC BLOOD PRESSURE: 75 MMHG | OXYGEN SATURATION: 99 % | RESPIRATION RATE: 16 BRPM

## 2022-09-27 VITALS
DIASTOLIC BLOOD PRESSURE: 62 MMHG | TEMPERATURE: 98 F | OXYGEN SATURATION: 98 % | RESPIRATION RATE: 21 BRPM | HEART RATE: 77 BPM | SYSTOLIC BLOOD PRESSURE: 151 MMHG

## 2022-09-27 PROCEDURE — G0299 HHS/HOSPICE OF RN EA 15 MIN: HCPCS

## 2022-09-27 PROCEDURE — 0651 HSPC ROUTINE HOME CARE

## 2022-09-27 PROCEDURE — 74011250636 HC RX REV CODE- 250/636: Performed by: NURSE PRACTITIONER

## 2022-09-27 PROCEDURE — HOSPICE MEDICATION HC HH HOSPICE MEDICATION

## 2022-09-27 RX ORDER — GUAIFENESIN 100 MG/5ML
100 SOLUTION ORAL
Status: DISCONTINUED | OUTPATIENT
Start: 2022-09-27 | End: 2022-09-27 | Stop reason: HOSPADM

## 2022-09-27 RX ADMIN — MORPHINE SULFATE 1 MG: 2 INJECTION, SOLUTION INTRAMUSCULAR; INTRAVENOUS at 11:20

## 2022-09-27 RX ADMIN — MORPHINE SULFATE 1 MG: 2 INJECTION, SOLUTION INTRAMUSCULAR; INTRAVENOUS at 08:55

## 2022-09-27 RX ADMIN — MORPHINE SULFATE 1 MG: 2 INJECTION, SOLUTION INTRAMUSCULAR; INTRAVENOUS at 05:10

## 2022-09-27 NOTE — HOSPICE
[facility]  Help to Those in Need  [phone]     Patient Name: Carl Cortez  YOB: 1928  Age: 80 y.o. [employer] RN Note:  Beside assessment. Patient is stable to go home. DIL at bedside and has picked up her medications. Supplies gath ered and report called to nurse completing [facility] visit. Transport delayed until 10:30am.     Thank you for the opportunity to be of service to this patient.      Lyndsay Concepcion RN  Clinical Nurse Liaison   Brad Mclean  J)[phone] (F) [phone]

## 2022-09-27 NOTE — HOSPICE
arrived from [facility] via stretcher. pt insisted she wanted to get off of stretcher and ambulate to BR. writer and ERIC persuaded pt to wait until she was transferred to the hospital bed. incontinent of soft stool x 1. once she was in bed, she requested to ambulate to BR again. son assisted pt and she did not want any help in the bathroom. continent of urine and BM. resp even and unlabored. o2 at 4 L vis NC.   pt denies dyspnea, pain. breath sounds diminished with rhonchi heard in lower fields. congested cough, clear expectorate. active bowel sounds. pt requesting small meal and then wants to go sleep.  reviewed meds with son and DIL. reminded to call hospice for any needs.

## 2022-09-28 ENCOUNTER — HOME CARE VISIT (OUTPATIENT)
Dept: HOSPICE | Facility: HOSPICE | Age: 87
End: 2022-09-28

## 2022-09-28 ENCOUNTER — HOME CARE VISIT (OUTPATIENT)
Dept: SCHEDULING | Facility: HOME HEALTH | Age: 87
End: 2022-09-28

## 2022-09-28 PROBLEM — Z51.5 HOSPICE CARE: Status: ACTIVE | Noted: 2022-09-28

## 2022-09-28 PROCEDURE — G0299 HHS/HOSPICE OF RN EA 15 MIN: HCPCS

## 2022-09-28 PROCEDURE — HOSPICE MEDICATION HC HH HOSPICE MEDICATION

## 2022-09-28 PROCEDURE — 0651 HSPC ROUTINE HOME CARE

## 2022-09-29 ENCOUNTER — HOME CARE VISIT (OUTPATIENT)
Dept: HOSPICE | Facility: HOSPICE | Age: 87
End: 2022-09-29

## 2022-09-29 ENCOUNTER — HOME CARE VISIT (OUTPATIENT)
Dept: SCHEDULING | Facility: HOME HEALTH | Age: 87
End: 2022-09-29

## 2022-09-29 VITALS — HEART RATE: 98 BPM | DIASTOLIC BLOOD PRESSURE: 90 MMHG | RESPIRATION RATE: 24 BRPM | SYSTOLIC BLOOD PRESSURE: 140 MMHG

## 2022-09-29 LAB
BACTERIA SPEC CULT: NORMAL
SERVICE CMNT-IMP: NORMAL

## 2022-09-29 PROCEDURE — G0155 HHCP-SVS OF CSW,EA 15 MIN: HCPCS

## 2022-09-29 PROCEDURE — G0156 HHCP-SVS OF AIDE,EA 15 MIN: HCPCS

## 2022-09-29 PROCEDURE — 0651 HSPC ROUTINE HOME CARE

## 2022-09-29 NOTE — HOSPICE
This LCSW completed virtual visit as pts has contact precautions due to [address]. LCSW spoke with pts ERIC Real. DIL reports pt is feeling better after eating, DIL reports pt with increase appetite, she is feeding pt about every 3 hours. DIL reports she and , pts son Geovanny Palumbo, are taking this week off. ERIC is able to work from home, son only for next week. ERIC reports she is working a flexible schedule part time and is the primary caraegiver for pt. DIL reports pt appears to be feeling a \" little better\" each day and they are getting her up to the chair to eat. DIL notes pt now needs 24 hour support, assitance with transfering, and going to the bathroom. DIL very appreciative of HHA. ERIC reports she has 3 children at home. LCSW provided emotional support for ERIC in coping with her role as caregiver and with pts EOL. LCSW assessed caregiver needs. LCSW and DIL discuss resources for paid caregivers. ERIC reports they do not have the funds to pay agency fees, the will look at community caregivers. LCSW and ERIC discussed insurance and FAP. LCSW had began FAP with pts son Geovanny Palumbo while she was at [facility]. LCSW reviewed what supportibg documents were needed to complete FAP ( documentation of 's pension in [place]- per son she receives $500 per month). LCSW sent my email and richard Oliveira SWer email for nay needs and FAP follow up. Pt/family is Noemi Burton, they attend [employer]. LCSW will continue to assess and monitor pt and family needs.

## 2022-09-29 NOTE — HOSPICE
Writer arrived at patient home. Patient is laying in bed, she is having dyspnea with the use oxygen at 4 liters. Patient is coughing up a large amount of thick white phlegm. oxygen saturations on 4 liters 95-96, removed for a few miutes oxygen saturations 77%. Writer advised patient not to remove oxygen. Patient family is not doing COVID percautions, they have 3 children and 3 adults in the home. Writer advised to wear mask, wash hands, not to use the same bathroom etc. Writer spoke with Dr Abdulkadir Pedro order for duo nebs every 4 hopurs as needed. Writer reviewed comfort care medications and advised them to use them as needed. Patient is fatigue, she requies assistance to walk to the bathroom. Writer ask family to call with any questions or concerns.

## 2022-09-30 ENCOUNTER — HOME CARE VISIT (OUTPATIENT)
Dept: HOSPICE | Facility: HOSPICE | Age: 87
End: 2022-09-30

## 2022-09-30 ENCOUNTER — HOME CARE VISIT (OUTPATIENT)
Dept: SCHEDULING | Facility: HOME HEALTH | Age: 87
End: 2022-09-30

## 2022-09-30 PROCEDURE — 0651 HSPC ROUTINE HOME CARE

## 2022-09-30 PROCEDURE — G0299 HHS/HOSPICE OF RN EA 15 MIN: HCPCS

## 2022-09-30 NOTE — HOSPICE
call initiated to provide comfort and spiritual guidance to the patient, family and caregiver as they make their journey towards end of life. Introduced spiritual care services to pt's daughter-in-law Cathalene Mode; educated on hospice philosophy and the support roles of the team; offered empathic listening processing transition to hospice. Family shared patient initially was tired, but is slowly recovering from [place] and able to eat again. Completed spiritual history and reviewed coping resources: patient and family are Delilah Rivas and well supported by [address]. They welcome  support and prayers. Family requested callback next week to schedule a visit.

## 2022-10-01 PROCEDURE — 0651 HSPC ROUTINE HOME CARE

## 2022-10-02 PROCEDURE — 0651 HSPC ROUTINE HOME CARE

## 2022-10-03 ENCOUNTER — HOME CARE VISIT (OUTPATIENT)
Dept: SCHEDULING | Facility: HOME HEALTH | Age: 87
End: 2022-10-03

## 2022-10-03 VITALS
OXYGEN SATURATION: 100 % | RESPIRATION RATE: 20 BRPM | SYSTOLIC BLOOD PRESSURE: 120 MMHG | HEART RATE: 84 BPM | DIASTOLIC BLOOD PRESSURE: 84 MMHG | TEMPERATURE: 98.6 F

## 2022-10-03 PROCEDURE — 0651 HSPC ROUTINE HOME CARE

## 2022-10-03 PROCEDURE — G0156 HHCP-SVS OF AIDE,EA 15 MIN: HCPCS

## 2022-10-03 NOTE — HOSPICE
Routine SN assessment visit Received patient sitting up in chair in bedroom awake and alert   No S/S of pain or shortness of breath at rest and on cotinuous nasal 02 4L   Patient denires hving pain or SOB  Per son patient has improved a little recently  Intake is better  Cough is productive    To continue with current hospice plan of care  Informed son to call hospice with concerns / needs  Understanding verbalized

## 2022-10-04 ENCOUNTER — HOME CARE VISIT (OUTPATIENT)
Dept: HOSPICE | Facility: HOSPICE | Age: 87
End: 2022-10-04

## 2022-10-04 VITALS
DIASTOLIC BLOOD PRESSURE: 70 MMHG | RESPIRATION RATE: 20 BRPM | OXYGEN SATURATION: 99 % | SYSTOLIC BLOOD PRESSURE: 138 MMHG | HEART RATE: 83 BPM

## 2022-10-04 PROCEDURE — 0651 HSPC ROUTINE HOME CARE

## 2022-10-04 PROCEDURE — G0299 HHS/HOSPICE OF RN EA 15 MIN: HCPCS

## 2022-10-04 NOTE — HOSPICE
Patient looks better today. her lungs sounds better today. She is only coughing occasionally, small amount of phlegm. No fever. Patients oxygen saturations on 4 liters 99%, sitting on bed on room air 93%. Family reports patient's appetite has improved. Writer advised patient to continue wearing oxygen, family request to change to 3.5 liters. Writer reviewed medications and care plan with family. Writer ask family to call with any questions or concerns.

## 2022-10-05 PROCEDURE — 0651 HSPC ROUTINE HOME CARE

## 2022-10-06 ENCOUNTER — HOME CARE VISIT (OUTPATIENT)
Dept: SCHEDULING | Facility: HOME HEALTH | Age: 87
End: 2022-10-06

## 2022-10-06 PROCEDURE — G0156 HHCP-SVS OF AIDE,EA 15 MIN: HCPCS

## 2022-10-06 PROCEDURE — 0651 HSPC ROUTINE HOME CARE

## 2022-10-07 ENCOUNTER — HOME CARE VISIT (OUTPATIENT)
Dept: HOSPICE | Facility: HOSPICE | Age: 87
End: 2022-10-07

## 2022-10-07 PROCEDURE — 0651 HSPC ROUTINE HOME CARE

## 2022-10-08 PROCEDURE — 0651 HSPC ROUTINE HOME CARE

## 2022-10-09 PROCEDURE — 0651 HSPC ROUTINE HOME CARE

## 2022-10-10 ENCOUNTER — HOME CARE VISIT (OUTPATIENT)
Dept: SCHEDULING | Facility: HOME HEALTH | Age: 87
End: 2022-10-10

## 2022-10-10 PROCEDURE — G0156 HHCP-SVS OF AIDE,EA 15 MIN: HCPCS

## 2022-10-10 PROCEDURE — HOSPICE MEDICATION HC HH HOSPICE MEDICATION

## 2022-10-10 PROCEDURE — 0651 HSPC ROUTINE HOME CARE

## 2022-10-11 ENCOUNTER — HOME CARE VISIT (OUTPATIENT)
Dept: HOSPICE | Facility: HOSPICE | Age: 87
End: 2022-10-11

## 2022-10-11 ENCOUNTER — HOME CARE VISIT (OUTPATIENT)
Dept: SCHEDULING | Facility: HOME HEALTH | Age: 87
End: 2022-10-11

## 2022-10-11 VITALS — SYSTOLIC BLOOD PRESSURE: 138 MMHG | RESPIRATION RATE: 20 BRPM | DIASTOLIC BLOOD PRESSURE: 72 MMHG | HEART RATE: 88 BPM

## 2022-10-11 PROCEDURE — G0156 HHCP-SVS OF AIDE,EA 15 MIN: HCPCS

## 2022-10-11 PROCEDURE — 0651 HSPC ROUTINE HOME CARE

## 2022-10-11 PROCEDURE — G0299 HHS/HOSPICE OF RN EA 15 MIN: HCPCS

## 2022-10-11 PROCEDURE — G0155 HHCP-SVS OF CSW,EA 15 MIN: HCPCS

## 2022-10-11 NOTE — HOSPICE
Routine follow up visit to provide comfort and spiritual guidance to the patient, family and caregiver as they make their journey towards end of life. Upon arrival, patient seated on sofa w/ family. Daughter-in-law Shama and grandson present. Engaged in life review and active listening reflecting on patient's background from [facility], her career as an RN and how God's presence has been with her throughout her life especially during her illness journey. Reviewed coping resources: patient finds fidelina spending time w/ family and her local Zoroastrian community. Explored concerns: patient does not want to be a burden to family. Offered prayers for family's wellness and strength for end of life journey.

## 2022-10-11 NOTE — HOSPICE
Patient is sitting in the chair when writer and Tyrel Staff NP arrived. Patient is feeling better, she is eating well. Elizabeth Connelly ordered to turn oxygen down to 1.5 liters and increase protein intake. Patient able to take herself to the bathroom. 2 weeks ago patient needed assistance with everything. Writer ask family to call with any questions or concerns.

## 2022-10-12 PROCEDURE — 0651 HSPC ROUTINE HOME CARE

## 2022-10-12 NOTE — HOSPICE
LMSW arrived at the patient's home to complete an Initial Psychosocial Assessment visit for [facility]. The LMSW was greeted at the front door by the patient's son Darvin Browne and his wife Shama was present. The home was clean and clutter free. The patient is a 79 y/o 24 [facility] female with a [facility] Dx. Interstitial lung disease. The patient was received sitting on a chair in her bedroom. Patient understands very little [place] language. Patient appeared pleasant and comfortable. Patient gave thanks for this visit. Darvin Hollie was the POC for this visit. Darvin Browne reported that the patient came over for a vacation and had a fall which resulted in a laceration on the back of the patient's head. Patient then took ill and was in and out of the hospital a few times and then got COVID. Darvin Browne stated the patient would really like to return home to [facility]. LMSW stated that the patient needs O2, and the Airliners and cruise ships are not allowing O2 on board due to the O2 being an explosive device. LMSW revisited the FAP and supporting documentation. LMSW provided other services through hospice that are available and assisted with final arrangements. 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 Patient was  to Radhamesjoshua Driscoll for 16 years who had passed in 1984. The patient had four children Carla Carlson and Stephanie who lives in [facility] [place] and Darvin Browne who the patient resides with, and [place] Adjutant who had passed in 2020. Patient has an older sister Rossy who lives in [facility]. Patient belongs to the [employer].

## 2022-10-13 ENCOUNTER — HOME CARE VISIT (OUTPATIENT)
Dept: SCHEDULING | Facility: HOME HEALTH | Age: 87
End: 2022-10-13

## 2022-10-13 PROCEDURE — G0156 HHCP-SVS OF AIDE,EA 15 MIN: HCPCS

## 2022-10-13 PROCEDURE — 0651 HSPC ROUTINE HOME CARE

## 2022-10-14 PROCEDURE — 0651 HSPC ROUTINE HOME CARE

## 2022-10-15 ENCOUNTER — HOME CARE VISIT (OUTPATIENT)
Dept: HOSPICE | Facility: HOSPICE | Age: 87
End: 2022-10-15

## 2022-10-15 PROCEDURE — 0651 HSPC ROUTINE HOME CARE

## 2022-10-16 PROCEDURE — 0651 HSPC ROUTINE HOME CARE

## 2022-10-17 ENCOUNTER — HOME CARE VISIT (OUTPATIENT)
Dept: SCHEDULING | Facility: HOME HEALTH | Age: 87
End: 2022-10-17

## 2022-10-17 ENCOUNTER — HOME CARE VISIT (OUTPATIENT)
Dept: HOSPICE | Facility: HOSPICE | Age: 87
End: 2022-10-17

## 2022-10-17 PROCEDURE — 0651 HSPC ROUTINE HOME CARE

## 2022-10-17 PROCEDURE — G0156 HHCP-SVS OF AIDE,EA 15 MIN: HCPCS

## 2022-10-18 ENCOUNTER — HOME CARE VISIT (OUTPATIENT)
Dept: HOSPICE | Facility: HOSPICE | Age: 87
End: 2022-10-18

## 2022-10-18 VITALS — RESPIRATION RATE: 20 BRPM | HEART RATE: 94 BPM | DIASTOLIC BLOOD PRESSURE: 76 MMHG | SYSTOLIC BLOOD PRESSURE: 128 MMHG

## 2022-10-18 PROCEDURE — 0651 HSPC ROUTINE HOME CARE

## 2022-10-18 PROCEDURE — G0299 HHS/HOSPICE OF RN EA 15 MIN: HCPCS

## 2022-10-18 NOTE — HOSPICE
Patient walked into the living room, with out oxygen on, no signs of shortness of breath. Writer advised to wear oxygen as needed. Patient is eating well. Writer advised to patient to elevate feet to help with edema.

## 2022-10-19 ENCOUNTER — HOME CARE VISIT (OUTPATIENT)
Dept: SCHEDULING | Facility: HOME HEALTH | Age: 87
End: 2022-10-19

## 2022-10-19 PROCEDURE — 0651 HSPC ROUTINE HOME CARE

## 2022-10-19 PROCEDURE — G0156 HHCP-SVS OF AIDE,EA 15 MIN: HCPCS

## 2022-10-20 ENCOUNTER — HOME CARE VISIT (OUTPATIENT)
Dept: SCHEDULING | Facility: HOME HEALTH | Age: 87
End: 2022-10-20

## 2022-10-20 PROCEDURE — G0156 HHCP-SVS OF AIDE,EA 15 MIN: HCPCS

## 2022-10-20 PROCEDURE — 0651 HSPC ROUTINE HOME CARE

## 2022-10-21 ENCOUNTER — HOME CARE VISIT (OUTPATIENT)
Dept: HOSPICE | Facility: HOSPICE | Age: 87
End: 2022-10-21

## 2022-10-21 PROCEDURE — 0651 HSPC ROUTINE HOME CARE

## 2022-10-22 PROCEDURE — 0651 HSPC ROUTINE HOME CARE

## 2022-10-23 PROCEDURE — 0651 HSPC ROUTINE HOME CARE

## 2022-10-24 ENCOUNTER — HOME CARE VISIT (OUTPATIENT)
Dept: SCHEDULING | Facility: HOME HEALTH | Age: 87
End: 2022-10-24

## 2022-10-24 PROCEDURE — 0651 HSPC ROUTINE HOME CARE

## 2022-10-24 PROCEDURE — G0156 HHCP-SVS OF AIDE,EA 15 MIN: HCPCS

## 2022-10-25 ENCOUNTER — HOME CARE VISIT (OUTPATIENT)
Dept: HOSPICE | Facility: HOSPICE | Age: 87
End: 2022-10-25

## 2022-10-25 VITALS — DIASTOLIC BLOOD PRESSURE: 78 MMHG | RESPIRATION RATE: 20 BRPM | SYSTOLIC BLOOD PRESSURE: 132 MMHG | HEART RATE: 89 BPM

## 2022-10-25 PROCEDURE — 0651 HSPC ROUTINE HOME CARE

## 2022-10-25 PROCEDURE — G0299 HHS/HOSPICE OF RN EA 15 MIN: HCPCS

## 2022-10-25 NOTE — HOSPICE
Patient is eating 3 meals a day. She is now able to take herself to the bathroom. Patient's lungs are clear. Patient's oxygen saturations are 94% or higher on room air, writer advised patient to wear PRN.

## 2022-10-26 ENCOUNTER — HOME CARE VISIT (OUTPATIENT)
Dept: SCHEDULING | Facility: HOME HEALTH | Age: 87
End: 2022-10-26

## 2022-10-26 PROCEDURE — 0651 HSPC ROUTINE HOME CARE

## 2022-10-26 PROCEDURE — G0156 HHCP-SVS OF AIDE,EA 15 MIN: HCPCS

## 2022-10-27 ENCOUNTER — HOME CARE VISIT (OUTPATIENT)
Dept: SCHEDULING | Facility: HOME HEALTH | Age: 87
End: 2022-10-27

## 2022-10-27 PROCEDURE — 0651 HSPC ROUTINE HOME CARE

## 2022-10-27 PROCEDURE — G0156 HHCP-SVS OF AIDE,EA 15 MIN: HCPCS

## 2022-10-28 PROCEDURE — 0651 HSPC ROUTINE HOME CARE

## 2022-10-29 PROCEDURE — 0651 HSPC ROUTINE HOME CARE

## 2022-10-30 PROCEDURE — 0651 HSPC ROUTINE HOME CARE

## 2022-10-31 ENCOUNTER — HOME CARE VISIT (OUTPATIENT)
Dept: HOSPICE | Facility: HOSPICE | Age: 87
End: 2022-10-31

## 2022-10-31 ENCOUNTER — HOME CARE VISIT (OUTPATIENT)
Dept: SCHEDULING | Facility: HOME HEALTH | Age: 87
End: 2022-10-31

## 2022-10-31 PROCEDURE — G0156 HHCP-SVS OF AIDE,EA 15 MIN: HCPCS

## 2022-10-31 PROCEDURE — 0651 HSPC ROUTINE HOME CARE

## 2022-11-01 ENCOUNTER — HOME CARE VISIT (OUTPATIENT)
Dept: SCHEDULING | Facility: HOME HEALTH | Age: 87
End: 2022-11-01

## 2022-11-01 VITALS
OXYGEN SATURATION: 91 % | TEMPERATURE: 98.7 F | HEART RATE: 103 BPM | RESPIRATION RATE: 20 BRPM | DIASTOLIC BLOOD PRESSURE: 80 MMHG | SYSTOLIC BLOOD PRESSURE: 127 MMHG

## 2022-11-01 PROCEDURE — 0651 HSPC ROUTINE HOME CARE

## 2022-11-01 PROCEDURE — G0300 HHS/HOSPICE OF LPN EA 15 MIN: HCPCS

## 2022-11-01 NOTE — HOSPICE
routine visit. On arrival patient ambulated to bedroom. Patient noted to be slightly short of breath. Patient does have dry, unproductive cough, mostly during the night; using guaifenessin to assist with symptoms. Patient is only using supplemental oxygen at night. On assessment wheezes noted will refill albuterol.   Patient having some constipation difficulty; started using senna 4 days ago; will continue to use to help with bm.  supplies ordered: wipes, chux, xl diapers, body wash and lotion

## 2022-11-02 ENCOUNTER — HOME CARE VISIT (OUTPATIENT)
Dept: SCHEDULING | Facility: HOME HEALTH | Age: 87
End: 2022-11-02

## 2022-11-02 PROCEDURE — HOSPICE MEDICATION HC HH HOSPICE MEDICATION

## 2022-11-02 PROCEDURE — G0156 HHCP-SVS OF AIDE,EA 15 MIN: HCPCS

## 2022-11-02 PROCEDURE — 0651 HSPC ROUTINE HOME CARE

## 2022-11-03 ENCOUNTER — HOME CARE VISIT (OUTPATIENT)
Dept: HOSPICE | Facility: HOSPICE | Age: 87
End: 2022-11-03

## 2022-11-03 ENCOUNTER — HOME CARE VISIT (OUTPATIENT)
Dept: SCHEDULING | Facility: HOME HEALTH | Age: 87
End: 2022-11-03

## 2022-11-03 PROCEDURE — G0156 HHCP-SVS OF AIDE,EA 15 MIN: HCPCS

## 2022-11-03 PROCEDURE — 0651 HSPC ROUTINE HOME CARE

## 2022-11-04 ENCOUNTER — HOME CARE VISIT (OUTPATIENT)
Dept: SCHEDULING | Facility: HOME HEALTH | Age: 87
End: 2022-11-04

## 2022-11-04 PROCEDURE — G0155 HHCP-SVS OF CSW,EA 15 MIN: HCPCS

## 2022-11-04 PROCEDURE — 0651 HSPC ROUTINE HOME CARE

## 2022-11-05 PROCEDURE — 0651 HSPC ROUTINE HOME CARE

## 2022-11-05 NOTE — HOSPICE
LMSW arrived at the patient's home to complete a routine visit for [facility]. The LMSW was greeted at the front door by the patient's son Macy Dunne and his wife Shama was present. The home was clean and clutter free. The patient is a 79 y/o 24 [facility] female with a [facility] Dx. Interstitial lung disease. The patient was received sitting on her bed in her bedroom. Patient was very polite but understands very little [place] language. Patient appeared pleasant and comfortable. Patient gave thanks for this visit. Macy Dunne was the POC for this visit. Macy Acemichael reported that the patient now has a cold or a mild flu that his children had brought home from school. Macy Dunne stated the patient would really like to return home to [facility]. Macy Dunne is hoping that maybe in January or February the patient will be able to return home. Macy Dunne stated that he and his family are coping appropriately. Macy Dunne did not have any immediate needs.

## 2022-11-06 PROCEDURE — 0651 HSPC ROUTINE HOME CARE

## 2022-11-07 ENCOUNTER — HOME CARE VISIT (OUTPATIENT)
Dept: SCHEDULING | Facility: HOME HEALTH | Age: 87
End: 2022-11-07

## 2022-11-07 ENCOUNTER — HOME CARE VISIT (OUTPATIENT)
Dept: HOSPICE | Facility: HOSPICE | Age: 87
End: 2022-11-07

## 2022-11-07 VITALS — HEART RATE: 101 BPM | DIASTOLIC BLOOD PRESSURE: 90 MMHG | RESPIRATION RATE: 20 BRPM | SYSTOLIC BLOOD PRESSURE: 148 MMHG

## 2022-11-07 PROCEDURE — G0156 HHCP-SVS OF AIDE,EA 15 MIN: HCPCS

## 2022-11-07 PROCEDURE — G0299 HHS/HOSPICE OF RN EA 15 MIN: HCPCS

## 2022-11-07 PROCEDURE — HOSPICE MEDICATION HC HH HOSPICE MEDICATION

## 2022-11-07 PROCEDURE — 0651 HSPC ROUTINE HOME CARE

## 2022-11-07 NOTE — HOSPICE
Patient and her family have a cold. Patient has some wheezing in her upper back lobes, she is wearing oxygen PRN. Patient has a productive clear cough. Patient is taking Robitussin and nebulizer treatment as needed. Patient has some yeast in her janet abdomen folds, Nystatin powder BID. Writer reviewed care plan and medications with family and ask them to call with any questions or concerns.

## 2022-11-08 PROCEDURE — 0651 HSPC ROUTINE HOME CARE

## 2022-11-09 ENCOUNTER — HOME CARE VISIT (OUTPATIENT)
Dept: HOSPICE | Facility: HOSPICE | Age: 87
End: 2022-11-09

## 2022-11-09 ENCOUNTER — HOME CARE VISIT (OUTPATIENT)
Dept: SCHEDULING | Facility: HOME HEALTH | Age: 87
End: 2022-11-09

## 2022-11-09 PROCEDURE — G0156 HHCP-SVS OF AIDE,EA 15 MIN: HCPCS

## 2022-11-09 PROCEDURE — 0651 HSPC ROUTINE HOME CARE

## 2022-11-10 ENCOUNTER — HOME CARE VISIT (OUTPATIENT)
Dept: SCHEDULING | Facility: HOME HEALTH | Age: 87
End: 2022-11-10

## 2022-11-10 PROCEDURE — G0156 HHCP-SVS OF AIDE,EA 15 MIN: HCPCS

## 2022-11-11 ENCOUNTER — HOME CARE VISIT (OUTPATIENT)
Dept: HOSPICE | Facility: HOSPICE | Age: 87
End: 2022-11-11

## 2022-11-11 VITALS
RESPIRATION RATE: 18 BRPM | OXYGEN SATURATION: 98 % | HEART RATE: 88 BPM | DIASTOLIC BLOOD PRESSURE: 78 MMHG | SYSTOLIC BLOOD PRESSURE: 114 MMHG

## 2022-11-11 PROCEDURE — G0299 HHS/HOSPICE OF RN EA 15 MIN: HCPCS

## 2022-11-11 NOTE — HOSPICE
PRN visit for call that pt has had congestion, worsening cough and SOB for several days. Many of the family are reported to be sick with cold/flu. Symptoms began Sunday, worsening. Lungs with rhonchi, pt with mild SOB, saturation 98% on 2 liters. She states sputum is thick and biege/yellow. She has been using duonebs and robitussin with little relief. She has not slept the past few nights due to cough. Call to D. Family agrees with plan to treat with antibiotic and steroid for 5 days. Meds sent electronically to [facility] on [address] Refilled duoneb for delivery Monday at son's request ([employer] to deliver). Advised rest and increased fluids. Supplies and meds on hand. Family will call with any further needs. verified at 1520 that scripts were received at pharmacy and would be ready by 1600. Left message with Jake Cintron regarding .

## 2022-11-14 ENCOUNTER — HOME CARE VISIT (OUTPATIENT)
Dept: SCHEDULING | Facility: HOME HEALTH | Age: 87
End: 2022-11-14

## 2022-11-14 PROCEDURE — G0156 HHCP-SVS OF AIDE,EA 15 MIN: HCPCS

## 2022-11-16 ENCOUNTER — HOME CARE VISIT (OUTPATIENT)
Dept: SCHEDULING | Facility: HOME HEALTH | Age: 87
End: 2022-11-16

## 2022-11-16 ENCOUNTER — HOME CARE VISIT (OUTPATIENT)
Dept: HOSPICE | Facility: HOSPICE | Age: 87
End: 2022-11-16

## 2022-11-16 VITALS — RESPIRATION RATE: 20 BRPM | DIASTOLIC BLOOD PRESSURE: 76 MMHG | SYSTOLIC BLOOD PRESSURE: 132 MMHG | HEART RATE: 99 BPM

## 2022-11-16 PROCEDURE — G0299 HHS/HOSPICE OF RN EA 15 MIN: HCPCS

## 2022-11-16 PROCEDURE — G0156 HHCP-SVS OF AIDE,EA 15 MIN: HCPCS

## 2022-11-16 NOTE — HOSPICE
Patient stated she is feeling better, my cough is almost gone, nothing is coming up. Lungs are clear/diminished except right upper lobe wheezing noted. Patient denies fever. Patient is wearing oxygen as needed. Family reports patient occassionally has heart palpations. Writer ask family to call with any questions or concerns.

## 2022-11-17 ENCOUNTER — HOME CARE VISIT (OUTPATIENT)
Dept: SCHEDULING | Facility: HOME HEALTH | Age: 87
End: 2022-11-17

## 2022-11-17 PROCEDURE — G0156 HHCP-SVS OF AIDE,EA 15 MIN: HCPCS

## 2022-11-21 ENCOUNTER — HOME CARE VISIT (OUTPATIENT)
Dept: SCHEDULING | Facility: HOME HEALTH | Age: 87
End: 2022-11-21

## 2022-11-21 ENCOUNTER — HOME CARE VISIT (OUTPATIENT)
Dept: HOSPICE | Facility: HOSPICE | Age: 87
End: 2022-11-21

## 2022-11-21 PROCEDURE — G0156 HHCP-SVS OF AIDE,EA 15 MIN: HCPCS

## 2022-11-21 PROCEDURE — G0299 HHS/HOSPICE OF RN EA 15 MIN: HCPCS

## 2022-11-22 ENCOUNTER — HOME CARE VISIT (OUTPATIENT)
Dept: SCHEDULING | Facility: HOME HEALTH | Age: 87
End: 2022-11-22

## 2022-11-22 PROCEDURE — G0156 HHCP-SVS OF AIDE,EA 15 MIN: HCPCS

## 2022-11-23 ENCOUNTER — HOME CARE VISIT (OUTPATIENT)
Dept: SCHEDULING | Facility: HOME HEALTH | Age: 87
End: 2022-11-23

## 2022-11-23 VITALS
DIASTOLIC BLOOD PRESSURE: 80 MMHG | OXYGEN SATURATION: 93 % | RESPIRATION RATE: 20 BRPM | SYSTOLIC BLOOD PRESSURE: 138 MMHG | HEART RATE: 118 BPM

## 2022-11-23 PROCEDURE — G0156 HHCP-SVS OF AIDE,EA 15 MIN: HCPCS

## 2022-11-23 NOTE — HOSPICE
Patient received ambulating from bathroom. She is alert and oriented x4. Patient's daughter in law and son present. Pt complains of SOB more at night and occassional wheezing. She uses oxygen via nasal cannula at night and as needed during the day. Daughter in law requested longer oxygen tubing for the patient to walk to the bathroom. Lung sounds are coarse and diminished. Nebulizer is used 2 times per day. VSS. Skin intact. Pt denies pain. Guafenisin ordered via [employer]. Oxygen tubing ordered through [employer]. Pt and family encouraged to call with any questions or concerns.

## 2022-11-28 ENCOUNTER — HOME CARE VISIT (OUTPATIENT)
Dept: SCHEDULING | Facility: HOME HEALTH | Age: 87
End: 2022-11-28

## 2022-11-28 PROCEDURE — G0156 HHCP-SVS OF AIDE,EA 15 MIN: HCPCS

## 2022-11-29 ENCOUNTER — HOME CARE VISIT (OUTPATIENT)
Dept: SCHEDULING | Facility: HOME HEALTH | Age: 87
End: 2022-11-29

## 2022-11-29 PROCEDURE — G0299 HHS/HOSPICE OF RN EA 15 MIN: HCPCS

## 2022-11-29 PROCEDURE — G0156 HHCP-SVS OF AIDE,EA 15 MIN: HCPCS

## 2022-11-30 ENCOUNTER — HOME CARE VISIT (OUTPATIENT)
Dept: HOSPICE | Facility: HOSPICE | Age: 87
End: 2022-11-30

## 2022-12-01 ENCOUNTER — HOME CARE VISIT (OUTPATIENT)
Dept: SCHEDULING | Facility: HOME HEALTH | Age: 87
End: 2022-12-01

## 2022-12-01 VITALS
HEART RATE: 90 BPM | DIASTOLIC BLOOD PRESSURE: 70 MMHG | OXYGEN SATURATION: 94 % | RESPIRATION RATE: 18 BRPM | TEMPERATURE: 97.6 F | SYSTOLIC BLOOD PRESSURE: 120 MMHG

## 2022-12-01 PROCEDURE — G0156 HHCP-SVS OF AIDE,EA 15 MIN: HCPCS

## 2022-12-01 NOTE — HOSPICE
Patient found A&Ox4 with son Lonnie Reyes present. Breath sounds are improved. Patient is calm and comfortable. Lonnie Reyes asked if nebulizer should be used on a scheduled or as needed basis. Referred to med order and recommended use every 4 hours as needed for symptoms of wheezing, cough or shortness of breath. No other changes or needs. Reminded Lonnie Reyes to call with any changes or needs.

## 2022-12-05 ENCOUNTER — HOME CARE VISIT (OUTPATIENT)
Dept: SCHEDULING | Facility: HOME HEALTH | Age: 87
End: 2022-12-05

## 2022-12-05 PROCEDURE — G0156 HHCP-SVS OF AIDE,EA 15 MIN: HCPCS

## 2022-12-06 ENCOUNTER — HOME CARE VISIT (OUTPATIENT)
Dept: HOSPICE | Facility: HOSPICE | Age: 87
End: 2022-12-06

## 2022-12-06 ENCOUNTER — HOME CARE VISIT (OUTPATIENT)
Dept: SCHEDULING | Facility: HOME HEALTH | Age: 87
End: 2022-12-06

## 2022-12-06 VITALS
OXYGEN SATURATION: 94 % | DIASTOLIC BLOOD PRESSURE: 93 MMHG | HEART RATE: 89 BPM | RESPIRATION RATE: 18 BRPM | SYSTOLIC BLOOD PRESSURE: 137 MMHG

## 2022-12-06 PROCEDURE — G0299 HHS/HOSPICE OF RN EA 15 MIN: HCPCS

## 2022-12-06 PROCEDURE — G0156 HHCP-SVS OF AIDE,EA 15 MIN: HCPCS

## 2022-12-06 NOTE — HOSPICE
Patient received in bedroom. She is alert and oriented x3. Upon assessment, crackles heard in lower bases. Patient states she has SOB when ambulating to living room (10-20 feet). She uses oxygen as needed and at night on 2L via nasal cannula. +1 pitting edema to left leg and +2 pitting edema to the her right. Ángela Bennett NP notified and will visit pt to assess. Bilateral knee pain when standing or ambulating. Patient does not want take pain medications, diclofenac ordered. VSS. Skin intact. Supplies ordered through [employer]. Medication refills sent to [facility]. Encouraged to call with any questions or concerns.

## 2022-12-07 NOTE — HOSPICE
missed visit:  call initiated to provide comfort and spiritual guidance to the pt, family and CG as they make their journey towards EOL. There was no answer so a message was left offering spiritual support and requesting a call back if a visit is desired. No spiritual care needs expressed by patient or CG, nor have they reached out for support. Patient and caregiver decline spiritual care services at this time.

## 2022-12-08 ENCOUNTER — HOME CARE VISIT (OUTPATIENT)
Dept: SCHEDULING | Facility: HOME HEALTH | Age: 87
End: 2022-12-08

## 2022-12-08 PROCEDURE — G0156 HHCP-SVS OF AIDE,EA 15 MIN: HCPCS

## 2022-12-12 ENCOUNTER — HOME CARE VISIT (OUTPATIENT)
Dept: SCHEDULING | Facility: HOME HEALTH | Age: 87
End: 2022-12-12

## 2022-12-12 PROCEDURE — G0156 HHCP-SVS OF AIDE,EA 15 MIN: HCPCS

## 2022-12-13 ENCOUNTER — HOME CARE VISIT (OUTPATIENT)
Dept: HOSPICE | Facility: HOSPICE | Age: 87
End: 2022-12-13

## 2022-12-13 ENCOUNTER — HOME CARE VISIT (OUTPATIENT)
Dept: SCHEDULING | Facility: HOME HEALTH | Age: 87
End: 2022-12-13

## 2022-12-13 PROCEDURE — G0299 HHS/HOSPICE OF RN EA 15 MIN: HCPCS

## 2022-12-13 PROCEDURE — G0156 HHCP-SVS OF AIDE,EA 15 MIN: HCPCS

## 2022-12-14 ENCOUNTER — HOME CARE VISIT (OUTPATIENT)
Dept: SCHEDULING | Facility: HOME HEALTH | Age: 87
End: 2022-12-14

## 2022-12-14 VITALS
SYSTOLIC BLOOD PRESSURE: 148 MMHG | RESPIRATION RATE: 18 BRPM | HEART RATE: 90 BPM | DIASTOLIC BLOOD PRESSURE: 89 MMHG | OXYGEN SATURATION: 94 %

## 2022-12-14 PROCEDURE — G0156 HHCP-SVS OF AIDE,EA 15 MIN: HCPCS

## 2022-12-14 NOTE — HOSPICE
Patient received ambulating to bedroom with cane. Her daugher in law is present. The patient is alert and oriented x3. She denies changes since last visit. The patient states she uses oxygen at night and as needed. Her lungs are clear and SPO2 94% on room air. VSS. Skin intact. She denies pain at this time though she has arthritic pain in her knees on occassion. Diclofenac available. Medications reviewed. Supplies ordered for delivery. Patient and DIL encouraged to call with any questions or concerns.

## 2022-12-19 ENCOUNTER — HOME CARE VISIT (OUTPATIENT)
Dept: SCHEDULING | Facility: HOME HEALTH | Age: 87
End: 2022-12-19

## 2022-12-19 PROCEDURE — G0156 HHCP-SVS OF AIDE,EA 15 MIN: HCPCS

## 2022-12-20 ENCOUNTER — HOME CARE VISIT (OUTPATIENT)
Dept: SCHEDULING | Facility: HOME HEALTH | Age: 87
End: 2022-12-20

## 2022-12-20 ENCOUNTER — HOME CARE VISIT (OUTPATIENT)
Dept: HOSPICE | Facility: HOSPICE | Age: 87
End: 2022-12-20

## 2022-12-20 PROCEDURE — G0299 HHS/HOSPICE OF RN EA 15 MIN: HCPCS

## 2022-12-20 PROCEDURE — G0155 HHCP-SVS OF CSW,EA 15 MIN: HCPCS

## 2022-12-20 PROCEDURE — G0156 HHCP-SVS OF AIDE,EA 15 MIN: HCPCS

## 2022-12-20 NOTE — HOSPICE
LMSW arrived at the patient's home to complete a routine visit for [facility]. The LMSW was greeted at the front door by the patient's son Ulisses Patel and his wife Shama was present. The home was clean and clutter free. The patient is a 81 y/o 24 [facility] female with a [facility] Dx. Interstitial lung disease. The patient was received sitting on her bed in her bedroom. Patient was very polite but understands very little [place] language. Patient appeared pleasant and comfortable. Patient gave thanks for this visit. Ulisses Patel was the POC for this visit. Ulisses Patel reported that the patient wants to return home to [facility]. Ulisses Patel stated that one of his sisters will be visiting in late March and will stay with them for a week. At this time Ulisses Patel, his sister, and the patient will have a conversation about returning home. Ulisses Patel reported that the patient does well with taking the inhaler 2xD but stated that the patient gets anxious at night and wants to use the O2. Ulisses Patel stated that he and his family are coping appropriately. Ulisses Patel did not have any immediate needs.

## 2022-12-21 ENCOUNTER — HOME CARE VISIT (OUTPATIENT)
Dept: SCHEDULING | Facility: HOME HEALTH | Age: 87
End: 2022-12-21

## 2022-12-21 VITALS
DIASTOLIC BLOOD PRESSURE: 95 MMHG | SYSTOLIC BLOOD PRESSURE: 137 MMHG | OXYGEN SATURATION: 98 % | HEART RATE: 95 BPM | RESPIRATION RATE: 16 BRPM

## 2022-12-21 PROCEDURE — G0156 HHCP-SVS OF AIDE,EA 15 MIN: HCPCS

## 2022-12-21 NOTE — HOSPICE
Nelia Clark  79 yo female  Hospice Dx: Interstitial Lung Disease  Comprehensive Assessment: 12/13/22  Problem: Ms. Rhonda Hanks is going into her 2nd benefit period with a diagnosis of interstitial lung disease. She remains ambulatory with a cane. She is continent of bowel and bladder. She uses oxygen on 2L via NC at night when sleeping. She uses a nebulizer 2 times per day to help with coughing. Diclofenac and Tylenol for arthritic pain in her knees. Plan for next 2 weeks: Continue to monitor for signs and symptoms of decline. LCD:  Patients will be considered to be in the terminal stage of pulmonary disease (life expectancy of six months or less) if they meet the following criteria. The criteria refer to patients with various forms of advanced pulmonary disease who eventually follow a final common pathway for end stage pulmonary disease. (1 and 2 should be present. Documentation of 3, 4, and 5, will lend supporting documentation.):    ________  1. Severe chronic lung disease as documented by both a and b:                          ________  a. Disabling dyspnea at rest, poorly or unresponsive to bronchodilators,                                                   resulting in decreased functional capacity, e.g., bed to chair existence,                                                   fatigue, and cough: (Documentation of Forced Expiratory Volume in One                                                   Second (FEV1), after bronchodilator, less than 30% of predicted is objective                                                                  evidence for disabling dyspnea, but is not necessary to obtain.)                          ________  b.  Progression of end stage pulmonary disease, as evidenced by increasing visits                                                   to the emergency department or hospitalizations for pulmonary infections                                                   and/or respiratory failure or increasing physician home visits prior to initial                                                   certification. (Documentation of serial decrease of FEV1>40 ml/year is                                                                     objective evidence for disease progression, but is not necessary to obtain.)  ________  2. Hypoxemia at rest on room air, as evidenced by pO2 ? 55 mmHg; or oxygen saturation                           ? 88%, determined either by arterial blood gases or oxygen saturation monitors; (These                            values may be obtained from recent hospital records. ) OR Hypercapnia, as evidenced by                            pCO2 ?50 mmHg. (This value may be obtained from recent [within 3 months] hospital                            records.)  ________  3. Right heart failure (RHF) secondary to pulmonary disease (Cor pulmonale) (e.g., not                           secondary to left heart disease or valvulopathy). ________  4. Unintentional progressive weight loss of greater than 10% of body weight over the                            preceding six months.  ___X_____  5. Resting tachycardia >100/min.

## 2022-12-27 ENCOUNTER — HOME CARE VISIT (OUTPATIENT)
Dept: SCHEDULING | Facility: HOME HEALTH | Age: 87
End: 2022-12-27

## 2022-12-27 ENCOUNTER — HOME CARE VISIT (OUTPATIENT)
Dept: HOSPICE | Facility: HOSPICE | Age: 87
End: 2022-12-27

## 2022-12-27 PROCEDURE — G0156 HHCP-SVS OF AIDE,EA 15 MIN: HCPCS

## 2022-12-28 ENCOUNTER — HOME CARE VISIT (OUTPATIENT)
Dept: HOSPICE | Facility: HOSPICE | Age: 87
End: 2022-12-28

## 2022-12-28 ENCOUNTER — HOME CARE VISIT (OUTPATIENT)
Dept: SCHEDULING | Facility: HOME HEALTH | Age: 87
End: 2022-12-28

## 2022-12-28 PROCEDURE — G0156 HHCP-SVS OF AIDE,EA 15 MIN: HCPCS

## 2022-12-29 ENCOUNTER — HOME CARE VISIT (OUTPATIENT)
Dept: SCHEDULING | Facility: HOME HEALTH | Age: 87
End: 2022-12-29

## 2022-12-29 VITALS
HEART RATE: 89 BPM | SYSTOLIC BLOOD PRESSURE: 140 MMHG | DIASTOLIC BLOOD PRESSURE: 75 MMHG | TEMPERATURE: 97.8 F | RESPIRATION RATE: 15 BRPM

## 2022-12-29 PROCEDURE — G0156 HHCP-SVS OF AIDE,EA 15 MIN: HCPCS

## 2022-12-29 PROCEDURE — G0299 HHS/HOSPICE OF RN EA 15 MIN: HCPCS

## 2022-12-29 NOTE — HOSPICE
Routine SNV made. Patient's DIL present for visit. Patient sitting up on side of bed. Patient denied any pain besides her normal arthritic pain in her knees, she denied shortness of breath. She stated that her Bms have been regular. Patient stated that she has an occasional dry cough. Patient stated that she has been using nebulizer BID. UPon assessment, patient smiling, respirations even and unlabored, abdomen soft, bowel sounds active, +1 edema noted in lower extremities. Medications reconciled, duo nebs and robitussin ordered via [employer]. No supplies needed. Reminded DIL to call hospice number with any needs or concerns.

## 2023-01-02 ENCOUNTER — HOME CARE VISIT (OUTPATIENT)
Dept: SCHEDULING | Facility: HOME HEALTH | Age: 88
End: 2023-01-02

## 2023-01-02 PROCEDURE — G0156 HHCP-SVS OF AIDE,EA 15 MIN: HCPCS

## 2023-01-03 ENCOUNTER — HOME CARE VISIT (OUTPATIENT)
Dept: SCHEDULING | Facility: HOME HEALTH | Age: 88
End: 2023-01-03

## 2023-01-03 PROCEDURE — G0299 HHS/HOSPICE OF RN EA 15 MIN: HCPCS

## 2023-01-04 ENCOUNTER — HOME CARE VISIT (OUTPATIENT)
Dept: SCHEDULING | Facility: HOME HEALTH | Age: 88
End: 2023-01-04

## 2023-01-04 VITALS
HEART RATE: 80 BPM | OXYGEN SATURATION: 94 % | SYSTOLIC BLOOD PRESSURE: 145 MMHG | DIASTOLIC BLOOD PRESSURE: 95 MMHG | TEMPERATURE: 98.4 F | RESPIRATION RATE: 16 BRPM

## 2023-01-04 PROCEDURE — G0156 HHCP-SVS OF AIDE,EA 15 MIN: HCPCS

## 2023-01-04 NOTE — HOSPICE
Patient found A&Ox4 in bedroom with son Viviane Boast present. Patient denies pain, other than occasional joint pain which is alleviated with topical ointment. Vital signs WNL. Patient has no changes. She is using her nebulizer twice daily and reports breathing has been good. Lung sounds clear in upper areas, diminished in lower, with no crackles nor rhonchi heard. Cough is managed with cough medicine. No medication nor supply needs. Reminded patient and son to call with any changes or needs.

## 2023-01-05 ENCOUNTER — HOME CARE VISIT (OUTPATIENT)
Dept: SCHEDULING | Facility: HOME HEALTH | Age: 88
End: 2023-01-05

## 2023-01-05 PROCEDURE — G0156 HHCP-SVS OF AIDE,EA 15 MIN: HCPCS

## 2023-01-09 ENCOUNTER — HOME CARE VISIT (OUTPATIENT)
Dept: SCHEDULING | Facility: HOME HEALTH | Age: 88
End: 2023-01-09

## 2023-01-09 PROCEDURE — G0156 HHCP-SVS OF AIDE,EA 15 MIN: HCPCS

## 2023-01-10 ENCOUNTER — HOME CARE VISIT (OUTPATIENT)
Dept: SCHEDULING | Facility: HOME HEALTH | Age: 88
End: 2023-01-10

## 2023-01-10 PROCEDURE — G0299 HHS/HOSPICE OF RN EA 15 MIN: HCPCS

## 2023-01-11 ENCOUNTER — HOME CARE VISIT (OUTPATIENT)
Dept: SCHEDULING | Facility: HOME HEALTH | Age: 88
End: 2023-01-11

## 2023-01-11 VITALS
RESPIRATION RATE: 20 BRPM | HEART RATE: 94 BPM | DIASTOLIC BLOOD PRESSURE: 72 MMHG | SYSTOLIC BLOOD PRESSURE: 115 MMHG | OXYGEN SATURATION: 95 %

## 2023-01-11 PROCEDURE — G0156 HHCP-SVS OF AIDE,EA 15 MIN: HCPCS

## 2023-01-11 NOTE — HOSPICE
Received pt sitting on the side of her hospital bed. Daughter in law present. She is alert and oriented x3. Patient states she has had a cold with cough and congestion. She is currently taking guafenesin for cough and nebulizer treatments 2 times daily. She states they have helped. She states she occassionally has arthritic pain in which Tylenol is ordered for as well as diclofenac for her knees. VSS. Skin intact. Pt denies current pain. Lungs are clear. SPO2 95% on room air. The patient uses oxygen on 2L via NC at night. She is ambulatory with a cane. Medication refills ordered through [employer] and supplies ordered through [employer]. DIL and patient encouraged to call with any questions or concerns.

## 2023-01-12 ENCOUNTER — HOME CARE VISIT (OUTPATIENT)
Dept: SCHEDULING | Facility: HOME HEALTH | Age: 88
End: 2023-01-12

## 2023-01-12 PROCEDURE — G0156 HHCP-SVS OF AIDE,EA 15 MIN: HCPCS

## 2023-01-16 ENCOUNTER — HOME CARE VISIT (OUTPATIENT)
Dept: SCHEDULING | Facility: HOME HEALTH | Age: 88
End: 2023-01-16

## 2023-01-16 PROCEDURE — G0156 HHCP-SVS OF AIDE,EA 15 MIN: HCPCS

## 2023-01-17 ENCOUNTER — HOME CARE VISIT (OUTPATIENT)
Dept: SCHEDULING | Facility: HOME HEALTH | Age: 88
End: 2023-01-17

## 2023-01-17 PROCEDURE — G0299 HHS/HOSPICE OF RN EA 15 MIN: HCPCS

## 2023-01-18 ENCOUNTER — HOME CARE VISIT (OUTPATIENT)
Dept: SCHEDULING | Facility: HOME HEALTH | Age: 88
End: 2023-01-18

## 2023-01-18 PROCEDURE — G0156 HHCP-SVS OF AIDE,EA 15 MIN: HCPCS

## 2023-01-19 ENCOUNTER — HOME CARE VISIT (OUTPATIENT)
Dept: SCHEDULING | Facility: HOME HEALTH | Age: 88
End: 2023-01-19

## 2023-01-19 PROCEDURE — G0156 HHCP-SVS OF AIDE,EA 15 MIN: HCPCS

## 2023-01-23 ENCOUNTER — HOME CARE VISIT (OUTPATIENT)
Dept: HOSPICE | Facility: HOSPICE | Age: 88
End: 2023-01-23

## 2023-01-23 ENCOUNTER — HOME CARE VISIT (OUTPATIENT)
Dept: SCHEDULING | Facility: HOME HEALTH | Age: 88
End: 2023-01-23

## 2023-01-23 VITALS
RESPIRATION RATE: 18 BRPM | SYSTOLIC BLOOD PRESSURE: 130 MMHG | TEMPERATURE: 98.2 F | OXYGEN SATURATION: 93 % | DIASTOLIC BLOOD PRESSURE: 75 MMHG | HEART RATE: 92 BPM

## 2023-01-23 PROCEDURE — G0156 HHCP-SVS OF AIDE,EA 15 MIN: HCPCS

## 2023-01-23 NOTE — HOSPICE
Patient found A&Ox4 with daughter in law and son present. Vital signs WNL, she denies pain. Skin is intact. No needs nor changes reported. Reminded all to call as needed.

## 2023-01-25 ENCOUNTER — HOME CARE VISIT (OUTPATIENT)
Dept: SCHEDULING | Facility: HOME HEALTH | Age: 88
End: 2023-01-25

## 2023-01-25 PROCEDURE — G0299 HHS/HOSPICE OF RN EA 15 MIN: HCPCS

## 2023-01-25 PROCEDURE — G0156 HHCP-SVS OF AIDE,EA 15 MIN: HCPCS

## 2023-01-26 ENCOUNTER — HOME CARE VISIT (OUTPATIENT)
Dept: SCHEDULING | Facility: HOME HEALTH | Age: 88
End: 2023-01-26

## 2023-01-26 PROCEDURE — G0156 HHCP-SVS OF AIDE,EA 15 MIN: HCPCS

## 2023-01-30 ENCOUNTER — HOME CARE VISIT (OUTPATIENT)
Dept: SCHEDULING | Facility: HOME HEALTH | Age: 88
End: 2023-01-30

## 2023-01-30 VITALS
TEMPERATURE: 98 F | RESPIRATION RATE: 18 BRPM | DIASTOLIC BLOOD PRESSURE: 70 MMHG | OXYGEN SATURATION: 96 % | SYSTOLIC BLOOD PRESSURE: 120 MMHG | HEART RATE: 100 BPM

## 2023-01-30 PROCEDURE — G0155 HHCP-SVS OF CSW,EA 15 MIN: HCPCS

## 2023-01-30 PROCEDURE — G0156 HHCP-SVS OF AIDE,EA 15 MIN: HCPCS

## 2023-01-30 NOTE — HOSPICE
LMSW arrived at the patient's home to complete a routine visit for [facility]. The LMSW was greeted at the front door by the patient's son Nancy Vallejo and his wife Shama was present. The home was clean and clutter free. The patient is a 79 y/o 24 [facility] female with a [facility] Dx. Interstitial lung disease. The patient was received sitting on her bed in her bedroom. Patient was very polite but understands very little [place] language. Patient appeared pleasant and comfortable. Patient gave thanks for this visit. Elishaevelio Genevieve was the POC for this visit. Nancy Vallejo reported that the patient wants to return home to [facility]. Nancy Vallejo stated that one of his sisters will be visiting in March and will stay with them for a week. At this time Nancy Vallejo, his sister, and the patient will have a conversation about returning home. Nancy Vallejo reported that the patient does well with taking the inhaler 3xD and stated that the patient is using the O2 at night intermittently. Nancy Vallejo reported that the patient's appetite is good, patient can sleep through most of the night. Patient's mood is good and enjoys time with her Grandchildren. Nancy Vallejo stated that he and his family are coping appropriately. Nancy Vallejo did not have any immediate needs.

## 2023-01-30 NOTE — HOSPICE
Patient found A&Ox4 with son and daughter in law present. She denies pain, vital signs WNL. Discomfort from arthrititis is controlled with Tylenol. She uses her nebulizer twice daily and rarely uses supplemental oxygen. Skin is intact. No changes nor needs. Reminded all to call as needed.

## 2023-02-01 ENCOUNTER — HOME CARE VISIT (OUTPATIENT)
Dept: SCHEDULING | Facility: HOME HEALTH | Age: 88
End: 2023-02-01

## 2023-02-01 PROCEDURE — G0156 HHCP-SVS OF AIDE,EA 15 MIN: HCPCS

## 2023-02-02 ENCOUNTER — HOME CARE VISIT (OUTPATIENT)
Dept: SCHEDULING | Facility: HOME HEALTH | Age: 88
End: 2023-02-02

## 2023-02-02 PROCEDURE — G0156 HHCP-SVS OF AIDE,EA 15 MIN: HCPCS

## 2023-02-06 ENCOUNTER — HOME CARE VISIT (OUTPATIENT)
Dept: SCHEDULING | Facility: HOME HEALTH | Age: 88
End: 2023-02-06

## 2023-02-06 PROCEDURE — G0156 HHCP-SVS OF AIDE,EA 15 MIN: HCPCS

## 2023-02-07 ENCOUNTER — HOME CARE VISIT (OUTPATIENT)
Dept: HOSPICE | Facility: HOSPICE | Age: 88
End: 2023-02-07

## 2023-02-07 ENCOUNTER — HOME CARE VISIT (OUTPATIENT)
Dept: SCHEDULING | Facility: HOME HEALTH | Age: 88
End: 2023-02-07

## 2023-02-07 VITALS
DIASTOLIC BLOOD PRESSURE: 94 MMHG | SYSTOLIC BLOOD PRESSURE: 140 MMHG | OXYGEN SATURATION: 95 % | HEART RATE: 100 BPM | RESPIRATION RATE: 16 BRPM

## 2023-02-07 PROCEDURE — G0299 HHS/HOSPICE OF RN EA 15 MIN: HCPCS

## 2023-02-07 NOTE — HOSPICE
Joint visit with Alexi Valdez RN. Patient recieved sitting on the side of her hospital bed. She is alert and oriented x4 and states she is \"feeling better\" and her cough is gone since using the nebulizer 2x per day. She states she has occassional arthritic pain in her knees but Diclofenac cream is helping. VSS. Skin is intact. She has BLE edema without pitting that resolves when she elevates her feet. Per her DIL, she has SOB when ambulating but only uses oxygen \"sometimes\" at night. SPO2 95% on room air and lungs are clear. Medications reviewed. Lorazepam oral solution . RN discarded in front of Alexi Valdez and daughter in law. Replacement ordered for delivery through [employer]. Patient requests a new mattress as her is sinking in the middle. Joerns order to be placed. DIL and patient encouraged to call with any questions or concerns.

## 2023-02-08 ENCOUNTER — HOME CARE VISIT (OUTPATIENT)
Dept: SCHEDULING | Facility: HOME HEALTH | Age: 88
End: 2023-02-08

## 2023-02-08 PROCEDURE — G0156 HHCP-SVS OF AIDE,EA 15 MIN: HCPCS

## 2023-02-09 ENCOUNTER — HOME CARE VISIT (OUTPATIENT)
Dept: SCHEDULING | Facility: HOME HEALTH | Age: 88
End: 2023-02-09

## 2023-02-09 PROCEDURE — G0156 HHCP-SVS OF AIDE,EA 15 MIN: HCPCS

## 2023-02-13 ENCOUNTER — HOME CARE VISIT (OUTPATIENT)
Dept: SCHEDULING | Facility: HOME HEALTH | Age: 88
End: 2023-02-13

## 2023-02-13 PROCEDURE — G0156 HHCP-SVS OF AIDE,EA 15 MIN: HCPCS

## 2023-02-14 ENCOUNTER — HOME CARE VISIT (OUTPATIENT)
Dept: SCHEDULING | Facility: HOME HEALTH | Age: 88
End: 2023-02-14

## 2023-02-14 PROCEDURE — G0299 HHS/HOSPICE OF RN EA 15 MIN: HCPCS

## 2023-02-15 ENCOUNTER — HOME CARE VISIT (OUTPATIENT)
Dept: SCHEDULING | Facility: HOME HEALTH | Age: 88
End: 2023-02-15

## 2023-02-15 VITALS
OXYGEN SATURATION: 96 % | TEMPERATURE: 98.1 F | RESPIRATION RATE: 18 BRPM | SYSTOLIC BLOOD PRESSURE: 115 MMHG | DIASTOLIC BLOOD PRESSURE: 75 MMHG | HEART RATE: 92 BPM

## 2023-02-15 PROCEDURE — G0156 HHCP-SVS OF AIDE,EA 15 MIN: HCPCS

## 2023-02-15 NOTE — HOSPICE
Patient A&Ox4 with daughter in law Shama present. Patient has intermittent dry cough which she treats with lozenges, declines cough suppressant medication. Hospital bed mattress has indentation - replacement mattress ordered from [facility]. Skin is intact, BLE edema persists. No adventitious lung sounds. Appetite, intake, output and sleep all reported acceptable. No medication nor suply needs. Encouraged patient and caregiver to call with any changes or needs.

## 2023-02-16 ENCOUNTER — HOME CARE VISIT (OUTPATIENT)
Dept: SCHEDULING | Facility: HOME HEALTH | Age: 88
End: 2023-02-16

## 2023-02-16 PROCEDURE — G0156 HHCP-SVS OF AIDE,EA 15 MIN: HCPCS

## 2023-02-21 ENCOUNTER — HOME CARE VISIT (OUTPATIENT)
Dept: HOSPICE | Facility: HOSPICE | Age: 88
End: 2023-02-21

## 2023-02-21 ENCOUNTER — HOME CARE VISIT (OUTPATIENT)
Dept: SCHEDULING | Facility: HOME HEALTH | Age: 88
End: 2023-02-21

## 2023-02-21 PROCEDURE — G0299 HHS/HOSPICE OF RN EA 15 MIN: HCPCS

## 2023-02-22 VITALS
SYSTOLIC BLOOD PRESSURE: 140 MMHG | DIASTOLIC BLOOD PRESSURE: 85 MMHG | RESPIRATION RATE: 16 BRPM | OXYGEN SATURATION: 97 % | TEMPERATURE: 98.1 F | HEART RATE: 80 BPM

## 2023-02-22 NOTE — HOSPICE
Patient A&Ox4 with son, Aure Ayala and daughter, Shama present. She denies pain, vital signs WNL. Skin is intact. No changes reported. Replacement foam mattress topper is already severyl compressed. This RN recommended removal of topper. Aure Ayala reports they will try this. No needs at this time, encouraged all to call as needed.

## 2023-02-23 ENCOUNTER — HOME CARE VISIT (OUTPATIENT)
Dept: SCHEDULING | Facility: HOME HEALTH | Age: 88
End: 2023-02-23

## 2023-02-23 PROCEDURE — G0156 HHCP-SVS OF AIDE,EA 15 MIN: HCPCS

## 2023-02-27 ENCOUNTER — HOME CARE VISIT (OUTPATIENT)
Dept: SCHEDULING | Facility: HOME HEALTH | Age: 88
End: 2023-02-27

## 2023-02-27 PROCEDURE — G0156 HHCP-SVS OF AIDE,EA 15 MIN: HCPCS

## 2023-02-28 ENCOUNTER — HOME CARE VISIT (OUTPATIENT)
Dept: SCHEDULING | Facility: HOME HEALTH | Age: 88
End: 2023-02-28

## 2023-02-28 PROCEDURE — G0299 HHS/HOSPICE OF RN EA 15 MIN: HCPCS

## 2023-02-28 PROCEDURE — G0155 HHCP-SVS OF CSW,EA 15 MIN: HCPCS

## 2023-02-28 NOTE — HOSPICE
LMSW arrived at the patient's home to complete a routine visit for [facility]. The LMSW was greeted at the front door by the patient's son Teresa Sage and his wife Shama was present. The home was clean and clutter free. The patient is a 79 y/o 24 [facility] female with a [facility] Dx. Interstitial lung disease. The patient was received sitting on her bed in her bedroom. Patient was very polite but understands very little [place] language. Patient appeared pleasant and comfortable. Patient gave thanks for this visit. Teresa Sage was the POC for this visit. Teresa Sage reported that the patient wants to return home to [facility]. Teresa Erb stated that one of his sisters will be visiting in March and will stay with them for a week. At this time Teresa Sage, his sister, and the patient will have a conversation about returning home. Teresa Sage reported that the patient is using the O2 at night intermittently. Teresa Sage reported that the patient's appetite is good, patient can sleep through most of the night. Patient's mood is good and enjoys time with her Grandchildren. Teresa Sage stated that he and his family are coping appropriately. Teresa Sage did not have any immediate needs. LMSW requested the patient's bank statement and the pension statement on this visit. Teresa Sage stated that he is having difficulty obtaining the pension paperwork due to his ROBBIN having a CVA earlier this month. LMSW stated that today is the last day of medical coverage as explained on the last visit in March. LMSW spoke to Teresa Sage about the possibility of the patient graduating hospice services and the importance of having the FAP in place before the patient D/C from services. Teresa Sage was aware that the patient might not be eligible for hospice services as the patient is getting better. LMSW spoke with Teresa Sage on planning for the patient to return home if there is a window of opportunity, as this is the patient's wish.  Teresa Sage agreed but stated that he wants to wait until his sister arrives here from [facility] to assess the situation.

## 2023-03-01 ENCOUNTER — HOME CARE VISIT (OUTPATIENT)
Dept: SCHEDULING | Facility: HOME HEALTH | Age: 88
End: 2023-03-01

## 2023-03-01 ENCOUNTER — HOME CARE VISIT (OUTPATIENT)
Dept: HOSPICE | Facility: HOSPICE | Age: 88
End: 2023-03-01

## 2023-03-01 PROCEDURE — G0156 HHCP-SVS OF AIDE,EA 15 MIN: HCPCS

## 2023-03-02 ENCOUNTER — HOME CARE VISIT (OUTPATIENT)
Dept: SCHEDULING | Facility: HOME HEALTH | Age: 88
End: 2023-03-02

## 2023-03-02 VITALS
HEART RATE: 85 BPM | RESPIRATION RATE: 20 BRPM | OXYGEN SATURATION: 95 % | DIASTOLIC BLOOD PRESSURE: 84 MMHG | SYSTOLIC BLOOD PRESSURE: 148 MMHG

## 2023-03-02 PROCEDURE — G0156 HHCP-SVS OF AIDE,EA 15 MIN: HCPCS

## 2023-03-02 NOTE — HOSPICE
Joint visit with Jorje Esteban NP. Patient recieved in bedroom sitting on side of bed. She is alert and oriented x4. Patient states she is feeling much better. She uses oxygen occassionally at night. She has shortness of breath when ambulating 10-20 feet. Lungs are clear. SPO2 95% on room air. Pt continues to use albuterol nebulizers 2 times daily for cough. She is continent of bowel and bladder. VSS. Skin is intact. MAR reviewed and refills for albuterol sent to [facility]. Family states they prefer to get her other medications from [facility] as they are what she has been used to. No concerns at this time. Family encouraged to call with any questions or concerns.

## 2023-03-06 ENCOUNTER — HOME CARE VISIT (OUTPATIENT)
Dept: SCHEDULING | Facility: HOME HEALTH | Age: 88
End: 2023-03-06

## 2023-03-06 PROCEDURE — G0156 HHCP-SVS OF AIDE,EA 15 MIN: HCPCS

## 2023-03-08 ENCOUNTER — HOME CARE VISIT (OUTPATIENT)
Dept: SCHEDULING | Facility: HOME HEALTH | Age: 88
End: 2023-03-08

## 2023-03-08 PROCEDURE — G0156 HHCP-SVS OF AIDE,EA 15 MIN: HCPCS

## 2023-03-09 ENCOUNTER — HOME CARE VISIT (OUTPATIENT)
Dept: SCHEDULING | Facility: HOME HEALTH | Age: 88
End: 2023-03-09

## 2023-03-09 ENCOUNTER — HOME CARE VISIT (OUTPATIENT)
Dept: HOSPICE | Facility: HOSPICE | Age: 88
End: 2023-03-09

## 2023-03-09 PROCEDURE — G0156 HHCP-SVS OF AIDE,EA 15 MIN: HCPCS

## 2023-03-09 NOTE — HOSPICE
Call made to son, Lucious Schaumann, to set up visit time. He requested to skip this week and states his Mom is doing well. Patient visit scheduled for Tuesday. Lucious Schaumann notified that patient's hosp[facility] at the end of her benefit period. Lucious Schaumann would like to speak with MSW about [facility]. MSW notified.

## 2023-03-13 ENCOUNTER — HOME CARE VISIT (OUTPATIENT)
Dept: SCHEDULING | Facility: HOME HEALTH | Age: 88
End: 2023-03-13

## 2023-03-13 PROCEDURE — G0156 HHCP-SVS OF AIDE,EA 15 MIN: HCPCS

## 2023-03-14 ENCOUNTER — TELEPHONE (OUTPATIENT)
Dept: FAMILY MEDICINE CLINIC | Age: 88
End: 2023-03-14

## 2023-03-14 NOTE — TELEPHONE ENCOUNTER
[facility]   Phone:  [phone]      Fax:  [phone] [address], [address]    Name:  Cruz Garcia  YOB: 1928      Received email from Heath Pang RN, Clinical Manager with [address] [place] [address] inquiring if we can accept Monica Le to [facility] when she is discharged from hospice on 3/24/23. Patient was admitted to [address] [place] [address] on 9/27/22 during a hospitalization for respiratory failure after COVID. Per Ashley Hill NP's face to face note on 3/4/23, Ms. Frances Fernandez has continued to improve. She has been out to [employer] and to [employer] once. She is ambulatory in the home with her rollator. HB team discussed the referral at the IDT meeting today and the team decided that they are not able to accept Cruz Garcia, mainly due to her homebound status. [facility] sees patients that are bedbound or wheelchair bound and do not have a ramp so they are confined to their homes. The [facility] practice does consider other patients if they have very limited mobility and they only go out to see a specialist with a taxing effort. [facility]  providers suggest that patient make an appointment at [facility] or with a PCP office near her home. This nurse emailed Heath Pang RN and informed her of above decision.     JAGUAR Brown, RN-BC, [facility]  Referral Navigator, Home Based [facility]ive [facility]

## 2023-03-15 ENCOUNTER — HOME CARE VISIT (OUTPATIENT)
Dept: SCHEDULING | Facility: HOME HEALTH | Age: 88
End: 2023-03-15

## 2023-03-15 PROCEDURE — G0299 HHS/HOSPICE OF RN EA 15 MIN: HCPCS

## 2023-03-16 ENCOUNTER — HOME CARE VISIT (OUTPATIENT)
Dept: SCHEDULING | Facility: HOME HEALTH | Age: 88
End: 2023-03-16

## 2023-03-16 VITALS
SYSTOLIC BLOOD PRESSURE: 130 MMHG | RESPIRATION RATE: 18 BRPM | OXYGEN SATURATION: 97 % | HEART RATE: 87 BPM | DIASTOLIC BLOOD PRESSURE: 77 MMHG

## 2023-03-16 PROCEDURE — G0155 HHCP-SVS OF CSW,EA 15 MIN: HCPCS

## 2023-03-16 NOTE — HOSPICE
Frank Dad   79 yo female   Hospice Dx: Interstitial Lung Disease   PPS: 60   Problem: Ms. Jordi Kirkland is a 80 yr old female on service for interstitial lung disease. She has had no decline this benefit period and has improved since admission. She has not used oxygen in 1 week. She ambulates with a cane, is continent of bowel and bladder, and maintains a healthy appetite. Plan for next 2 weeks: Continue to monitor for signs and symptoms of decline.

## 2023-03-17 ENCOUNTER — HOME CARE VISIT (OUTPATIENT)
Dept: HOSPICE | Facility: HOSPICE | Age: 88
End: 2023-03-17

## 2023-03-17 NOTE — HOSPICE
LMSW arrived at the patient's home to complete a routine visit for [facility]. The LMSW was greeted at the front door by the patient's DIL Shama and the patient's son Amelia Brown was present. The home was clean and clutter free. The patient is a 81 y/o 24 [facility] female with a [facility] Dx. Interstitial lung disease. The patient was received sitting on her bed in her bedroom. Patient was very polite but understands very little [place] language. Patient appeared pleasant and comfortable. Patient gave thanks for this visit. Amelia Brown was the POC for this visit. Amelia Brown reported that the patient wants to return home to [facility]. Amelia Brown stated that one of his sisters will be visiting in March and will stay with them for a week. At this time Amelia Brown, his sister, and the patient will have a conversation about returning home. Amelia Brown reported that the patient is not using the O2, and it is a good time for the patient to return home. Amelia Brown reported that the patient's appetite is good, patient can sleep through most of the night. Patient's mood is good and enjoys time with her Grandchildren. Amelia Brown stated that he and his family are coping appropriately. The main purpose of this visit was having Amelia Brown sign a D/C form from hospice services as the patient no longer meets the criteria. Amelia Brown verbally understood but had some concerns about healthcare after D/C. LMSW provided multiple free clinics in the [facility] and suggested that the patient finds a PCP [facility] as the patient has jane health insurance until 3/24/23. Amelia Brown stated that he felt better prepared after this conversation. LMSW provided emotional support and supportive counseling.

## 2023-03-20 ENCOUNTER — HOME CARE VISIT (OUTPATIENT)
Dept: SCHEDULING | Facility: HOME HEALTH | Age: 88
End: 2023-03-20

## 2023-03-20 PROCEDURE — G0156 HHCP-SVS OF AIDE,EA 15 MIN: HCPCS

## 2023-03-20 PROCEDURE — G0299 HHS/HOSPICE OF RN EA 15 MIN: HCPCS

## 2023-03-21 VITALS
RESPIRATION RATE: 16 BRPM | TEMPERATURE: 98.6 F | DIASTOLIC BLOOD PRESSURE: 85 MMHG | OXYGEN SATURATION: 99 % | HEART RATE: 86 BPM | SYSTOLIC BLOOD PRESSURE: 145 MMHG

## 2023-03-21 NOTE — HOSPICE
Patient found A&Ox3 with daughter in law present. Patient denies pain, reports no difficulties with intake, output or sleep. No medication nor supply needs. Reminded patient and daughter in law to call with any changes or needs.

## 2023-03-22 ENCOUNTER — HOME CARE VISIT (OUTPATIENT)
Dept: HOSPICE | Facility: HOSPICE | Age: 88
End: 2023-03-22

## 2023-03-22 ENCOUNTER — HOME CARE VISIT (OUTPATIENT)
Dept: SCHEDULING | Facility: HOME HEALTH | Age: 88
End: 2023-03-22

## 2023-03-22 PROCEDURE — G0156 HHCP-SVS OF AIDE,EA 15 MIN: HCPCS

## 2023-03-22 PROCEDURE — G0299 HHS/HOSPICE OF RN EA 15 MIN: HCPCS

## 2023-03-22 NOTE — HOSPICE
Patient no longer meets hospice criteria. She has been discharged with a 30 day supply of medications and resources for PCP and care in the area. Patient is living with her son, Sivan Cazares, and daughter in law.

## 2023-03-24 ENCOUNTER — HOME CARE VISIT (OUTPATIENT)
Dept: HOSPICE | Facility: HOSPICE | Age: 88
End: 2023-03-24

## 2023-08-31 ENCOUNTER — HOSPICE ADMISSION (OUTPATIENT)
Age: 88
End: 2023-08-31
Payer: SELF-PAY

## 2023-09-01 ENCOUNTER — HOME CARE VISIT (OUTPATIENT)
Facility: HOME HEALTH | Age: 88
End: 2023-09-01
Payer: SELF-PAY

## 2023-09-01 ENCOUNTER — APPOINTMENT (OUTPATIENT)
Facility: HOSPITAL | Age: 88
End: 2023-09-01
Payer: SELF-PAY

## 2023-09-01 ENCOUNTER — HOSPITAL ENCOUNTER (EMERGENCY)
Facility: HOSPITAL | Age: 88
Discharge: HOME | End: 2023-09-01
Attending: EMERGENCY MEDICINE
Payer: SELF-PAY

## 2023-09-01 VITALS
WEIGHT: 156 LBS | RESPIRATION RATE: 22 BRPM | SYSTOLIC BLOOD PRESSURE: 130 MMHG | DIASTOLIC BLOOD PRESSURE: 80 MMHG | HEART RATE: 95 BPM

## 2023-09-01 VITALS
SYSTOLIC BLOOD PRESSURE: 176 MMHG | OXYGEN SATURATION: 94 % | TEMPERATURE: 98.1 F | WEIGHT: 156.53 LBS | RESPIRATION RATE: 20 BRPM | HEART RATE: 95 BPM | DIASTOLIC BLOOD PRESSURE: 86 MMHG

## 2023-09-01 DIAGNOSIS — J91.0 MALIGNANT PLEURAL EFFUSION: ICD-10-CM

## 2023-09-01 DIAGNOSIS — R09.02 HYPOXIA: Primary | ICD-10-CM

## 2023-09-01 LAB
ALBUMIN SERPL-MCNC: 3.4 G/DL (ref 3.5–5)
ALBUMIN/GLOB SERPL: 0.8 (ref 1.1–2.2)
ALP SERPL-CCNC: 82 U/L (ref 45–117)
ALT SERPL-CCNC: 16 U/L (ref 12–78)
ANION GAP SERPL CALC-SCNC: 7 MMOL/L (ref 5–15)
AST SERPL-CCNC: 21 U/L (ref 15–37)
BASOPHILS # BLD: 0.1 K/UL (ref 0–0.1)
BASOPHILS NFR BLD: 1 % (ref 0–1)
BILIRUB SERPL-MCNC: 0.4 MG/DL (ref 0.2–1)
BUN SERPL-MCNC: 14 MG/DL (ref 6–20)
BUN/CREAT SERPL: 13 (ref 12–20)
CALCIUM SERPL-MCNC: 9.1 MG/DL (ref 8.5–10.1)
CHLORIDE SERPL-SCNC: 98 MMOL/L (ref 97–108)
CO2 SERPL-SCNC: 33 MMOL/L (ref 21–32)
CREAT SERPL-MCNC: 1.12 MG/DL (ref 0.55–1.02)
D DIMER PPP FEU-MCNC: 1.9 MG/L FEU (ref 0–0.65)
DIFFERENTIAL METHOD BLD: ABNORMAL
EKG ATRIAL RATE: 92 BPM
EKG DIAGNOSIS: NORMAL
EKG P AXIS: 69 DEGREES
EKG P-R INTERVAL: 164 MS
EKG Q-T INTERVAL: 348 MS
EKG QRS DURATION: 70 MS
EKG QTC CALCULATION (BAZETT): 430 MS
EKG R AXIS: 6 DEGREES
EKG T AXIS: 43 DEGREES
EKG VENTRICULAR RATE: 92 BPM
EOSINOPHIL # BLD: 0.1 K/UL (ref 0–0.4)
EOSINOPHIL NFR BLD: 2 % (ref 0–7)
ERYTHROCYTE [DISTWIDTH] IN BLOOD BY AUTOMATED COUNT: 13.5 % (ref 11.5–14.5)
GLOBULIN SER CALC-MCNC: 4.2 G/DL (ref 2–4)
GLUCOSE SERPL-MCNC: 105 MG/DL (ref 65–100)
HCT VFR BLD AUTO: 44.1 % (ref 35–47)
HGB BLD-MCNC: 13.5 G/DL (ref 11.5–16)
IMM GRANULOCYTES # BLD AUTO: 0 K/UL (ref 0–0.04)
IMM GRANULOCYTES NFR BLD AUTO: 0 % (ref 0–0.5)
LACTATE BLD-SCNC: 1.65 MMOL/L (ref 0.4–2)
LYMPHOCYTES # BLD: 0.6 K/UL (ref 0.8–3.5)
LYMPHOCYTES NFR BLD: 11 % (ref 12–49)
MCH RBC QN AUTO: 27.3 PG (ref 26–34)
MCHC RBC AUTO-ENTMCNC: 30.6 G/DL (ref 30–36.5)
MCV RBC AUTO: 89.3 FL (ref 80–99)
MONOCYTES # BLD: 0.5 K/UL (ref 0–1)
MONOCYTES NFR BLD: 8 % (ref 5–13)
NEUTS SEG # BLD: 4.6 K/UL (ref 1.8–8)
NEUTS SEG NFR BLD: 78 % (ref 32–75)
NRBC # BLD: 0 K/UL (ref 0–0.01)
NRBC BLD-RTO: 0 PER 100 WBC
NT PRO BNP: 477 PG/ML (ref 0–450)
PLATELET # BLD AUTO: 172 K/UL (ref 150–400)
PMV BLD AUTO: 10.3 FL (ref 8.9–12.9)
POTASSIUM SERPL-SCNC: 4.4 MMOL/L (ref 3.5–5.1)
PROT SERPL-MCNC: 7.6 G/DL (ref 6.4–8.2)
RBC # BLD AUTO: 4.94 M/UL (ref 3.8–5.2)
RBC MORPH BLD: ABNORMAL
SODIUM SERPL-SCNC: 138 MMOL/L (ref 136–145)
TROPONIN I SERPL HS-MCNC: 19 NG/L (ref 0–51)
WBC # BLD AUTO: 5.9 K/UL (ref 3.6–11)

## 2023-09-01 PROCEDURE — 93005 ELECTROCARDIOGRAM TRACING: CPT | Performed by: EMERGENCY MEDICINE

## 2023-09-01 PROCEDURE — 6360000002 HC RX W HCPCS: Performed by: EMERGENCY MEDICINE

## 2023-09-01 PROCEDURE — 99285 EMERGENCY DEPT VISIT HI MDM: CPT

## 2023-09-01 PROCEDURE — G0299 HHS/HOSPICE OF RN EA 15 MIN: HCPCS

## 2023-09-01 PROCEDURE — 71275 CT ANGIOGRAPHY CHEST: CPT

## 2023-09-01 PROCEDURE — 80053 COMPREHEN METABOLIC PANEL: CPT

## 2023-09-01 PROCEDURE — 83880 ASSAY OF NATRIURETIC PEPTIDE: CPT

## 2023-09-01 PROCEDURE — 2500000001 HSPC NON INJECTABLE MED

## 2023-09-01 PROCEDURE — 6360000004 HC RX CONTRAST MEDICATION: Performed by: EMERGENCY MEDICINE

## 2023-09-01 PROCEDURE — 84484 ASSAY OF TROPONIN QUANT: CPT

## 2023-09-01 PROCEDURE — 83605 ASSAY OF LACTIC ACID: CPT

## 2023-09-01 PROCEDURE — 71045 X-RAY EXAM CHEST 1 VIEW: CPT

## 2023-09-01 PROCEDURE — 85025 COMPLETE CBC W/AUTO DIFF WBC: CPT

## 2023-09-01 PROCEDURE — 85379 FIBRIN DEGRADATION QUANT: CPT

## 2023-09-01 PROCEDURE — 6370000000 HC RX 637 (ALT 250 FOR IP): Performed by: EMERGENCY MEDICINE

## 2023-09-01 PROCEDURE — 36415 COLL VENOUS BLD VENIPUNCTURE: CPT

## 2023-09-01 PROCEDURE — 0651 HSPC ROUTINE HOME CARE

## 2023-09-01 RX ORDER — METHYLPREDNISOLONE 4 MG/1
TABLET ORAL
Qty: 21 TABLET | Refills: 0 | Status: SHIPPED | OUTPATIENT
Start: 2023-09-01 | End: 2023-09-07

## 2023-09-01 RX ORDER — PREDNISONE 20 MG/1
60 TABLET ORAL
Status: COMPLETED | OUTPATIENT
Start: 2023-09-01 | End: 2023-09-01

## 2023-09-01 RX ORDER — AZITHROMYCIN 250 MG/1
250 TABLET, FILM COATED ORAL SEE ADMIN INSTRUCTIONS
Qty: 6 TABLET | Refills: 0 | Status: SHIPPED | OUTPATIENT
Start: 2023-09-01 | End: 2023-09-06

## 2023-09-01 RX ADMIN — IOPAMIDOL 80 ML: 755 INJECTION, SOLUTION INTRAVENOUS at 11:17

## 2023-09-01 RX ADMIN — IPRATROPIUM BROMIDE AND ALBUTEROL SULFATE 1 DOSE: 2.5; .5 SOLUTION RESPIRATORY (INHALATION) at 10:23

## 2023-09-01 RX ADMIN — PREDNISONE 60 MG: 20 TABLET ORAL at 10:23

## 2023-09-01 ASSESSMENT — ENCOUNTER SYMPTOMS
ORTHOPNEA: 1
INDIGESTION: 1
PAIN LOCATION - PAIN QUALITY: DULL
DYSPNEA ACTIVITY LEVEL: AT REST
COUGH: 1
COUGH CHARACTERISTICS: PRODUCTIVE

## 2023-09-01 NOTE — ED PROVIDER NOTES
[facility]  EMERGENCY DEPARTMENT ENCOUNTER      Pt Name: Aimee Levy  MRN: [id number]  [address] 7/8/1928  Date of evaluation: 9/1/2023  Provider: Elijah Parkinson MD    [address]       Chief Complaint   Patient presents with    Cough    Shortness of Breath                HISTORY OF PRESENT ILLNESS   (Location/Symptom, Timing/Onset, Context/Setting, Quality, Duration, Modifying Factors, Severity)  Note limiting factors. 80year-old with a history of hypertension, seizures. She presents accompanied by her son and other family members who provide the history. They report that she has had increasing dyspnea over the past 2 or 3 days. Her symptoms began with a cough 2 days ago. They tried cough medicine and albuterol with limited relief. No fever. Good p.o. intake. Her increased work of breathing have increased over the past 5 hours. Her son and daughter-in-law all reports that they attempted to get in touch with hospice yesterday. They were told by hospice personnel that they needed a referral.  They reached out to her primary care doctor for the referral.  Per family, they were told that the primary care doctor \"did not know how to do that. \"  The patient's  referred her to the ED apparently. Review of External Medical Records:     Nursing Notes were reviewed. REVIEW OF SYSTEMS    (2-9 systems for level 4, 10 or more for level 5)     Review of Systems    Except as noted above the remainder of the review of systems was reviewed and negative. PAST MEDICAL HISTORY     Past Medical History:   Diagnosis Date    Hypertension     Seizure [facility])          SURGICAL HISTORY     History reviewed. No pertinent surgical history.       CURRENT MEDICATIONS       Previous Medications    ACETAMINOPHEN (TYLENOL) 325 MG TABLET    Take 2 tablets by mouth every 6 hours as needed    ACETAMINOPHEN (TYLENOL) 650 MG SUPPOSITORY    Place 1 suppository rectally every 6 hours as needed

## 2023-09-01 NOTE — ED NOTES
Spoke to Troy with [employer] [phone]. She reports that the medical director needs to review the case prior to any decisions regarding hospice are made. YENY MICHELLE notified.       Dearl GEREMIAS Roberson  09/01/23 1796

## 2023-09-01 NOTE — ED NOTES
Per Dr. Torrey Reis, ok for patient to eat. Patient's family state they will bring her food.       Maximus Collins RN  09/01/23 1783

## 2023-09-01 NOTE — ED TRIAGE NOTES
Patient arrives to the ED with complaints of cough and shortness of breath x 2 days. Upon arrival O2 sats 87%, placed on 2L with improvement in O2 sats to 95%. Per patient's son she was on home hospice until March and they are interested in placing her back in home hospice. Patient's son called her care manager, who recommended he bring her to the ED for a referral for hospice. She was previously with [facility]. This RN spoke with Calderon Ta, the patient's , who confirmed that they need a new referral for hospice.

## 2023-09-01 NOTE — ED NOTES
Dr. Irene Puri at bedside to update family about lab and CT results. Family confirmed that they want to continue with home hospice and want to get her home today. Buna with [employer] notified. Buna reports that they can get a nurse to patient's home this afternoon. [facility] with [facility]. ETA: 1400.        Sekou Villanueva RN  09/01/23 6562

## 2023-09-01 NOTE — ED NOTES
[facility] [phone] to try to set up home hospice for the patient. Spoke to Davidson Spence, who reports that they need an order. [place] now speaking to Dr. Abraham Mayorga, who is placing an order.       Kindra Caballero RN  09/01/23 1590

## 2023-09-01 NOTE — HOSPICE
[address]   28  95                                                       Principle Hospice Diagnosis: metastatic cancer of unknown primary   Diagnoses RELATED to the terminal prognosis: mets. to lung, pleural effusion, breast masses noted on imaging   Other Diagnoses: HTN, reflux, seizure disorder    Date of Hospice Admission: 23  Hospice Attending Elected by Patient: Dr. Kaity Wilson   Primary Care Physician: Dr. Sophy Baker    Admitting RN: AILEEN  Nurse CM: Lorenza Cruz Worker: Mitchell Andrews:    declined    Durable DNR:  Yes   signed on admission, take copy to the home     Home:  did not discuss     Direct Observation: Patient is known to this team having been on hospice care last year, d/c for no longer meeting criteria. Son Edward Alvarado and his wife who are primary caregivers, report pt has increasing shortness of breath the last week or so, changes in appetite and cough. They wanted a new referral to hospice and somehow this was not facilitated by PCP, so they sought care from the [place] ED today. Work up revealed lung masses, pleural effusion and possible breast masses. With goals of care being on comfort only, patient was transported home for hospice admission. Sesar Baugh speaks only a little English so history and consents obtained with son Edward Alvarado. Pt in bed, no acute distress. PPS 40. Mildly SOB at rest, oxygen to be delivered asap today. Lungs coarse, family has been using albuterol nebs and she has robitussin on hand if needed. Vitals WNL. Bladder incontinence, continent of bowels, no skin concerns. Family reports her appetite and intake are decreasing, no nausea or vomiting. DDNR signed on admission. Other- reviewed plan of care with family. They will call with any needs. HHA to begin next week. Medications reviewed with Dr. Kaity Wilson.      ER Visits/ Hospitalizations in past year: 1  Onset Date of Hospice Diagnosis: 23  Summary of Disease Progression Leading to Hospice Diagnosis:  from ED

## 2023-09-01 NOTE — ED NOTES
Pt discharged in stable condition at this time. MD and this RN reviewed discharge instructions, prescriptions, and follow up with patient and her son  at bedside. Patient's son verbalized understanding and denies any needs or questions at this time. Understands that hospice nurse plans to meet them at home around 1400 today.         Federico Lu RN  09/01/23 5499

## 2023-09-02 PROCEDURE — 0651 HSPC ROUTINE HOME CARE

## 2023-09-03 ENCOUNTER — HOME CARE VISIT (OUTPATIENT)
Facility: HOME HEALTH | Age: 88
End: 2023-09-03
Payer: SELF-PAY

## 2023-09-03 VITALS
RESPIRATION RATE: 18 BRPM | SYSTOLIC BLOOD PRESSURE: 110 MMHG | DIASTOLIC BLOOD PRESSURE: 70 MMHG | HEART RATE: 84 BPM | OXYGEN SATURATION: 98 %

## 2023-09-03 PROCEDURE — G0299 HHS/HOSPICE OF RN EA 15 MIN: HCPCS

## 2023-09-03 PROCEDURE — 0651 HSPC ROUTINE HOME CARE

## 2023-09-04 ENCOUNTER — HOME CARE VISIT (OUTPATIENT)
Age: 88
End: 2023-09-04
Payer: SELF-PAY

## 2023-09-04 ENCOUNTER — HOME CARE VISIT (OUTPATIENT)
Facility: HOME HEALTH | Age: 88
End: 2023-09-04
Payer: SELF-PAY

## 2023-09-04 PROCEDURE — 0651 HSPC ROUTINE HOME CARE

## 2023-09-04 PROCEDURE — G0156 HHCP-SVS OF AIDE,EA 15 MIN: HCPCS

## 2023-09-05 ENCOUNTER — HOME CARE VISIT (OUTPATIENT)
Facility: HOME HEALTH | Age: 88
End: 2023-09-05
Payer: SELF-PAY

## 2023-09-05 PROCEDURE — 0651 HSPC ROUTINE HOME CARE

## 2023-09-05 PROCEDURE — G0155 HHCP-SVS OF CSW,EA 15 MIN: HCPCS

## 2023-09-06 PROCEDURE — 0651 HSPC ROUTINE HOME CARE

## 2023-09-06 NOTE — HOSPICE
LMSW arrived at the patient's home to complete an Initial Psychosocial Assessment visit for [facility]. The LMSW was greeted at the front door by the patient's son Guerline Silva and his wife Radha was present. The home was clean and clutter free. The patient is a 81 y/o [facility] female with a [facility] Dx. Metastatic malignant neoplasm of unknown primary site. The patient was received lying on her bed in her bedroom. Patient understands very little [place] language. Patient appeared pleasant and comfortable. Patient gave thanks for this visit. Guerline Silva was the POC for this visit. Guerline Silva reported that the patient came over for a vacation and had a fall which resulted in a laceration on the back of the patient's head. Patient then took ill and was in and out of the hospital a few times and then got COVID. Guerline Silva stated the patient would really like to return home to [facility] and was waiting for his sister to visit and determine if she would take the patient home with her. Guerline Silva stated that his sister said the patient would need more care than she could provide. Patient then took ill shortly after her DTR left and was found to have possible lung cancer. The patient needs assistance with ambulation to the bathroom and was observed using O2 N C while in bed. LMSW revisited the FAP and supporting documentation. LMSW provided other services through hospice that are available and assisted with final arrangements. Guerline Silva reported that he and his wife Radha are coping appropriately but would possibly like to use the [facility] for EOL. The patient was accepting of her prognosis and hospice support. 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 Patient was  to Sunny Gregory for 16 years who had passed in 1984. The patient had four children Chacha Mejia and [place] who lives in [place] and Guerline Ricardo who the patient resides with, and Rosales Garay who had passed in 2020. Patient has an older sister Yadira who lives in [facility].  Patient

## 2023-09-07 ENCOUNTER — HOME CARE VISIT (OUTPATIENT)
Facility: HOME HEALTH | Age: 88
End: 2023-09-07
Payer: SELF-PAY

## 2023-09-07 PROCEDURE — 0651 HSPC ROUTINE HOME CARE

## 2023-09-07 PROCEDURE — G0299 HHS/HOSPICE OF RN EA 15 MIN: HCPCS

## 2023-09-08 VITALS
DIASTOLIC BLOOD PRESSURE: 70 MMHG | HEART RATE: 74 BPM | OXYGEN SATURATION: 96 % | SYSTOLIC BLOOD PRESSURE: 126 MMHG | RESPIRATION RATE: 16 BRPM | TEMPERATURE: 97.5 F

## 2023-09-08 PROCEDURE — 0651 HSPC ROUTINE HOME CARE

## 2023-09-08 NOTE — HOSPICE
Patient A&Ox2, vital signs WNL, denies pain. Her son Sujey Saxena and daughter in law Fidelina Guajardo are present for visit. Patient has some congestion with clear phlegm managed with cough medicine. She was laying flat on her back for entire visit, with author recommending she use wedge provided by hospice for comfort and increased ease in labor of breathing. She ambulates safely with standby assitance to dining area for meals, getting winded easily and responding well to rest.  Patient rests much of the time. Hospice orientation provided, and twice weekly visits agreed upon. Sujey Saxena was encouraged to call with any needs or changes.

## 2023-09-09 PROCEDURE — 0651 HSPC ROUTINE HOME CARE

## 2023-09-10 PROCEDURE — 0651 HSPC ROUTINE HOME CARE

## 2023-09-11 ENCOUNTER — HOME CARE VISIT (OUTPATIENT)
Facility: HOME HEALTH | Age: 88
End: 2023-09-11
Payer: SELF-PAY

## 2023-09-11 PROCEDURE — 0651 HSPC ROUTINE HOME CARE

## 2023-09-11 PROCEDURE — G0156 HHCP-SVS OF AIDE,EA 15 MIN: HCPCS

## 2023-09-11 PROCEDURE — G0299 HHS/HOSPICE OF RN EA 15 MIN: HCPCS

## 2023-09-12 PROCEDURE — 0651 HSPC ROUTINE HOME CARE

## 2023-09-13 ENCOUNTER — HOME CARE VISIT (OUTPATIENT)
Facility: HOME HEALTH | Age: 88
End: 2023-09-13
Payer: SELF-PAY

## 2023-09-13 VITALS
DIASTOLIC BLOOD PRESSURE: 75 MMHG | HEART RATE: 76 BPM | SYSTOLIC BLOOD PRESSURE: 130 MMHG | TEMPERATURE: 97.8 F | RESPIRATION RATE: 20 BRPM | OXYGEN SATURATION: 98 %

## 2023-09-13 PROCEDURE — 2500000001 HSPC NON INJECTABLE MED

## 2023-09-13 PROCEDURE — G0156 HHCP-SVS OF AIDE,EA 15 MIN: HCPCS

## 2023-09-13 PROCEDURE — 0651 HSPC ROUTINE HOME CARE

## 2023-09-13 ASSESSMENT — ENCOUNTER SYMPTOMS
COUGH: 1
DYSPNEA ACTIVITY LEVEL: AFTER AMBULATING LESS THAN 10 FT
INDIGESTION: 1
CHEST TIGHTNESS: 1
COUGH CHARACTERISTICS: MOIST

## 2023-09-13 NOTE — HOSPICE
Patient A&Ox2, reporting breathing is managed, though productive cough of consistent amounts of clear to white phlegm continues with a feeling of fullness in the lungs. Breathing is supported with supplemental oxygen, regular nebulizer trreatments and Robitussin syrup. Patient and family were encouraged to continue these treatments and water intake and positioning were reinforced, with patient and caregiver verbalizing understanding. Patient ambulates into dining area for all meals, appetite has diminished. Shortness of breath develops quickly with ambulation and resolves with rest and breathing support. Encouraged patient and son Nitesh Lake to call with any changes or needs.     NEW MEDICATION INITIATION DOCUMENTATION:  Consulted AT MD to report change in patient status: yes,   Obtained Order from Provider for initiation of Symptom relief medication / other medication needed:yes,   Documentation completed in Telephone/Visit Note in [address]  Reason medication is being initiated:symptom  MD / Provider name consulted re: change in status / initiation of new medication:Nina Ferris NP  New Symptom(s): shortness of breath and sputum  New Order(s): Decadron 2mg daily with breakfast  Name of person being taught: Claudia Castillo  Instructions given: yes,  Side Effects taught:yes,  Response to teaching: verbalized understanding

## 2023-09-14 ENCOUNTER — HOME CARE VISIT (OUTPATIENT)
Facility: HOME HEALTH | Age: 88
End: 2023-09-14
Payer: SELF-PAY

## 2023-09-14 VITALS
HEART RATE: 86 BPM | SYSTOLIC BLOOD PRESSURE: 134 MMHG | TEMPERATURE: 97.9 F | DIASTOLIC BLOOD PRESSURE: 82 MMHG | RESPIRATION RATE: 20 BRPM

## 2023-09-14 PROCEDURE — 0651 HSPC ROUTINE HOME CARE

## 2023-09-14 PROCEDURE — G0299 HHS/HOSPICE OF RN EA 15 MIN: HCPCS

## 2023-09-14 ASSESSMENT — ENCOUNTER SYMPTOMS
DYSPNEA ACTIVITY LEVEL: WHILE SPEAKING
COUGH CHARACTERISTICS: CONGESTED
CHEST TIGHTNESS: 1
COUGH: 1

## 2023-09-14 NOTE — HOSPICE
Patient sitting up in bed upon arrival for visit; patient's son present. Patient is alert; denies pain currently. Patient reports dyspnea with minimal exertion on 2.5L O2 via NC. Patient was prescribed Decadron 2 mg daily on last nursing visit, but patient has not yet started. Instructed patient's son to start this morning. Patient utilizes PRN Albuterol nebulizers twice daily; encouraged to use three times daily for wheezing. Appetite stable. Patient reports regular bowel movements every other day. Skin intact. Son requesting refills for Albuterol and Guaifenesin; already ordered per [employer] denise. Supplies ordered for delivery via [employer]: Incontinence underwear, wipes, soap. [facility] contacted and notified to deliver O2 tubing kits and 3 additional oxygen tanks. Reinforced hospice 24/7 for any concerns or change in patient's condition.

## 2023-09-15 PROCEDURE — 0651 HSPC ROUTINE HOME CARE

## 2023-09-16 PROCEDURE — 0651 HSPC ROUTINE HOME CARE

## 2023-09-17 PROCEDURE — 0651 HSPC ROUTINE HOME CARE

## 2023-09-18 ENCOUNTER — HOME CARE VISIT (OUTPATIENT)
Facility: HOME HEALTH | Age: 88
End: 2023-09-18
Payer: SELF-PAY

## 2023-09-18 ENCOUNTER — HOME HEALTH/ HOSPICE FACE TO FACE (OUTPATIENT)
Facility: HOSPITAL | Age: 88
End: 2023-09-18

## 2023-09-18 VITALS
RESPIRATION RATE: 22 BRPM | DIASTOLIC BLOOD PRESSURE: 82 MMHG | TEMPERATURE: 97.8 F | HEART RATE: 85 BPM | OXYGEN SATURATION: 98 % | SYSTOLIC BLOOD PRESSURE: 142 MMHG

## 2023-09-18 PROCEDURE — G0299 HHS/HOSPICE OF RN EA 15 MIN: HCPCS

## 2023-09-18 PROCEDURE — G0156 HHCP-SVS OF AIDE,EA 15 MIN: HCPCS

## 2023-09-18 PROCEDURE — 0651 HSPC ROUTINE HOME CARE

## 2023-09-18 ASSESSMENT — ENCOUNTER SYMPTOMS
HEMOPTYSIS: 0
DYSPNEA ACTIVITY LEVEL: AFTER AMBULATING LESS THAN 10 FT
COUGH CHARACTERISTICS: MOIST
COUGH: 1

## 2023-09-18 NOTE — HOSPICE
Vini Or to meet patient as a readmission to hospice. She had COVID 19 in 9/2022 and afterwards was diagnosed with ILD. She went onto hospice services but she improved and eventually came off service. She went to the ER on 9/1/2023. The patient had been having increased sob at home. They had been trying to get a referral from their PCP back to hospice without success. Imaging in the ER showed a large left pleural effusion and left pleural nodular lesions measuring up to 1.7 X 0.8 cm. Also the left breast showed lesions with largest at 3.4 X 2.5 suspicious for malignancy. Also she had enlarged left axillary lymph nodes measuring up to 2.9 X 2.2 cm. Patient has recently been readmitted to hospice services. When I arrived at the home I spoke with her DIL. She and her  did not understand from the ER visit that the patient has metastatic cancer. They have NOT told the patient and probably will not. I reviewed the scans and what they meant. Patient has significant BOYCE. She is eating well. She does have 6/10 back pain at times and does not want to take meds but is willing to try a lidocaine patch. She has BM's qod which are usually soft. She is sleeping well at night. DIL reports she is having memory problems. PMH  HTN  Seizure disorder   ILD    ROS  Gen:  no fever, chills  HENT:  no rhinorrhea or pharyngitis  Heart:  no chest pain or edema  Lungs:  ++ BOYCE, on chronic oxygen, cough with thick white phlegm production  Abd:  no N/V/C/D, abd pain  MS:  back pain, particularly right lower  Neuro:  no headaches  :  no dysuria    O.  Gen:  frial elderly female in NAD sitting on edge of bed  Eyes: anicteric, conjunctiva pale  HENT: oral mucosa moist without rashes or lesions  Neck: no JVD.   1 cm submandubular node on right NTTP  Heart: RRR S1S2 no GRM's, no edema  Lungs: no increased wob, decreased in left lung, scattered wheezes throughout  Abd: no distention, bowel sounds normoactive, no

## 2023-09-18 NOTE — HOSPICE
Genoveva Price RNCM and Terry Retana NP preformed a joint visit with jonathantent and daughter-in-law (caregiver) today. Pateint alert and oriented and greeted RNCM and NP. Reviewed new dx of cancer and results of imaging with daughter-in-law who reports that the patient is not aware of the dx. Pts scan shows multiple tumors in breast, left lung, and lymph nodes as well as left plural effusion. Pts cargiver recieved news well but needs emotional and spiritual support. Patient initially reported no pain but later in the visit complained of back pain 6/10. Pt is averse to taking pain medications and prefers to use heating pads, topical ointments, and position changes for comfort. Lidocaine patch discussed with patient and caregiver and ordered by Maryann Wallace #: [id number]. Dicussed remaining on dexamethasone as it seems to be helping with patient's respiratory status. NP recommended increasing nebulizer treamtents to 4x/day for wheezing. Pt expressed interest in being seen by a  and receiving communion in her home. She used to enjoy going to [employer] and patient and family encouraged to take her to [employer] if pts energy levels and respriatory status can tolerate it. Caregiver agrees this would be good for her. Order sets for clergy documented and clergy communicated with. Caregiver inquired about planned trip to [facility] in December and whether it would be recommended for pt to make the trip. NP discussed that pts condition will likely not tolerate long distance travel. Next visit scheduled for Thursday 9/21.

## 2023-09-19 PROCEDURE — 0651 HSPC ROUTINE HOME CARE

## 2023-09-20 ENCOUNTER — HOME CARE VISIT (OUTPATIENT)
Facility: HOME HEALTH | Age: 88
End: 2023-09-20
Payer: SELF-PAY

## 2023-09-20 PROCEDURE — 0651 HSPC ROUTINE HOME CARE

## 2023-09-20 PROCEDURE — G0156 HHCP-SVS OF AIDE,EA 15 MIN: HCPCS

## 2023-09-21 PROCEDURE — 0651 HSPC ROUTINE HOME CARE

## 2023-09-22 ENCOUNTER — HOME CARE VISIT (OUTPATIENT)
Facility: HOME HEALTH | Age: 88
End: 2023-09-22
Payer: SELF-PAY

## 2023-09-22 PROCEDURE — G0299 HHS/HOSPICE OF RN EA 15 MIN: HCPCS

## 2023-09-22 PROCEDURE — 0651 HSPC ROUTINE HOME CARE

## 2023-09-23 ENCOUNTER — HOME CARE VISIT (OUTPATIENT)
Age: 88
End: 2023-09-23
Payer: SELF-PAY

## 2023-09-23 VITALS
DIASTOLIC BLOOD PRESSURE: 70 MMHG | SYSTOLIC BLOOD PRESSURE: 135 MMHG | OXYGEN SATURATION: 97 % | HEART RATE: 68 BPM | TEMPERATURE: 96.7 F | RESPIRATION RATE: 16 BRPM

## 2023-09-23 PROCEDURE — 0651 HSPC ROUTINE HOME CARE

## 2023-09-23 ASSESSMENT — ENCOUNTER SYMPTOMS
DYSPNEA ACTIVITY LEVEL: AFTER AMBULATING 10 - 20 FT
COUGH CHARACTERISTICS: PRODUCTIVE
COUGH: 1

## 2023-09-23 NOTE — HOSPICE
Patient A&Ox3 resting comfortably in bed. She denies pain, vital signs WNL. Breathing has improved with steroid use and breathing treatments. Cough and sputum production have lessened. Shortness of breath with exertion persists, with patient ambulating as she can, resting most of the time. Skin is intact. No supply nor medication needs. Encouraged son Dejah Hernandez to call as needed.

## 2023-09-24 PROCEDURE — 0651 HSPC ROUTINE HOME CARE

## 2023-09-25 ENCOUNTER — HOME CARE VISIT (OUTPATIENT)
Facility: HOME HEALTH | Age: 88
End: 2023-09-25
Payer: SELF-PAY

## 2023-09-25 VITALS
DIASTOLIC BLOOD PRESSURE: 60 MMHG | TEMPERATURE: 98.2 F | RESPIRATION RATE: 20 BRPM | HEART RATE: 75 BPM | OXYGEN SATURATION: 95 % | SYSTOLIC BLOOD PRESSURE: 108 MMHG

## 2023-09-25 PROCEDURE — G0156 HHCP-SVS OF AIDE,EA 15 MIN: HCPCS

## 2023-09-25 PROCEDURE — 2500000001 HSPC NON INJECTABLE MED

## 2023-09-25 PROCEDURE — G0299 HHS/HOSPICE OF RN EA 15 MIN: HCPCS

## 2023-09-25 PROCEDURE — 0651 HSPC ROUTINE HOME CARE

## 2023-09-25 ASSESSMENT — ENCOUNTER SYMPTOMS
SPUTUM PRODUCTION: 1
STOOL DESCRIPTION: FORMED
COUGH: 1
DYSPNEA ACTIVITY LEVEL: AFTER AMBULATING LESS THAN 10 FT
COUGH CHARACTERISTICS: PRODUCTIVE
HEMOPTYSIS: 0
SPUTUM AMOUNT: MODERATE
PAIN LOCATION - PAIN QUALITY: ACHING

## 2023-09-25 NOTE — HOSPICE
RNCM was greeted by caregivers 2 grandsons at the door (1 in highschool and 1 in ). Patient lives with her youngest son, his wife and their 2 sons. Patient's physical condition remains stable and vital signs WNL. Patient reports increased fatigue and more sleep the past 3 days. Patient's intake and output are unchanged. Patient complains of feelings of \"loose teeth\" on her back molars when she chews her food. RN confirms that patient eats a mechanical soft diet. No s/s of infection or infamation in the mouth noted. Conversation about patient's awareness of her cancer diagnoses was had with pts son and daughter in law. Her son states that she knows her body and that as her pain increases and condition worsens they will talk with her about her dx. They do not want to burden her prematurely with her dx for fear that she may get depressed. She is well supported by family, Islam, and community.      More albuterol and guafenesin was ordered - Confirmation #: [id number]

## 2023-09-26 ENCOUNTER — HOME CARE VISIT (OUTPATIENT)
Age: 88
End: 2023-09-26
Payer: SELF-PAY

## 2023-09-26 PROCEDURE — 0651 HSPC ROUTINE HOME CARE

## 2023-09-26 NOTE — HOSPICE
Patient is sitting on the bed breathing through oxygen. Patient is from [facility] and she has one son, and two daughters. 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 Patient does complain of right knee hurting, and hands become achy when she does too much with them. Patient  has passed over 35 years ago. Patient would like a visit once every two months.  provided empathic hearing, presence, prayer, Holy Communion. Goal is to provide spiritual support for patient over the next two weeks.

## 2023-09-26 NOTE — HOSPICE
No spiritual care issues noted on admission, nor has the family or patient reached out for spiritual support. Will contact family the week of 09/25/23 to offer support and assess spiritual care needs.

## 2023-09-27 ENCOUNTER — HOME CARE VISIT (OUTPATIENT)
Facility: HOME HEALTH | Age: 88
End: 2023-09-27
Payer: SELF-PAY

## 2023-09-27 VITALS
HEART RATE: 72 BPM | TEMPERATURE: 98 F | RESPIRATION RATE: 20 BRPM | OXYGEN SATURATION: 96 % | DIASTOLIC BLOOD PRESSURE: 80 MMHG | SYSTOLIC BLOOD PRESSURE: 137 MMHG

## 2023-09-27 PROCEDURE — 0651 HSPC ROUTINE HOME CARE

## 2023-09-27 PROCEDURE — G0299 HHS/HOSPICE OF RN EA 15 MIN: HCPCS

## 2023-09-27 PROCEDURE — G0156 HHCP-SVS OF AIDE,EA 15 MIN: HCPCS

## 2023-09-27 ASSESSMENT — ENCOUNTER SYMPTOMS: HEMOPTYSIS: 0

## 2023-09-27 NOTE — HOSPICE
RNCM was greeted by patient sitting her chair after receiving her bath in her room. Patient has no complaints of pain. Redness noted on the cornea of the right eye and says that it's been \"watering\" but has not pain. RNCM exchanged email with Juan Luis Kim NP and will monitor for changes. All vital signs WNL. Followed up with family about aide schedule and said I would contact them when I had more information for them.  No medication refills needed and will order some supplies to be delivered to their home  order #[id number]

## 2023-09-28 PROCEDURE — 0651 HSPC ROUTINE HOME CARE

## 2023-09-29 PROCEDURE — 0651 HSPC ROUTINE HOME CARE

## 2023-09-30 PROCEDURE — 0651 HSPC ROUTINE HOME CARE

## 2023-10-01 PROCEDURE — 0651 HSPC ROUTINE HOME CARE

## 2023-10-02 ENCOUNTER — HOME CARE VISIT (OUTPATIENT)
Facility: HOME HEALTH | Age: 88
End: 2023-10-02
Payer: SELF-PAY

## 2023-10-02 VITALS
DIASTOLIC BLOOD PRESSURE: 81 MMHG | OXYGEN SATURATION: 99 % | TEMPERATURE: 98 F | SYSTOLIC BLOOD PRESSURE: 140 MMHG | HEART RATE: 69 BPM | RESPIRATION RATE: 20 BRPM

## 2023-10-02 PROCEDURE — G0299 HHS/HOSPICE OF RN EA 15 MIN: HCPCS

## 2023-10-02 PROCEDURE — G0156 HHCP-SVS OF AIDE,EA 15 MIN: HCPCS

## 2023-10-02 ASSESSMENT — ENCOUNTER SYMPTOMS
HEMOPTYSIS: 0
SPUTUM AMOUNT: SCANT
COUGH CHARACTERISTICS: PRODUCTIVE
STOOL DESCRIPTION: FORMED
COUGH: 1
SPUTUM PRODUCTION: 1

## 2023-10-02 NOTE — HOSPICE
Patient greeted RNCM sitting on the side of her bed. She is alert and reports less fatique since last encounter. She reports no pain and reduced sputum production. Appetite and bowel/bladder habits regular and unchanged. No concerns at this time. Patient's vital signs were WNL and right eye is no longer red. More dexamethasone ordered.  Confirmation #: [id number]

## 2023-10-04 ENCOUNTER — HOME CARE VISIT (OUTPATIENT)
Facility: HOME HEALTH | Age: 88
End: 2023-10-04
Payer: SELF-PAY

## 2023-10-04 PROCEDURE — G0156 HHCP-SVS OF AIDE,EA 15 MIN: HCPCS

## 2023-10-05 ENCOUNTER — HOME CARE VISIT (OUTPATIENT)
Facility: HOME HEALTH | Age: 88
End: 2023-10-05
Payer: SELF-PAY

## 2023-10-05 VITALS
RESPIRATION RATE: 18 BRPM | OXYGEN SATURATION: 98 % | TEMPERATURE: 98.2 F | DIASTOLIC BLOOD PRESSURE: 61 MMHG | HEART RATE: 67 BPM | SYSTOLIC BLOOD PRESSURE: 118 MMHG

## 2023-10-05 PROCEDURE — G0299 HHS/HOSPICE OF RN EA 15 MIN: HCPCS

## 2023-10-05 PROCEDURE — G0155 HHCP-SVS OF CSW,EA 15 MIN: HCPCS

## 2023-10-05 ASSESSMENT — ENCOUNTER SYMPTOMS: HEMOPTYSIS: 0

## 2023-10-06 NOTE — HOSPICE
LMSW arrived at the patient's home to complete an Initial Psychosocial Assessment visit for [facility]. The LMSW was greeted at the front door by the patient's son Dora Ann and his wife Radha was present. The home was clean and clutter free. The patient is a 81 y/o [address] [place] [address] female with a [facility] Dx. Metastatic malignant neoplasm of unknown primary site. The patient was received lying on her bed in her bedroom. The patient needs assistance with ambulation to the bathroom and was observed using O2 N C while in bed. Patient's appetite has decreased from last month. Patient can sleep through the night and takes naps during the day. Patient appeared pleasant and comfortable. Patient gave thanks for this visit. Dora Ann was the POC for this visit. Dora Ann reported that he and his family are waiting for the Medicaid approval so they can hire an agency to relieve the family with caregiving 24/7. Dora Ann stated that he is working on the documentation needed for the financial application and was informed that the current plan expires 10/31/23. Dora Ann reported that he and his wife Radha are coping appropriately but would possibly like to use the [facility] for EOL. The patient was accepting of her prognosis and hospice support. LMSW revisited the community resources, provided emotional support, and supportive counseling related to EOL care.

## 2023-10-09 ENCOUNTER — HOME CARE VISIT (OUTPATIENT)
Facility: HOME HEALTH | Age: 88
End: 2023-10-09
Payer: SELF-PAY

## 2023-10-09 PROCEDURE — G0156 HHCP-SVS OF AIDE,EA 15 MIN: HCPCS

## 2023-10-09 PROCEDURE — G0299 HHS/HOSPICE OF RN EA 15 MIN: HCPCS

## 2023-10-10 VITALS
SYSTOLIC BLOOD PRESSURE: 120 MMHG | OXYGEN SATURATION: 98 % | HEART RATE: 70 BPM | RESPIRATION RATE: 18 BRPM | DIASTOLIC BLOOD PRESSURE: 66 MMHG | TEMPERATURE: 97.8 F

## 2023-10-11 ENCOUNTER — HOME CARE VISIT (OUTPATIENT)
Facility: HOME HEALTH | Age: 88
End: 2023-10-11
Payer: SELF-PAY

## 2023-10-11 PROCEDURE — G0156 HHCP-SVS OF AIDE,EA 15 MIN: HCPCS

## 2023-10-11 NOTE — HOSPICE
Patient A&Ox4, denies pain, vital signs WNL. She denies any recent episodes of shortness of breath. She ambulates out of the room seeral times daily. She rest most of the time. No change nor needs. Patient and son encouraged to call as needed.

## 2023-10-12 ENCOUNTER — HOME CARE VISIT (OUTPATIENT)
Facility: HOME HEALTH | Age: 88
End: 2023-10-12
Payer: SELF-PAY

## 2023-10-12 VITALS
TEMPERATURE: 98.1 F | HEART RATE: 73 BPM | OXYGEN SATURATION: 97 % | RESPIRATION RATE: 16 BRPM | SYSTOLIC BLOOD PRESSURE: 116 MMHG | DIASTOLIC BLOOD PRESSURE: 65 MMHG

## 2023-10-12 PROCEDURE — G0299 HHS/HOSPICE OF RN EA 15 MIN: HCPCS

## 2023-10-12 ASSESSMENT — ENCOUNTER SYMPTOMS: HEMOPTYSIS: 0

## 2023-10-12 NOTE — HOSPICE
Patient lying in bed and greeted RNCM when arrived. Patient's condition unchanged since last encounter with the exception of patient sleeping more during the day. Vital signs WNL and patient does not complain of pain. Family connected with [facility] to recieve flu shot and will schedule soon. Patient's family satisfied with HHA visits. Ordered more dexamethasone and guafensein. Confirmation #: [id number]

## 2023-10-16 ENCOUNTER — HOME CARE VISIT (OUTPATIENT)
Facility: HOME HEALTH | Age: 88
End: 2023-10-16
Payer: SELF-PAY

## 2023-10-16 PROCEDURE — G0156 HHCP-SVS OF AIDE,EA 15 MIN: HCPCS

## 2023-10-16 PROCEDURE — G0299 HHS/HOSPICE OF RN EA 15 MIN: HCPCS

## 2023-10-17 VITALS
DIASTOLIC BLOOD PRESSURE: 69 MMHG | OXYGEN SATURATION: 97 % | RESPIRATION RATE: 16 BRPM | TEMPERATURE: 98 F | SYSTOLIC BLOOD PRESSURE: 114 MMHG | HEART RATE: 67 BPM

## 2023-10-17 ASSESSMENT — ENCOUNTER SYMPTOMS
COUGH: 1
COUGH CHARACTERISTICS: PRODUCTIVE
SPUTUM AMOUNT: SCANT
HEMOPTYSIS: 0
SPUTUM PRODUCTION: 1

## 2023-10-17 NOTE — HOSPICE
Patient received RNCM while lying in bed. Vital signs WNL. Patient reports more fatigue that usual the past few days. Discussed with caregiver the possibilty of using the beside commode for days when the patient feels too fatigued to walk to the bathroom. Patient refuses to use a bedside commode and caregiver said they will revisit the idea when she needs it more consistently. Patient currently is a 2 assist when getting to the bathroom. Supplies Order Number : [id number].  CORNELIUS nurse scheduled to come next week to administer flu shot per family's request.

## 2023-10-18 ENCOUNTER — HOME CARE VISIT (OUTPATIENT)
Facility: HOME HEALTH | Age: 88
End: 2023-10-18
Payer: SELF-PAY

## 2023-10-18 PROCEDURE — G0156 HHCP-SVS OF AIDE,EA 15 MIN: HCPCS

## 2023-10-19 ENCOUNTER — HOME CARE VISIT (OUTPATIENT)
Age: 88
End: 2023-10-19
Payer: SELF-PAY

## 2023-10-19 ENCOUNTER — HOME CARE VISIT (OUTPATIENT)
Facility: HOME HEALTH | Age: 88
End: 2023-10-19
Payer: SELF-PAY

## 2023-10-19 VITALS
OXYGEN SATURATION: 97 % | RESPIRATION RATE: 18 BRPM | SYSTOLIC BLOOD PRESSURE: 131 MMHG | DIASTOLIC BLOOD PRESSURE: 73 MMHG | HEART RATE: 65 BPM | TEMPERATURE: 98.1 F

## 2023-10-19 PROCEDURE — G0299 HHS/HOSPICE OF RN EA 15 MIN: HCPCS

## 2023-10-19 ASSESSMENT — ENCOUNTER SYMPTOMS: HEMOPTYSIS: 0

## 2023-10-19 NOTE — HOSPICE
Patient received RNCM lying in bed. Patient condition remains with the addition of muscle cramps both at rest and with exertion. Conversation with TRA, BUZZ who suggested using the ativan and seeing if that helps. Caregiver agreed to try this. Patient is frustrated that she is not able to do as much as she used to. She is in process of understanding and accepting her disease process. No supplies or medications needed at this time.

## 2023-10-23 ENCOUNTER — HOME CARE VISIT (OUTPATIENT)
Facility: HOME HEALTH | Age: 88
End: 2023-10-23
Payer: SELF-PAY

## 2023-10-23 VITALS
DIASTOLIC BLOOD PRESSURE: 63 MMHG | RESPIRATION RATE: 16 BRPM | TEMPERATURE: 98.6 F | SYSTOLIC BLOOD PRESSURE: 105 MMHG | HEART RATE: 78 BPM | OXYGEN SATURATION: 99 %

## 2023-10-23 PROCEDURE — G0299 HHS/HOSPICE OF RN EA 15 MIN: HCPCS

## 2023-10-23 PROCEDURE — G0156 HHCP-SVS OF AIDE,EA 15 MIN: HCPCS

## 2023-10-23 ASSESSMENT — ENCOUNTER SYMPTOMS
PAIN LOCATION - PAIN QUALITY: ACHING
HEMOPTYSIS: 0

## 2023-10-23 NOTE — HOSPICE
Patient reports increasing fatigue and increasing tooth pain which makes it difficult to eat. RNCM recommended taking Tylenol or ibuprofen for pain before meals to help with tooth pain. Patient has a discharge from both eyes with redness in the lower lid of the right eye. Ordered erythromycin opthalmic ointment 7 day course per Jasmeet Dubon NP. Caregiver reports that patient had an episode of chest pain this week and they called triage which advised a dose of morphine and lorazapam. Patient refused the morphine but took the lorazapam. Symptoms resolved 15 minutes after taking the medication.

## 2023-10-25 ENCOUNTER — HOME CARE VISIT (OUTPATIENT)
Age: 88
End: 2023-10-25
Payer: SELF-PAY

## 2023-10-25 ENCOUNTER — HOME CARE VISIT (OUTPATIENT)
Facility: HOME HEALTH | Age: 88
End: 2023-10-25
Payer: SELF-PAY

## 2023-10-25 PROCEDURE — G0156 HHCP-SVS OF AIDE,EA 15 MIN: HCPCS

## 2023-10-26 ENCOUNTER — HOME CARE VISIT (OUTPATIENT)
Facility: HOME HEALTH | Age: 88
End: 2023-10-26
Payer: SELF-PAY

## 2023-10-26 VITALS
HEART RATE: 67 BPM | DIASTOLIC BLOOD PRESSURE: 64 MMHG | TEMPERATURE: 97.5 F | OXYGEN SATURATION: 96 % | SYSTOLIC BLOOD PRESSURE: 122 MMHG | RESPIRATION RATE: 16 BRPM

## 2023-10-26 PROCEDURE — G0299 HHS/HOSPICE OF RN EA 15 MIN: HCPCS

## 2023-10-26 ASSESSMENT — ENCOUNTER SYMPTOMS: HEMOPTYSIS: 0

## 2023-10-26 NOTE — HOSPICE
Pt greeted RNCM resting in her bed. Vital signs WNL, pt started erythromycon opthalmic ointment and eyes appear less red and watery. No acute or new pain reported. Medications ordered. Confirmation #: [id number]

## 2023-10-30 ENCOUNTER — HOME CARE VISIT (OUTPATIENT)
Facility: HOME HEALTH | Age: 88
End: 2023-10-30
Payer: SELF-PAY

## 2023-10-30 VITALS
DIASTOLIC BLOOD PRESSURE: 69 MMHG | RESPIRATION RATE: 16 BRPM | TEMPERATURE: 97.8 F | OXYGEN SATURATION: 98 % | HEART RATE: 74 BPM | SYSTOLIC BLOOD PRESSURE: 116 MMHG

## 2023-10-30 PROCEDURE — G0299 HHS/HOSPICE OF RN EA 15 MIN: HCPCS

## 2023-10-30 PROCEDURE — G0156 HHCP-SVS OF AIDE,EA 15 MIN: HCPCS

## 2023-10-30 ASSESSMENT — ENCOUNTER SYMPTOMS: HEMOPTYSIS: 0

## 2023-10-30 NOTE — HOSPICE
Patient resting in bed and greeted RNCM with a smile up arrival. Pt reports she is doing well. More fatigue reported by caregiver and that pt could not talk with guests for more than 2 hours before she had to go in her room and rest. Caregiver requested a portable O2 tank so they could take the patient to [employer]. Ordered from DME. No medications or supplies needed at this time.

## 2023-11-01 ENCOUNTER — HOME CARE VISIT (OUTPATIENT)
Facility: HOME HEALTH | Age: 88
End: 2023-11-01
Payer: SELF-PAY

## 2023-11-01 PROCEDURE — G0156 HHCP-SVS OF AIDE,EA 15 MIN: HCPCS

## 2023-11-03 ENCOUNTER — HOME CARE VISIT (OUTPATIENT)
Facility: HOME HEALTH | Age: 88
End: 2023-11-03
Payer: SELF-PAY

## 2023-11-03 VITALS
TEMPERATURE: 98.9 F | RESPIRATION RATE: 20 BRPM | OXYGEN SATURATION: 98 % | DIASTOLIC BLOOD PRESSURE: 69 MMHG | SYSTOLIC BLOOD PRESSURE: 118 MMHG | HEART RATE: 66 BPM

## 2023-11-03 PROCEDURE — G0299 HHS/HOSPICE OF RN EA 15 MIN: HCPCS

## 2023-11-03 ASSESSMENT — ENCOUNTER SYMPTOMS: HEMOPTYSIS: 0

## 2023-11-03 NOTE — HOSPICE
RNCM arrived and pt was sleeping in her room. She reports that she has had increased dyspnea on exertion when she is trying to walk from her room to the bathroom. SHe reports she has had more days where she is tired all day. RNCM discussed the benefits of a bedside commode for days when walking to the bathroom causes longer recovery periods for the pt. Pts caregiver states that she would never use a bedside commode even when she was in the hospital and could barely walk. Fall precautions discussed with Radha, pts caregiver.  supplies ordered Order Number : [id number]

## 2023-11-06 ENCOUNTER — HOME CARE VISIT (OUTPATIENT)
Facility: HOME HEALTH | Age: 88
End: 2023-11-06
Payer: SELF-PAY

## 2023-11-06 VITALS
RESPIRATION RATE: 20 BRPM | HEART RATE: 68 BPM | TEMPERATURE: 98 F | OXYGEN SATURATION: 98 % | SYSTOLIC BLOOD PRESSURE: 143 MMHG | DIASTOLIC BLOOD PRESSURE: 70 MMHG

## 2023-11-06 PROCEDURE — G0156 HHCP-SVS OF AIDE,EA 15 MIN: HCPCS

## 2023-11-06 PROCEDURE — G0299 HHS/HOSPICE OF RN EA 15 MIN: HCPCS

## 2023-11-06 PROCEDURE — G0155 HHCP-SVS OF CSW,EA 15 MIN: HCPCS

## 2023-11-06 ASSESSMENT — ENCOUNTER SYMPTOMS: HEMOPTYSIS: 0

## 2023-11-06 NOTE — HOSPICE
LMSW arrived at the patient's home to complete an Initial Psychosocial Assessment visit for [facility]. The LMSW was greeted at the front door by the patient's son Cherri Melgoza and his wife Radha was present. The home was clean and clutter free. The patient is a 81 y/o [address] [place] [address] female with a [facility] Dx. Metastatic malignant neoplasm of unknown primary site. The patient was received lying on her bed in her bedroom comfortably. The patient needs assistance with ambulation to the bathroom and was observed using O2 N C while in bed. Patient's appetite has decreased from last month. Patient wakes at night between 3:00 and 4:00 and takes naps during the day. Patient appeared pleasant and comfortable. Patient gave thanks for this visit. Cherri Melgoza was the POC for this visit. Cherri Melgoza reported that the patient was denied Medicaid due to documents not arriving on time from [facility]. Cherri Melgoza provided the needed documentation for the financial application and was informed that the current plan  10/31/23. Cherri Melgoza reported that he and his wife Radha are coping appropriately but would possibly like to use the [facility] for EOL. The patient was accepting of her prognosis and hospice support. LMSW revisited the community resources, provided emotional support, and supportive counseling related to EOL care.

## 2023-11-06 NOTE — HOSPICE
Pt resting in bed when RNCM arrived. RNCM greeted by Song Arenas, caregiver and pts son. Pt and caregiver report consistiently more fatigue and shortness of breath in the past 5-7 days compared to the past with longer episodes of recovery after ambulation. Discussed using wedge when in bed to reduce the work of breathing. Pt no in distress at this time and using albuterol 4x/day. RNCM advised the use of Lorazapam when pt has shortness of breath. Hospital bed discussed and will revisit topic at a later date. Pt attempts to ambulate when possible. Albuterol ordered.

## 2023-11-08 ENCOUNTER — HOME CARE VISIT (OUTPATIENT)
Facility: HOME HEALTH | Age: 88
End: 2023-11-08
Payer: SELF-PAY

## 2023-11-08 PROCEDURE — G0156 HHCP-SVS OF AIDE,EA 15 MIN: HCPCS

## 2023-11-10 ENCOUNTER — HOME CARE VISIT (OUTPATIENT)
Age: 88
End: 2023-11-10
Payer: SELF-PAY

## 2023-11-10 VITALS
SYSTOLIC BLOOD PRESSURE: 110 MMHG | HEART RATE: 76 BPM | DIASTOLIC BLOOD PRESSURE: 62 MMHG | TEMPERATURE: 98.3 F | RESPIRATION RATE: 24 BRPM

## 2023-11-10 PROCEDURE — G0299 HHS/HOSPICE OF RN EA 15 MIN: HCPCS

## 2023-11-13 ENCOUNTER — HOME CARE VISIT (OUTPATIENT)
Facility: HOME HEALTH | Age: 88
End: 2023-11-13
Payer: SELF-PAY

## 2023-11-13 VITALS
HEART RATE: 74 BPM | TEMPERATURE: 97.5 F | DIASTOLIC BLOOD PRESSURE: 51 MMHG | SYSTOLIC BLOOD PRESSURE: 103 MMHG | RESPIRATION RATE: 18 BRPM | OXYGEN SATURATION: 96 %

## 2023-11-13 PROCEDURE — G0299 HHS/HOSPICE OF RN EA 15 MIN: HCPCS

## 2023-11-13 ASSESSMENT — ENCOUNTER SYMPTOMS
HEMOPTYSIS: 0
PAIN LOCATION - PAIN QUALITY: ACHING

## 2023-11-13 NOTE — HOSPICE
Pt lying in bed when RNCM arrived. Tika Urrutia arrived at the same time and pt refused her visit from the [facility] today and said she would look forward to a visit on Wednesday. Pt reports having \"more plegm\", pain in her chest occasionally, and pain on her right flank area. Pts son Sujey Saxena reports they gave her the lorazapam after she was complaining of the pain and that it helped. Lungs clear and pt moving air well. Vital signs WNL. Discussed the plan of care with Sujey Ke and reviewed the medications in her comfort pack. Discussed privately with Sujey Hemanth that if the pain is not helped by the lorazapam that it is likely coming from the cancer rather than muscle spasms or cramps and to giver her the hydromorphone. I reviewed the pts diagnosis and the possiblity of more pain as her disease progresses. Sujey Saxena verbalized understanding.      Medications ordered: Confirmation #: [id number]

## 2023-11-15 ENCOUNTER — HOME CARE VISIT (OUTPATIENT)
Facility: HOME HEALTH | Age: 88
End: 2023-11-15
Payer: SELF-PAY

## 2023-11-15 PROCEDURE — G0156 HHCP-SVS OF AIDE,EA 15 MIN: HCPCS

## 2023-11-16 ENCOUNTER — HOME CARE VISIT (OUTPATIENT)
Age: 88
End: 2023-11-16
Payer: SELF-PAY

## 2023-11-16 ENCOUNTER — HOME CARE VISIT (OUTPATIENT)
Facility: HOME HEALTH | Age: 88
End: 2023-11-16
Payer: SELF-PAY

## 2023-11-16 VITALS — TEMPERATURE: 97.9 F | RESPIRATION RATE: 20 BRPM | OXYGEN SATURATION: 95 % | HEART RATE: 78 BPM

## 2023-11-16 PROCEDURE — G0299 HHS/HOSPICE OF RN EA 15 MIN: HCPCS

## 2023-11-16 ASSESSMENT — ENCOUNTER SYMPTOMS
COUGH CHARACTERISTICS: PRODUCTIVE
SPUTUM PRODUCTION: 1
COUGH: 1
SPUTUM COLOR: YELLOW
DYSPNEA ACTIVITY LEVEL: AT REST
SPUTUM AMOUNT: MODERATE
SPUTUM CONSISTENCY: THICK

## 2023-11-16 NOTE — HOSPICE
PRN RN visit due to patient's family calling hospice triage today to report that patient is experiencing increased cough with sputum production. Patient sitting up in bed upon arrival for visit; patient's daughter in law present. Patient is alert; hard of hearing. Patient reports increased dyspnea, cough and sputum production. Patient is currently on 2.5L O2 via NC. Patient's family reports that patient has been using the nebulizer Q4H. Lung sounds with wheezing throughout and crackles in left mid/lower lobes. Patient is expectorating thick yellow sputum. Kiel Maria NP notified; new orders received as follows: Zpack and Prednisone taper (To hold Decadron while taking Prednisone) Prednisone 60 mg daily X1 day, 50 mg daily X1 day, 40 mg daily X1 day, 30 mg daily X1 day, 20 mg daily X1 day, 10 mg daily X1 day, then stop Prednisone and restart Decadron 2 mg daily thereafter. Hospice triage to send to local pharmacy and have  deliver today. Patient's daughter in law verbalizes understanding of new orders. Reinforced hospice 24/7 for any concerns or change in patient's condition.

## 2023-11-17 ENCOUNTER — HOME CARE VISIT (OUTPATIENT)
Facility: HOME HEALTH | Age: 88
End: 2023-11-17
Payer: SELF-PAY

## 2023-11-17 VITALS
DIASTOLIC BLOOD PRESSURE: 54 MMHG | OXYGEN SATURATION: 99 % | RESPIRATION RATE: 22 BRPM | TEMPERATURE: 98.3 F | SYSTOLIC BLOOD PRESSURE: 109 MMHG | HEART RATE: 76 BPM

## 2023-11-17 PROCEDURE — G0299 HHS/HOSPICE OF RN EA 15 MIN: HCPCS

## 2023-11-17 ASSESSMENT — ENCOUNTER SYMPTOMS
HEMOPTYSIS: 0
SPUTUM AMOUNT: MODERATE
SPUTUM CONSISTENCY: THICK
DYSPNEA ACTIVITY LEVEL: WHILE SPEAKING
SPUTUM COLOR: YELLOW
SPUTUM PRODUCTION: 1

## 2023-11-17 NOTE — HOSPICE
Pt lying on her left side when RNCM arrived. Caregiver, Radha, reports that she is doing a little better since starting the antibiotic yesterday. Pt is visibly tired and turned over in the bed with some effort and SOB afterwards. Pt coughed up moderate about of yellow, thick phlegm while RNCM present. Radha reports that pt does best with exertion right after she has an albuterol treatment. Pt vital signs WNL and no supplies needed at this time. RNCM discussed the possibilty of pt having a virus and will evaluate effectiveness of medication regimen again at the Monday visit. Educated family on dx disease process as well what to expect as her body responds to either a virul or bacterial infection.

## 2023-11-20 ENCOUNTER — HOME CARE VISIT (OUTPATIENT)
Facility: HOME HEALTH | Age: 88
End: 2023-11-20
Payer: SELF-PAY

## 2023-11-20 VITALS
HEART RATE: 82 BPM | SYSTOLIC BLOOD PRESSURE: 115 MMHG | RESPIRATION RATE: 18 BRPM | TEMPERATURE: 98.4 F | OXYGEN SATURATION: 92 % | DIASTOLIC BLOOD PRESSURE: 65 MMHG

## 2023-11-20 PROCEDURE — G0156 HHCP-SVS OF AIDE,EA 15 MIN: HCPCS

## 2023-11-20 PROCEDURE — G0299 HHS/HOSPICE OF RN EA 15 MIN: HCPCS

## 2023-11-20 ASSESSMENT — ENCOUNTER SYMPTOMS
SPUTUM CONSISTENCY: THIN
SPUTUM PRODUCTION: 1
DYSPNEA ACTIVITY LEVEL: AT REST
HEMOPTYSIS: 0
SPUTUM COLOR: YELLOW
SPUTUM AMOUNT: MODERATE

## 2023-11-20 NOTE — HOSPICE
Pt greeted RNCM lying in bed and was cheerful reporting \"I feel much better\". Pt is on day 4 of 5 azythromycin for yellow, thick copious sputum. Sputum production is reduced and pt is only coughing when trying to do deep breathing. Crackles heard in uppder lung lobes and lower lobes decreased breath sounds. Pt devleoped edema in her feet and caregiver reports she has beed in bed more the past few days and also ate some restaurant food this week that could contributed to the edema. RNCM discussed the possible causes of edema and said we would monitor. RNCM informed pt and caregiver of time off this week and that their Friday visit would be done by another nurse and they verbalized understanding. Medications and supplies reviewed. More breifs ordered. Order Number : [id number] Recommendation for recertification will be presented on IDG tomorrow. Cancer Diagnoses       _____x___  A. Disease with distant metastases at presentation OR      ________  B. Progression from an earlier stage of disease to metastatic disease with either:                           ________  1. continued decline in spite of therapy                           ________  2. patient declines further disease directed therapy   1. Progression of disease as documented by worsening clinical status, symptoms, signs and laboratory results      A. Clinical Status   _____x___  1) Recurrent or intractable infections such as pneumonia, sepsis or upper urinary tract.                        2) Progressive inanition as documented by:                          ________  a) Weight loss not due to reversible causes such as depression or use of diuretics                       ________  b) Decreasing anthropomorphic measurements (mid-arm circumference,                                                               abdominal girth), not due to reversible causes such as depression or use of                                                                 diuretics

## 2023-11-22 ENCOUNTER — HOME CARE VISIT (OUTPATIENT)
Age: 88
End: 2023-11-22
Payer: SELF-PAY

## 2023-11-22 ENCOUNTER — HOME CARE VISIT (OUTPATIENT)
Facility: HOME HEALTH | Age: 88
End: 2023-11-22
Payer: SELF-PAY

## 2023-11-22 PROCEDURE — G0156 HHCP-SVS OF AIDE,EA 15 MIN: HCPCS

## 2023-11-24 ENCOUNTER — HOME CARE VISIT (OUTPATIENT)
Facility: HOME HEALTH | Age: 88
End: 2023-11-24
Payer: SELF-PAY

## 2023-11-24 PROCEDURE — G0299 HHS/HOSPICE OF RN EA 15 MIN: HCPCS

## 2023-11-26 VITALS
OXYGEN SATURATION: 95 % | HEART RATE: 86 BPM | DIASTOLIC BLOOD PRESSURE: 70 MMHG | RESPIRATION RATE: 18 BRPM | SYSTOLIC BLOOD PRESSURE: 120 MMHG | TEMPERATURE: 97.8 F

## 2023-11-27 ENCOUNTER — HOME CARE VISIT (OUTPATIENT)
Facility: HOME HEALTH | Age: 88
End: 2023-11-27
Payer: SELF-PAY

## 2023-11-27 VITALS
SYSTOLIC BLOOD PRESSURE: 132 MMHG | RESPIRATION RATE: 18 BRPM | OXYGEN SATURATION: 96 % | TEMPERATURE: 98 F | DIASTOLIC BLOOD PRESSURE: 68 MMHG | HEART RATE: 88 BPM

## 2023-11-27 PROCEDURE — G0299 HHS/HOSPICE OF RN EA 15 MIN: HCPCS

## 2023-11-27 PROCEDURE — G0156 HHCP-SVS OF AIDE,EA 15 MIN: HCPCS

## 2023-11-27 ASSESSMENT — ENCOUNTER SYMPTOMS: HEMOPTYSIS: 0

## 2023-11-27 NOTE — HOSPICE
Pt was lying in bed when RNCM arrived and she sat up and greeted RNCM. Pt appears improved since last visit with reduction in sputum production, reduction in fatigue, and more engagement with nursing visit. Pt reports increased muscle cramps in hands and legs below the knees. She reports a new symptom of intermittent numbness below the knees as well. RNCM suggested taking lorazapam before bed to see if this improves cramping overnight and continuing to use heat during the day. Reached out to [employer], NP to see if she has any other suggestions for relief. Pts edema in feet is improved this week with 1+ pitting edema in right foot. No changes to appetite. Medications and supplies ordered Confirmation #: [id number].  Supplies ordered Order Number : [id number]

## 2023-11-28 ENCOUNTER — HOME CARE VISIT (OUTPATIENT)
Age: 88
End: 2023-11-28
Payer: SELF-PAY

## 2023-11-28 NOTE — HOSPICE
has reached out for routine visit. Daughter- in - law deferred to next month because she( Radha) is ill.  will call back next month to access spiritual needs.

## 2023-11-29 ENCOUNTER — HOME CARE VISIT (OUTPATIENT)
Facility: HOME HEALTH | Age: 88
End: 2023-11-29
Payer: SELF-PAY

## 2023-11-29 PROCEDURE — G0156 HHCP-SVS OF AIDE,EA 15 MIN: HCPCS

## 2023-12-01 ENCOUNTER — HOME CARE VISIT (OUTPATIENT)
Facility: HOME HEALTH | Age: 88
End: 2023-12-01
Payer: SELF-PAY

## 2023-12-01 VITALS
SYSTOLIC BLOOD PRESSURE: 109 MMHG | HEART RATE: 94 BPM | TEMPERATURE: 98 F | RESPIRATION RATE: 20 BRPM | DIASTOLIC BLOOD PRESSURE: 68 MMHG | OXYGEN SATURATION: 94 %

## 2023-12-01 PROCEDURE — G0299 HHS/HOSPICE OF RN EA 15 MIN: HCPCS

## 2023-12-01 ASSESSMENT — ENCOUNTER SYMPTOMS
PAIN LOCATION - PAIN QUALITY: ACHING
HEMOPTYSIS: 0

## 2023-12-01 NOTE — HOSPICE
Pt was sitting on the side of the bed and had just returned from the bathroom. Her feet have some increased edema and she reports pain in her feet. She reports itching skin on her lower legs and there is visible dry skin and cracked areas where she has scratched. RNCM applied thick lotion to legs and feet and recommended hydrocortisone OTC cream for ithcy areas and applying thick cream a few times a day. Pt runs a portable heater in the room as well as a humidifier. PT reports a pain on the inside of her right cheek where there appears to be a small sore. RNCM suggested using Tylenol or Motrin for pain control until the sore resolves or purchasing orajel OTC for topical pain relief. Vitals signs WNL and no medication or supply needs at this time.

## 2023-12-04 ENCOUNTER — HOME CARE VISIT (OUTPATIENT)
Facility: HOME HEALTH | Age: 88
End: 2023-12-04
Payer: SELF-PAY

## 2023-12-04 VITALS
DIASTOLIC BLOOD PRESSURE: 66 MMHG | HEART RATE: 72 BPM | TEMPERATURE: 97.8 F | OXYGEN SATURATION: 98 % | SYSTOLIC BLOOD PRESSURE: 98 MMHG | RESPIRATION RATE: 18 BRPM

## 2023-12-04 PROCEDURE — G0299 HHS/HOSPICE OF RN EA 15 MIN: HCPCS

## 2023-12-04 PROCEDURE — G0156 HHCP-SVS OF AIDE,EA 15 MIN: HCPCS

## 2023-12-04 ASSESSMENT — ENCOUNTER SYMPTOMS
PAIN LOCATION - PAIN QUALITY: CRAMPS
HEMOPTYSIS: 0

## 2023-12-04 NOTE — HOSPICE
Pt has a new skin rash between the folds of her abdomen and groin. Nystatin powder ordered per Maxi Torres NP Confirmation #: [id number]. Pt also reports that she took lorazapam for muslce cramps and it helped but she still had pain. RNCM suggested taking lorazapam at night before bed and taking Tylenold or ibuprofen with the lorazapam to help with pain and muscle cramps at night. Pt prefers to use heat therapy during the day to help with cramps but verbalized understanding. Spoke with pts son, Aris Pickering, about this as well and he said they would encourage her to try that. Pt has dry cracked skin on her lower legs and thick cream being applied to help with discomfort associated with the dry skin.

## 2023-12-06 ENCOUNTER — HOME CARE VISIT (OUTPATIENT)
Facility: HOME HEALTH | Age: 88
End: 2023-12-06
Payer: SELF-PAY

## 2023-12-06 PROCEDURE — G0156 HHCP-SVS OF AIDE,EA 15 MIN: HCPCS

## 2023-12-08 ENCOUNTER — HOME CARE VISIT (OUTPATIENT)
Facility: HOME HEALTH | Age: 88
End: 2023-12-08
Payer: SELF-PAY

## 2023-12-08 VITALS
OXYGEN SATURATION: 98 % | HEART RATE: 82 BPM | SYSTOLIC BLOOD PRESSURE: 130 MMHG | TEMPERATURE: 97.6 F | RESPIRATION RATE: 16 BRPM | DIASTOLIC BLOOD PRESSURE: 83 MMHG

## 2023-12-08 PROCEDURE — G0299 HHS/HOSPICE OF RN EA 15 MIN: HCPCS

## 2023-12-08 ASSESSMENT — ENCOUNTER SYMPTOMS: HEMOPTYSIS: 0

## 2023-12-08 NOTE — HOSPICE
Pt sitting in bed without dyspnea and speaking a lot without SOB. Pt reports more phlegm this week but her chief complaint is her lower legs continue to itch. RNCM used honey colloidal ointment to help heal the dry skin and suggested using OTC hydrocortisone cream at night when the itching is the worst. Will reevaluate at next encounter. Pt reports that she is taking the lorazapam at night and has not suffered as many muscle cramps overnight and has been sleeping much better. Occassional dependent edema in feet present. No medications or supplies needed at this time.

## 2023-12-11 ENCOUNTER — HOME CARE VISIT (OUTPATIENT)
Facility: HOME HEALTH | Age: 88
End: 2023-12-11
Payer: SELF-PAY

## 2023-12-11 ENCOUNTER — HOME CARE VISIT (OUTPATIENT)
Age: 88
End: 2023-12-11
Payer: SELF-PAY

## 2023-12-11 VITALS
RESPIRATION RATE: 18 BRPM | DIASTOLIC BLOOD PRESSURE: 82 MMHG | OXYGEN SATURATION: 99 % | TEMPERATURE: 97.7 F | HEART RATE: 84 BPM | SYSTOLIC BLOOD PRESSURE: 137 MMHG

## 2023-12-11 PROCEDURE — G0299 HHS/HOSPICE OF RN EA 15 MIN: HCPCS

## 2023-12-11 PROCEDURE — G0156 HHCP-SVS OF AIDE,EA 15 MIN: HCPCS

## 2023-12-11 ASSESSMENT — ENCOUNTER SYMPTOMS
PAIN LOCATION - PAIN QUALITY: ACHING
HEMOPTYSIS: 0

## 2023-12-11 NOTE — HOSPICE
Pt lying in bed when RNCM arrived. She had just received her bath from [facility] and was tired. Pts legs look worse today (more redness and excoriation from itching). Spoke to Daisy Sims NP and ordered ammonium lactate 12% lotion for pt. Advised pt and family to keep blankets and sheets clean, to bathe regularly and to keep lotion on the legs until prescription lotion arrives tomorrow. Pt complains of left ear and cheek pain. 7-day course of  Keflex ordered per Dasiy Sims NP. PTs vital signs WNL. Supplies and medications ordered.

## 2023-12-12 ENCOUNTER — HOME CARE VISIT (OUTPATIENT)
Facility: HOME HEALTH | Age: 88
End: 2023-12-12
Payer: SELF-PAY

## 2023-12-12 PROCEDURE — G0155 HHCP-SVS OF CSW,EA 15 MIN: HCPCS

## 2023-12-12 NOTE — HOSPICE
LMSW arrived at the patient's home to complete a routine visit for [facility]. The LMSW was greeted at the front door by the patient's son Song Arenas and his wife Radha was present. The home was clean and clutter free. The patient is a 79 y/o [address] [place] [address] female with a [facility] Dx. Metastatic malignant neoplasm of unknown primary site. The patient was received sleeping on her bed in her bedroom comfortably. The patient needs assistance with ambulation to the bathroom. Patient's appetite has decreased from last month. Patient wakes at night between 3:00 and 4:00 and takes naps during the day. Patient appeared pleasant and comfortable. Song Arenas was the POC for this visit. Sogn Arenas reported that the patient was denied Medicaid due to documents not arriving on time from [facility]. Song Arenas and this LMSW contacted [facility] and left a VM for Mrs. Faith Galeakira and Mrs. Corona for an update on this application. [facility] contacted [facility] due to Song Arenas receiving an application. [facility] provided another application to Missael Johnson. Song Arenas reported that he and his wife Radha are coping appropriately but would possibly like to use the [facility] for EOL. The patient was accepting of her prognosis and hospice support. SW provided emotional support, and supportive counseling related to EOL care. Song Arenas gave thanks for this visit.

## 2023-12-13 ENCOUNTER — HOME CARE VISIT (OUTPATIENT)
Facility: HOME HEALTH | Age: 88
End: 2023-12-13
Payer: SELF-PAY

## 2023-12-13 PROCEDURE — G0156 HHCP-SVS OF AIDE,EA 15 MIN: HCPCS

## 2023-12-15 ENCOUNTER — HOME CARE VISIT (OUTPATIENT)
Facility: HOME HEALTH | Age: 88
End: 2023-12-15
Payer: SELF-PAY

## 2023-12-15 PROCEDURE — G0299 HHS/HOSPICE OF RN EA 15 MIN: HCPCS

## 2023-12-17 VITALS
DIASTOLIC BLOOD PRESSURE: 61 MMHG | SYSTOLIC BLOOD PRESSURE: 147 MMHG | HEART RATE: 76 BPM | TEMPERATURE: 96.9 F | RESPIRATION RATE: 18 BRPM | OXYGEN SATURATION: 97 %

## 2023-12-27 ENCOUNTER — HOME CARE VISIT (OUTPATIENT)
Facility: HOME HEALTH | Age: 88
End: 2023-12-27
Payer: SELF-PAY

## 2023-12-27 VITALS
RESPIRATION RATE: 20 BRPM | DIASTOLIC BLOOD PRESSURE: 79 MMHG | HEART RATE: 93 BPM | SYSTOLIC BLOOD PRESSURE: 126 MMHG | TEMPERATURE: 98.1 F | OXYGEN SATURATION: 99 %

## 2023-12-27 PROCEDURE — G0299 HHS/HOSPICE OF RN EA 15 MIN: HCPCS

## 2023-12-27 PROCEDURE — G0156 HHCP-SVS OF AIDE,EA 15 MIN: HCPCS

## 2023-12-27 NOTE — HOSPICE
Pt lying in bed when RNCM arrived. Pts legs are inflamed and warm. Paula Pablo reports that pt was rubbing them with wipes to scratch them because they were itching and they got red and swollen. Pictures sent to Arnaldo Drake NP and new orders received for 5 day course of prednisone. Radha, DIL, instructed to give benedryl 50mg at night to ease the itching and use hydrocortisone during the day as well as the ammonium lactate 12% (more ordered from [facility] as well as albuterol Confirmation #: [id number]  ). No other complaints or changes of note. RNCM called [place] who will be seeing the pt on Friday to update him on changes and interventions to be evaluated.

## 2023-12-29 ENCOUNTER — HOME CARE VISIT (OUTPATIENT)
Age: 88
End: 2023-12-29
Payer: SELF-PAY

## 2023-12-29 ENCOUNTER — HOME CARE VISIT (OUTPATIENT)
Facility: HOME HEALTH | Age: 88
End: 2023-12-29
Payer: SELF-PAY

## 2023-12-29 PROCEDURE — G0299 HHS/HOSPICE OF RN EA 15 MIN: HCPCS

## 2023-12-30 VITALS
TEMPERATURE: 97.6 F | SYSTOLIC BLOOD PRESSURE: 120 MMHG | DIASTOLIC BLOOD PRESSURE: 68 MMHG | HEART RATE: 88 BPM | RESPIRATION RATE: 18 BRPM | OXYGEN SATURATION: 100 %

## 2023-12-30 NOTE — HOSPICE
Daughter in law Radha present for visit. Patient A&Ox4, vital signs WNL. laying in bed. Pitting pedal edema and lower leg edema with redness, intense itching, and pain with touch persist.  Prednisone for this issue was just received yesterday and first administered today. Hydrocortisone cream and ammonium lactate lotion are being used with some benefit experienced. Benadryl has been ordered but has not been used. Patient and daughter in law were encouraged to use Benadryl, and will monitor condition for response to prednisone over the weekend. If symptoms worsen, they will call for assistance. Mouth discomfort reported, with white reside observed in significant area of oral cavity. Diflucan started. Light crackles auscultated in upper lobes. Nebulizer treatment is used 4 times daily. Medication ordered with delivery by Triage. No other changes nor needs. All encouraged to call as needed.

## 2024-01-01 ENCOUNTER — HOME CARE VISIT (OUTPATIENT)
Age: 89
End: 2024-01-01

## 2024-01-01 ENCOUNTER — HOME CARE VISIT (OUTPATIENT)
Facility: HOME HEALTH | Age: 89
End: 2024-01-01

## 2024-01-01 ENCOUNTER — HOME CARE VISIT (OUTPATIENT)
Facility: HOME HEALTH | Age: 89
End: 2024-01-01
Payer: SELF-PAY

## 2024-01-01 VITALS
DIASTOLIC BLOOD PRESSURE: 70 MMHG | HEART RATE: 93 BPM | RESPIRATION RATE: 22 BRPM | TEMPERATURE: 98.3 F | OXYGEN SATURATION: 95 % | SYSTOLIC BLOOD PRESSURE: 120 MMHG

## 2024-01-01 PROCEDURE — G0156 HHCP-SVS OF AIDE,EA 15 MIN: HCPCS

## 2024-01-01 PROCEDURE — G0299 HHS/HOSPICE OF RN EA 15 MIN: HCPCS

## 2024-01-01 PROCEDURE — G0300 HHS/HOSPICE OF LPN EA 15 MIN: HCPCS

## 2024-01-01 ASSESSMENT — ENCOUNTER SYMPTOMS
HEMOPTYSIS: 0
PAIN LOCATION - PAIN QUALITY: ACHING

## 2024-01-02 ENCOUNTER — HOME CARE VISIT (OUTPATIENT)
Age: 89
End: 2024-01-02
Payer: SELF-PAY

## 2024-01-02 VITALS
HEART RATE: 88 BPM | SYSTOLIC BLOOD PRESSURE: 109 MMHG | RESPIRATION RATE: 18 BRPM | DIASTOLIC BLOOD PRESSURE: 65 MMHG | TEMPERATURE: 99.4 F | OXYGEN SATURATION: 98 %

## 2024-01-02 PROCEDURE — G0299 HHS/HOSPICE OF RN EA 15 MIN: HCPCS

## 2024-01-02 ASSESSMENT — ENCOUNTER SYMPTOMS: DYSPNEA ACTIVITY LEVEL: AT REST

## 2024-01-02 NOTE — HOSPICE
visit made for c/o severe pain in both legs from calf to foot.  pitting edema 3+ in legs and feet, both legs with rash.  spoke with Liz Ray NP, sent photos of legs.  doxycycline hyclate 100 mg BID x 7 days ordered for pt to start to today. reviewed giving pt hydromorphone for high level of pain. pt refuses to take med at this time. states if it gets worse, she'll take it tonight. she is taking acetaminophen for pain with little relief. reminded pt and daughter in law to call hospice for any needs

## 2024-01-03 ENCOUNTER — HOME CARE VISIT (OUTPATIENT)
Facility: HOME HEALTH | Age: 89
End: 2024-01-03
Payer: SELF-PAY

## 2024-01-03 VITALS
DIASTOLIC BLOOD PRESSURE: 66 MMHG | TEMPERATURE: 99 F | OXYGEN SATURATION: 99 % | SYSTOLIC BLOOD PRESSURE: 114 MMHG | RESPIRATION RATE: 18 BRPM | HEART RATE: 92 BPM

## 2024-01-03 PROCEDURE — G0299 HHS/HOSPICE OF RN EA 15 MIN: HCPCS

## 2024-01-03 PROCEDURE — G0156 HHCP-SVS OF AIDE,EA 15 MIN: HCPCS

## 2024-01-03 ASSESSMENT — ENCOUNTER SYMPTOMS
SPUTUM PRODUCTION: 1
SPUTUM COLOR: YELLOW
PAIN LOCATION - PAIN QUALITY: ACHING
HEMOPTYSIS: 0
SPUTUM AMOUNT: SCANT
COUGH CHARACTERISTICS: PRODUCTIVE
DYSPNEA ACTIVITY LEVEL: AFTER AMBULATING LESS THAN 10 FT
SPUTUM CONSISTENCY: THICK
COUGH: 1

## 2024-01-03 NOTE — HOSPICE
Pt reports the itching on legs is gone. Lower legs continue to have erythema and edema. Pt reports she has pain when she tries to walk. RNCM educated pt on taking pain killer to reduce pain before going to the bathroom or trying to walk. Walker ordered Delivery Order number [id number]    Medications ordered Confirmation #: [id number]    Supplies ordered Order Number : [id number] Subjective:      Patient ID: Trevon Gordon  is a 78 y o  male  Wound check for abscess on the right side  Packing came out after 1 day and area has closed  No significant drainage  Less intensely red      Past Medical History:   Diagnosis Date   • Gait disturbance 11/6/2013   • Lipoma     last assessed: 1/23/2015       Family History   Problem Relation Age of Onset   • No Known Problems Mother    • Stroke Family         ischemic       Past Surgical History:   Procedure Laterality Date   • CERVICAL FUSION      vertebral        reports that he has quit smoking  His smokeless tobacco use includes chew  He reports that he does not drink alcohol and does not use drugs  Current Outpatient Medications:   •  amLODIPine-benazepril (LOTREL) 10-40 MG per capsule, Take 1 capsule by mouth daily, Disp: 90 capsule, Rfl: 1  •  aspirin 81 MG tablet, Take 1 tablet by mouth daily, Disp: , Rfl:   •  baclofen 10 mg tablet, TAKE 1 TABLET BY MOUTH THREE TIMES A DAY, Disp: 270 tablet, Rfl: 1  •  metoprolol tartrate (LOPRESSOR) 100 mg tablet, Take 1 tablet (100 mg total) by mouth 2 (two) times a day, Disp: 180 tablet, Rfl: 1  •  sulfamethoxazole-trimethoprim (BACTRIM DS) 800-160 mg per tablet, Take 1 tablet by mouth every 12 (twelve) hours for 10 days, Disp: 20 tablet, Rfl: 0    The following portions of the patient's history were reviewed and updated as appropriate: allergies, current medications, past family history, past medical history, past social history, past surgical history and problem list     Review of Systems        Objective:    Temp (!) 97 4 °F (36 3 °C) (Tympanic)      Physical Exam  Vitals and nursing note reviewed  Skin:     Findings: Lesion ( 4 cm raised, erythematous skin lesion appears to be consistent with an abscess) present  No results found for this or any previous visit (from the past 1008 hour(s))  Assessment/Plan:    Abscess of back  Incision again performed and packing placed  Mostly bloody drainage  Patient will continue on Bactrim DS and will return in 3 to 4 days for packing change and reevaluation          Problem List Items Addressed This Visit        Other    Abscess of back - Primary     Incision again performed and packing placed  Mostly bloody drainage    Patient will continue on Bactrim DS and will return in 3 to 4 days for packing change and reevaluation

## 2024-01-05 ENCOUNTER — HOME CARE VISIT (OUTPATIENT)
Facility: HOME HEALTH | Age: 89
End: 2024-01-05
Payer: SELF-PAY

## 2024-01-05 VITALS
DIASTOLIC BLOOD PRESSURE: 63 MMHG | SYSTOLIC BLOOD PRESSURE: 115 MMHG | OXYGEN SATURATION: 95 % | HEART RATE: 97 BPM | RESPIRATION RATE: 18 BRPM | TEMPERATURE: 98.3 F

## 2024-01-05 PROCEDURE — G0299 HHS/HOSPICE OF RN EA 15 MIN: HCPCS

## 2024-01-05 ASSESSMENT — ENCOUNTER SYMPTOMS
HEMOPTYSIS: 0
PAIN LOCATION - PAIN QUALITY: ACHING

## 2024-01-05 NOTE — HOSPICE
Pt lying in new hospital bed when RNCM arrived with feet elevated. Pt appears fatigued and not as engaged as usual. Pt reports legs are not as ithcy as they used to be but the swelling and pain are very bad. A few areas on both feet have begun to weep with tight edema. Liz Ray NP called and new orders for lasix 20mg daily for 3 days ordered to local pharmacy. Educated Danile, son and Radha, daughter on possible causes of edema and that if the edema does not respond to the lasix is it possible that it is caused by lymphedema. Educated family on disease progression. Pain control recommended.

## 2024-01-06 ENCOUNTER — HOME CARE VISIT (OUTPATIENT)
Age: 89
End: 2024-01-06
Payer: SELF-PAY

## 2024-01-08 ENCOUNTER — HOME CARE VISIT (OUTPATIENT)
Facility: HOME HEALTH | Age: 89
End: 2024-01-08
Payer: SELF-PAY

## 2024-01-08 VITALS
OXYGEN SATURATION: 99 % | RESPIRATION RATE: 20 BRPM | SYSTOLIC BLOOD PRESSURE: 136 MMHG | TEMPERATURE: 98.2 F | HEART RATE: 96 BPM | DIASTOLIC BLOOD PRESSURE: 74 MMHG

## 2024-01-08 PROCEDURE — G0156 HHCP-SVS OF AIDE,EA 15 MIN: HCPCS

## 2024-01-08 PROCEDURE — G0299 HHS/HOSPICE OF RN EA 15 MIN: HCPCS

## 2024-01-08 ASSESSMENT — ENCOUNTER SYMPTOMS
HEMOPTYSIS: 0
PAIN LOCATION - PAIN QUALITY: ACHING

## 2024-01-08 NOTE — HOSPICE
Pt reports doing much better over the weekend. Feet edema responding to higher elevation. Both lower legs skin peeling. RNCM advised to keep elevating legs when she is seated or lying in bed and to keep skin moisturized. RNCM instructed pt to have foods or shakes with protein to keep fluid in vessels and made food and protein shake suggestions. Vitals signs WNL and pt says pain in feet is greatly reduced with swelling going down. She reports no itchiness and there is currently no inflammation.

## 2024-01-09 ENCOUNTER — HOME CARE VISIT (OUTPATIENT)
Facility: HOME HEALTH | Age: 89
End: 2024-01-09
Payer: SELF-PAY

## 2024-01-09 PROCEDURE — G0155 HHCP-SVS OF CSW,EA 15 MIN: HCPCS

## 2024-01-09 NOTE — HOSPICE
LMSW arrived at the patient's home to complete a routine visit for [facility]. The LMSW was greeted at the front door by the patient's son Abdulaziz and his wife Radha was present. The home was clean and clutter free. The patient is a 89 y/o [place]-  female with a [facility] Dx. Metastatic malignant neoplasm of unknown primary site. The patient was received lying on her bed in her bedroom comfortably. The patient needs assistance with ambulation to the bathroom. Patient's appetite has decreased from last month. Patient wakes at night between 3:00 and 4:00 and takes naps during the day. Patient appeared pleasant and comfortable. Abdulaziz was the POC for this visit. Abdulaziz reported that the patient had received full Medicaid benefits and would like Radha to be the paid caregiver. Abdulaziz and this LMSW contacted [facility] and left a VM for Mrs. Boothe and Mrs. Corona for an update on this application. [facility] contacted [facility] due to Abdulaziz receiving an application. [facility] provided another application to informed Abdulaziz that the FAP has been approved but Medicaid will be patient's hospice bills. Abdulaziz reported that he and his wife Radha are coping appropriately but would possibly like to use the [facility] for EOL. The patient was accepting of her prognosis and hospice support. LMSW provided emotional support, and supportive counseling related to EOL care. Abdulaziz gave thanks for this visit.

## 2024-01-10 ENCOUNTER — HOME CARE VISIT (OUTPATIENT)
Age: 89
End: 2024-01-10
Payer: SELF-PAY

## 2024-01-10 PROCEDURE — G0156 HHCP-SVS OF AIDE,EA 15 MIN: HCPCS

## 2024-01-12 ENCOUNTER — HOME CARE VISIT (OUTPATIENT)
Facility: HOME HEALTH | Age: 89
End: 2024-01-12
Payer: SELF-PAY

## 2024-01-12 PROCEDURE — G0299 HHS/HOSPICE OF RN EA 15 MIN: HCPCS

## 2024-01-12 PROCEDURE — G0156 HHCP-SVS OF AIDE,EA 15 MIN: HCPCS

## 2024-01-14 VITALS
HEART RATE: 75 BPM | DIASTOLIC BLOOD PRESSURE: 61 MMHG | TEMPERATURE: 98.8 F | OXYGEN SATURATION: 97 % | SYSTOLIC BLOOD PRESSURE: 116 MMHG | RESPIRATION RATE: 16 BRPM

## 2024-01-15 ENCOUNTER — HOME CARE VISIT (OUTPATIENT)
Facility: HOME HEALTH | Age: 89
End: 2024-01-15

## 2024-01-15 PROCEDURE — G0156 HHCP-SVS OF AIDE,EA 15 MIN: HCPCS

## 2024-01-15 PROCEDURE — G0299 HHS/HOSPICE OF RN EA 15 MIN: HCPCS

## 2024-01-15 ASSESSMENT — ENCOUNTER SYMPTOMS
STOOL DESCRIPTION: FORMED
HEMOPTYSIS: 0

## 2024-01-15 NOTE — HOSPICE
Pt edema and pain in feet have resolved. Pt reports no itching and no pain in feet. She is feeling much better this week. She continues to elevate feet when in bed and use moisturizing lotion on lower legs for peeling skin. No other needs at this time.

## 2024-01-17 ENCOUNTER — HOME CARE VISIT (OUTPATIENT)
Facility: HOME HEALTH | Age: 89
End: 2024-01-17

## 2024-01-17 PROCEDURE — G0156 HHCP-SVS OF AIDE,EA 15 MIN: HCPCS

## 2024-01-18 ENCOUNTER — HOME CARE VISIT (OUTPATIENT)
Facility: HOME HEALTH | Age: 89
End: 2024-01-18

## 2024-01-18 PROCEDURE — G0299 HHS/HOSPICE OF RN EA 15 MIN: HCPCS

## 2024-01-19 ENCOUNTER — HOME CARE VISIT (OUTPATIENT)
Facility: HOME HEALTH | Age: 89
End: 2024-01-19

## 2024-01-19 VITALS
SYSTOLIC BLOOD PRESSURE: 126 MMHG | TEMPERATURE: 97.6 F | RESPIRATION RATE: 18 BRPM | OXYGEN SATURATION: 100 % | HEART RATE: 76 BPM | DIASTOLIC BLOOD PRESSURE: 71 MMHG

## 2024-01-19 PROCEDURE — G0156 HHCP-SVS OF AIDE,EA 15 MIN: HCPCS

## 2024-01-19 NOTE — HOSPICE
Patient A&O3 resting comfortably in bed.  Son and daughter in law are present for visit.  Ms. Shrestha is breathing comfortably at a normal rate with supplemental oxygen with clear upper and decreased lower lungs sounds.  She is using nebulizer treatment 4 times daily.  She currently has no cough.  Pain and swelling in lower legs and feet has resolved, with some dry skin still sloughing off.  Skin is moisturized regularly.  Patient gets up out of bed regularly and ambultes to common area for meals.  Abdulaziz was encouraged to call with any changes or needs.  Solution for nebulizetion ordered.

## 2024-01-22 ENCOUNTER — HOME CARE VISIT (OUTPATIENT)
Facility: HOME HEALTH | Age: 89
End: 2024-01-22

## 2024-01-22 PROCEDURE — G0299 HHS/HOSPICE OF RN EA 15 MIN: HCPCS

## 2024-01-22 PROCEDURE — G0156 HHCP-SVS OF AIDE,EA 15 MIN: HCPCS

## 2024-01-23 VITALS
OXYGEN SATURATION: 95 % | HEART RATE: 84 BPM | DIASTOLIC BLOOD PRESSURE: 72 MMHG | SYSTOLIC BLOOD PRESSURE: 130 MMHG | TEMPERATURE: 98.2 F | RESPIRATION RATE: 18 BRPM

## 2024-01-23 ASSESSMENT — ENCOUNTER SYMPTOMS
COUGH: 1
COUGH CHARACTERISTICS: DRY

## 2024-01-23 NOTE — HOSPICE
Patient A&Ox4 with son Abdulaziz and daughter in law Radha present.  Patient denies pain, vital signs WNL, reports using nebulizer 3-4 times daily with noticeable results.  Legs swelling has completely subsided.  Dry, unproductive cough reported and observed - Delsym liquid started and ordered.  No other changes nor needs.  All encouraged to call as needed.      NEW MEDICATION INITIATION DOCUMENTATION:  Consulted AT MD to report change in patient status: yes,   Obtained Order from Provider for initiation of Symptom relief medication / other medication needed:yes,    Documentation completed in Telephone/Visit Note in [facility]  Reason medication is being initiated:new symptom  MD / Provider name consulted re: change in status / initiation of new medication:Liz Ray NP  New Symptom(s): dry, unproductive cough  New Order(s): Dextromethorphan liquid 30mg/5ml - take 10ml/60mg every 12 hours as needed for cough  Name of person being taught: Abdulaziz Zayas  Instructions given: yes,   Side Effects taught:yes,   Response to teaching: verbalized understanding

## 2024-01-24 ENCOUNTER — HOME CARE VISIT (OUTPATIENT)
Facility: HOME HEALTH | Age: 89
End: 2024-01-24

## 2024-01-24 PROCEDURE — G0156 HHCP-SVS OF AIDE,EA 15 MIN: HCPCS

## 2024-01-26 ENCOUNTER — HOME CARE VISIT (OUTPATIENT)
Facility: HOME HEALTH | Age: 89
End: 2024-01-26

## 2024-01-26 VITALS
RESPIRATION RATE: 22 BRPM | DIASTOLIC BLOOD PRESSURE: 76 MMHG | TEMPERATURE: 98 F | OXYGEN SATURATION: 98 % | SYSTOLIC BLOOD PRESSURE: 136 MMHG | HEART RATE: 98 BPM

## 2024-01-26 PROCEDURE — G0299 HHS/HOSPICE OF RN EA 15 MIN: HCPCS

## 2024-01-27 NOTE — HOSPICE
PAtient A&Ox3, denies pain, is observed with an elevated respiratory rate with shallow breathing.  She is not anxious.  She is not wearing oxygen, and is laying flat.  Patient elevated with supplemental oxygen added - oxygen saturation increased and breathing rate slowed.  Wheezing auscultated, patient and daughter in law Radha encouraged to have atient use nebulizer 4 times daily.  No swelling in feet or legs.  No medication or supply needs.  Patient and daughter in law encouraged to call with any changes or needs.

## 2024-01-29 ENCOUNTER — HOME HEALTH/ HOSPICE FACE TO FACE (OUTPATIENT)
Age: 89
End: 2024-01-29

## 2024-01-29 ENCOUNTER — HOME CARE VISIT (OUTPATIENT)
Facility: HOME HEALTH | Age: 89
End: 2024-01-29

## 2024-01-29 VITALS
RESPIRATION RATE: 20 BRPM | HEART RATE: 92 BPM | TEMPERATURE: 97.9 F | OXYGEN SATURATION: 100 % | SYSTOLIC BLOOD PRESSURE: 131 MMHG | DIASTOLIC BLOOD PRESSURE: 73 MMHG

## 2024-01-29 PROCEDURE — G0299 HHS/HOSPICE OF RN EA 15 MIN: HCPCS

## 2024-01-29 PROCEDURE — G0156 HHCP-SVS OF AIDE,EA 15 MIN: HCPCS

## 2024-01-29 ASSESSMENT — ENCOUNTER SYMPTOMS
COUGH: 1
COUGH CHARACTERISTICS: PRODUCTIVE
HEMOPTYSIS: 0

## 2024-01-29 NOTE — HOSPICE
Pt resting in bed upon RNCM and Dr. Urbano arrival. Family and pt report that they have viral sickness in the home and that their 5 year old is vomitting. Pt reports she has been coughing for 3 days and it was very bad last night. Right eye swollen and red with purulent discharge. Dr. Urbano instructed pt to use the erythromycin opthalmic ointment and instructed RNCM to order more. Abdulaziz reports pt has had reduced appetite the past 5 days. Pt fatigues on ambulation or activity of any kind. Pt able to ambulate to the bathroom with the use of the walker and assistance from a family member. Will monitor respiratory symptoms and reasses need for oral antibiotics on Friday. Medication refills ordered.

## 2024-01-29 NOTE — FACE TO FACE
[facility]   Good Help to Those in Need  FACE TO FACE ENCOUNTER  [phone]    Patient Name: Fito Shrestha  YOB: 1928    Date of Face to Face Visit: 01/29/24    Location of Visit:  [] [facility] [] [facility] [] [facility] [] [facility] [] Hospice House ([facility])  [x] Home [] Other:      Principle Hospice Diagnosis: Metastatic breast cancer  Related Hospice diagnosis: ILD, HTN, Seizure disorder    Benefit period number: 3  First day of this BP benefit period: 1/29/2024     HOSPICE SUMMARY      Fito Shrestha is a 89 y.o. year old who is being evaluated for re-certification for Hospice services. Since admission to Hospice on 9/18/2023 for metastatic breast cancer, the patient has demonstrated the following signs/symptoms: she's overall weaker and now spends most of her time in bed. She's now sleeping in a hospital bed which was delivered within this past recert period. She's also now requiring a walker for ambulation. She requires assistance with dressing and bathing. She's continent og bladder and bowel. She can no longer prepare her meals or keep up with her medications on her own. She walks only short distances from her bed to the bathroom and back with family at her side. Her son tries to get her to walk to the dining room table once daily to sit up for awhile. She developed new bilateral lower extremity edema 3 weeks ago with significant weeping and overlying dermatitis. This has since resolved with deliverance of the hospital bed which allows her to keep her legs elevated and with a course of antibiotic therapy. She was treated for an eye infection several months ago and now has new redness and drainage from her right eye. She has a chronic baseline cough and continues to use her nebulizer 3 times daily. She now becomes short of breath and experiences palpations when she walks. She's had worsening cough with production of clear sputum for the past 3 to 4 days. She uses guaifenesin cough syrup

## 2024-01-30 ENCOUNTER — HOME CARE VISIT (OUTPATIENT)
Facility: HOME HEALTH | Age: 89
End: 2024-01-30

## 2024-01-30 VITALS
HEART RATE: 96 BPM | TEMPERATURE: 97.6 F | SYSTOLIC BLOOD PRESSURE: 146 MMHG | DIASTOLIC BLOOD PRESSURE: 78 MMHG | RESPIRATION RATE: 26 BRPM

## 2024-01-30 PROCEDURE — G0299 HHS/HOSPICE OF RN EA 15 MIN: HCPCS

## 2024-01-30 ASSESSMENT — ENCOUNTER SYMPTOMS
SPUTUM CONSISTENCY: THICK
SPUTUM AMOUNT: MODERATE
COUGH CHARACTERISTICS: PRODUCTIVE
SPUTUM COLOR: YELLOW
PAIN LOCATION - PAIN QUALITY: ACHING
SPUTUM PRODUCTION: 1
COUGH: 1

## 2024-01-30 NOTE — HOSPICE
PRN nursing visit made due to patient's daughter in law requesting a visit related to patient's increasing cough and congestion.  Patient lying in bed upon arrival for visit; patient's daughter in law present.  Patient is lethargic but answers questions when asked.  Patient is dyspneic at rest on 4L O2 via NC.  Wheezing noted with crackles auscultated to lower lung lobes.  Patient's daughter in law reports that patient has been experiencing increased coughing, yellow sputum production, dyspnea, pain to rib area, headaches and \"ears feeling full.\"  Patient has been taking Duonebs QID and Delsym BID with little to no effect.  Liz Ray NP notified; new order obtained for Doxycycline Hyclate 100 mg PO BID for 7 days for respiratory infection.  Hospice triage to order for delivery today.

## 2024-01-31 ENCOUNTER — HOME CARE VISIT (OUTPATIENT)
Facility: HOME HEALTH | Age: 89
End: 2024-01-31

## 2024-01-31 ENCOUNTER — HOME CARE VISIT (OUTPATIENT)
Age: 89
End: 2024-01-31

## 2024-01-31 PROCEDURE — G0156 HHCP-SVS OF AIDE,EA 15 MIN: HCPCS

## 2024-02-02 ENCOUNTER — HOME CARE VISIT (OUTPATIENT)
Facility: HOME HEALTH | Age: 89
End: 2024-02-02

## 2024-02-02 VITALS
DIASTOLIC BLOOD PRESSURE: 68 MMHG | SYSTOLIC BLOOD PRESSURE: 125 MMHG | OXYGEN SATURATION: 100 % | HEART RATE: 88 BPM | TEMPERATURE: 97.6 F | RESPIRATION RATE: 20 BRPM

## 2024-02-02 PROCEDURE — G0299 HHS/HOSPICE OF RN EA 15 MIN: HCPCS

## 2024-02-02 ASSESSMENT — ENCOUNTER SYMPTOMS
SPUTUM PRODUCTION: 1
SPUTUM AMOUNT: MODERATE
COUGH: 1
SPUTUM CONSISTENCY: THICK
SPUTUM COLOR: TAN
COUGH CHARACTERISTICS: PRODUCTIVE

## 2024-02-03 NOTE — HOSPICE
Regular SN visit performed earlier than usual at patient's family's request for breathing exacerbation and increased fatigue.  Son Abdulaziz and daughter in law Radha present.  Patient is supine in bed with feet elevated.  She has a consistent cough, producing thick, off-white sputum, breathing at a normal rate with increased effort. Wheezes in mid to upper lungs areas, crackles below.  During visit, O2 sat 100% using 3lpm via nasal cannula.  Patient is weak, ambulating less distance and frequency, becoming fatigued and short of breath with short walks to bathroom.  Her appetite and intake are decreased, as are memory and cognition.  Lower extremity edema has returned.  She denies pain, but moans occasionally from discomfort.  Author encouraged patient and family to use lorazepam for rest and relaxation.  Radha stated she would administer.  Discussed patient condition with Abdulaziz and Radha, who are hopeful that this decline is due to upper respiratory infection, and will improve with current antibiotic therapy and would like patient to have another course of prednisone, which has been helpful with breathing issues and respiratory infections in the past.  Author agreed with sentiment, acknowledging that decline could be due to disease progression. Contacted provider Liz Ray NP and discussed patient/symptoms - no new orders at this time.  Abdulaziz and Radha express disappointment and understanding.  Author encouraged them to continue with breathing treatments, position patient more upright for breathing support, and administer symptom meds as needed:  lorazepam for discomfort and/or restlessness & hydromorphone for shortness of breath and/or pain.  They expressed understanding and will call with any changes.

## 2024-02-04 ENCOUNTER — HOME CARE VISIT (OUTPATIENT)
Age: 89
End: 2024-02-04

## 2024-02-05 NOTE — HOSPICE
Pt has had recent sharp decline. Discussed with Radha that pt may not recover and RNCM recommended giving pt hydromorphone ever 3 hours and lorazepam every 6 hours. Will return to evaluate pt in 2 days for 0-7. Family expressed desire for pt to be transferred to [facility] for EOL to spare the children in the house from potentially experiencing the dying process. When RNCM evaluates pt on Wednesday will determine if call to [facility] will be made then. Pt still eating but has new symtpoms of nausea and indegestion. RNCM advised to hold off on feeding pt unless she is asking for food and saying she is hungry so as not to overwhelm the body with the burden of digesting food. RNCM educated Radha on use of compazine for nausea and recommended giving pt medication prior to attempting to eat. Radha verbalized understanding. RNCM reminded caregiver to call hospice with any questions or concerns.

## 2024-02-06 NOTE — HOSPICE
Patient passed peaceful at home with family at bedside. TOD 0819 . Family grieving appropriately Post Mortem care provided. medication wasted per protocol, witness by ALANNA Romero  home contacted at family request

## 2024-02-08 ENCOUNTER — HOME CARE VISIT (OUTPATIENT)
Age: 89
End: 2024-02-08
Payer: SELF-PAY